# Patient Record
Sex: MALE | Race: OTHER | HISPANIC OR LATINO | URBAN - METROPOLITAN AREA
[De-identification: names, ages, dates, MRNs, and addresses within clinical notes are randomized per-mention and may not be internally consistent; named-entity substitution may affect disease eponyms.]

---

## 2019-03-31 ENCOUNTER — EMERGENCY (EMERGENCY)
Facility: HOSPITAL | Age: 79
LOS: 0 days | Discharge: ANOTHER HOSPITAL | End: 2019-04-01
Attending: EMERGENCY MEDICINE
Payer: MEDICARE

## 2019-03-31 VITALS
TEMPERATURE: 98 F | HEIGHT: 72 IN | DIASTOLIC BLOOD PRESSURE: 130 MMHG | SYSTOLIC BLOOD PRESSURE: 216 MMHG | OXYGEN SATURATION: 100 % | HEART RATE: 72 BPM | RESPIRATION RATE: 18 BRPM | WEIGHT: 214.95 LBS

## 2019-03-31 DIAGNOSIS — F32.9 MAJOR DEPRESSIVE DISORDER, SINGLE EPISODE, UNSPECIFIED: ICD-10-CM

## 2019-03-31 DIAGNOSIS — I10 ESSENTIAL (PRIMARY) HYPERTENSION: ICD-10-CM

## 2019-03-31 DIAGNOSIS — Z79.899 OTHER LONG TERM (CURRENT) DRUG THERAPY: ICD-10-CM

## 2019-03-31 DIAGNOSIS — R07.9 CHEST PAIN, UNSPECIFIED: ICD-10-CM

## 2019-03-31 DIAGNOSIS — I21.4 NON-ST ELEVATION (NSTEMI) MYOCARDIAL INFARCTION: ICD-10-CM

## 2019-03-31 DIAGNOSIS — Z79.82 LONG TERM (CURRENT) USE OF ASPIRIN: ICD-10-CM

## 2019-03-31 DIAGNOSIS — Z98.890 OTHER SPECIFIED POSTPROCEDURAL STATES: Chronic | ICD-10-CM

## 2019-03-31 DIAGNOSIS — E03.9 HYPOTHYROIDISM, UNSPECIFIED: ICD-10-CM

## 2019-03-31 DIAGNOSIS — I16.1 HYPERTENSIVE EMERGENCY: ICD-10-CM

## 2019-03-31 DIAGNOSIS — E11.9 TYPE 2 DIABETES MELLITUS WITHOUT COMPLICATIONS: ICD-10-CM

## 2019-03-31 DIAGNOSIS — I24.9 ACUTE ISCHEMIC HEART DISEASE, UNSPECIFIED: ICD-10-CM

## 2019-03-31 DIAGNOSIS — I25.10 ATHEROSCLEROTIC HEART DISEASE OF NATIVE CORONARY ARTERY WITHOUT ANGINA PECTORIS: ICD-10-CM

## 2019-03-31 LAB
ALBUMIN SERPL ELPH-MCNC: 2.7 G/DL — LOW (ref 3.3–5)
ALP SERPL-CCNC: 118 U/L — SIGNIFICANT CHANGE UP (ref 40–120)
ALT FLD-CCNC: 18 U/L — SIGNIFICANT CHANGE UP (ref 12–78)
ANION GAP SERPL CALC-SCNC: 7 MMOL/L — SIGNIFICANT CHANGE UP (ref 5–17)
APTT BLD: 32.6 SEC — SIGNIFICANT CHANGE UP (ref 27.5–36.3)
AST SERPL-CCNC: 26 U/L — SIGNIFICANT CHANGE UP (ref 15–37)
BILIRUB SERPL-MCNC: 0.3 MG/DL — SIGNIFICANT CHANGE UP (ref 0.2–1.2)
BUN SERPL-MCNC: 36 MG/DL — HIGH (ref 7–23)
CALCIUM SERPL-MCNC: 8.7 MG/DL — SIGNIFICANT CHANGE UP (ref 8.5–10.1)
CHLORIDE SERPL-SCNC: 107 MMOL/L — SIGNIFICANT CHANGE UP (ref 96–108)
CO2 SERPL-SCNC: 26 MMOL/L — SIGNIFICANT CHANGE UP (ref 22–31)
CREAT SERPL-MCNC: 3.13 MG/DL — HIGH (ref 0.5–1.3)
GLUCOSE SERPL-MCNC: 202 MG/DL — HIGH (ref 70–99)
HCT VFR BLD CALC: 39.5 % — SIGNIFICANT CHANGE UP (ref 39–50)
HGB BLD-MCNC: 12.9 G/DL — LOW (ref 13–17)
INR BLD: 0.94 RATIO — SIGNIFICANT CHANGE UP (ref 0.88–1.16)
MCHC RBC-ENTMCNC: 32.1 PG — SIGNIFICANT CHANGE UP (ref 27–34)
MCHC RBC-ENTMCNC: 32.7 GM/DL — SIGNIFICANT CHANGE UP (ref 32–36)
MCV RBC AUTO: 98.3 FL — SIGNIFICANT CHANGE UP (ref 80–100)
NRBC # BLD: 0 /100 WBCS — SIGNIFICANT CHANGE UP (ref 0–0)
NT-PROBNP SERPL-SCNC: 4405 PG/ML — HIGH (ref 0–450)
PLATELET # BLD AUTO: 174 K/UL — SIGNIFICANT CHANGE UP (ref 150–400)
POTASSIUM SERPL-MCNC: 4.5 MMOL/L — SIGNIFICANT CHANGE UP (ref 3.5–5.3)
POTASSIUM SERPL-SCNC: 4.5 MMOL/L — SIGNIFICANT CHANGE UP (ref 3.5–5.3)
PROT SERPL-MCNC: 6.6 GM/DL — SIGNIFICANT CHANGE UP (ref 6–8.3)
PROTHROM AB SERPL-ACNC: 10.5 SEC — SIGNIFICANT CHANGE UP (ref 10–12.9)
RBC # BLD: 4.02 M/UL — LOW (ref 4.2–5.8)
RBC # FLD: 13.5 % — SIGNIFICANT CHANGE UP (ref 10.3–14.5)
SODIUM SERPL-SCNC: 140 MMOL/L — SIGNIFICANT CHANGE UP (ref 135–145)
TROPONIN I SERPL-MCNC: 0.34 NG/ML — HIGH (ref 0.01–0.04)
WBC # BLD: 8.96 K/UL — SIGNIFICANT CHANGE UP (ref 3.8–10.5)
WBC # FLD AUTO: 8.96 K/UL — SIGNIFICANT CHANGE UP (ref 3.8–10.5)

## 2019-03-31 PROCEDURE — 71045 X-RAY EXAM CHEST 1 VIEW: CPT | Mod: 26

## 2019-03-31 PROCEDURE — 99291 CRITICAL CARE FIRST HOUR: CPT

## 2019-03-31 PROCEDURE — 93010 ELECTROCARDIOGRAM REPORT: CPT | Mod: 76

## 2019-03-31 RX ORDER — HEPARIN SODIUM 5000 [USP'U]/ML
INJECTION INTRAVENOUS; SUBCUTANEOUS
Qty: 25000 | Refills: 0 | Status: DISCONTINUED | OUTPATIENT
Start: 2019-03-31 | End: 2019-04-01

## 2019-03-31 RX ORDER — HEPARIN SODIUM 5000 [USP'U]/ML
6000 INJECTION INTRAVENOUS; SUBCUTANEOUS EVERY 6 HOURS
Qty: 0 | Refills: 0 | Status: DISCONTINUED | OUTPATIENT
Start: 2019-03-31 | End: 2019-04-01

## 2019-03-31 RX ORDER — FUROSEMIDE 40 MG
20 TABLET ORAL ONCE
Qty: 0 | Refills: 0 | Status: COMPLETED | OUTPATIENT
Start: 2019-03-31 | End: 2019-03-31

## 2019-03-31 RX ORDER — HEPARIN SODIUM 5000 [USP'U]/ML
5000 INJECTION INTRAVENOUS; SUBCUTANEOUS ONCE
Qty: 0 | Refills: 0 | Status: COMPLETED | OUTPATIENT
Start: 2019-03-31 | End: 2019-04-01

## 2019-03-31 RX ORDER — NITROGLYCERIN 6.5 MG
0.4 CAPSULE, EXTENDED RELEASE ORAL ONCE
Qty: 0 | Refills: 0 | Status: COMPLETED | OUTPATIENT
Start: 2019-03-31 | End: 2019-03-31

## 2019-03-31 RX ADMIN — Medication 0.4 MILLIGRAM(S): at 22:49

## 2019-03-31 NOTE — ED ADULT NURSE NOTE - OBJECTIVE STATEMENT
Patient reports left pectoral chest pain since 8 o'clock: Pain goes to back and left arm, started while watching TV, Same pain he had prior to bypass surgery. Patient reports left pectoral chest pain since 8 o'clock: Pain goes to back and left arm, started while watching TV, Same pain he had prior to bypass surgery. Patient reports pain decreases on rest. Took Aspirin prior to arrival.

## 2019-03-31 NOTE — ED PROVIDER NOTE - OBJECTIVE STATEMENT
78yoM; with pmh signif for CAD (s/p 2V-CABG), HTN, HLD; now p/w chest pain--left sided chest pain, radiating to left arm, associated with nausea, 10/10-->8/10, at rest, worse with movement.  denies sob/palp. denies lightheadedness/diaphoresis. denies f/c/s. c/o LE swelling.  PMH: CAD, HTN, HLD  SOCIAL: No tobacco/illicit substance use/socialEtOH 78yoM; with pmh signif for CAD (s/p 2V-CABG), HTN, HLD; now p/w chest pain--left sided chest pain, radiating to left arm, associated with nausea, 10/10-->8/10, at rest, worse with movement.  denies sob/palp. denies lightheadedness/diaphoresis. denies f/c/s. c/o LE swelling.  last cath >5 years ago.  CABG >10 years ago.  last stress >5 years ago  PMH: CAD, HTN, HLD  SOCIAL: No tobacco/illicit substance use/socialEtOH

## 2019-03-31 NOTE — ED PROVIDER NOTE - CARE PLAN
Principal Discharge DX:	ACS (acute coronary syndrome)  Secondary Diagnosis:	Hypertensive emergency  Secondary Diagnosis:	NSTEMI, initial episode of care

## 2019-03-31 NOTE — ED PROVIDER NOTE - NSRISKOFTRANSFER_ED_A_ED
Death or Disability/Transportation Risk (There is always a risk of traffic delays resulting in deterioration of condition.)/Deterioration of Condition En Route/Increased Pain

## 2019-03-31 NOTE — ED PROVIDER NOTE - CLINICAL SUMMARY MEDICAL DECISION MAKING FREE TEXT BOX
patient arrived c/o anginal equivalent chest pain, found to be hypertensive, st depressions in lateral leads, patient took [id number] at home at time of chest pain, given brilinta, heparin.  given IVP Hydralazine and given home meds. Discussed case with [facility] CCU fellow who accepted patient as transfer ER to ER.  patient and daughter notified.

## 2019-03-31 NOTE — ED ADULT NURSE NOTE - PMH
ASHD (arteriosclerotic heart disease)    Depression    Diabetes    HTN (hypertension)    Hypothyroid

## 2019-03-31 NOTE — ED PROVIDER NOTE - CRITICAL CARE PROVIDED
additional history taking/direct patient care (not related to procedure)/documentation/consultation with other physicians/consult w/ pt's family directly relating to pts condition/interpretation of diagnostic studies

## 2019-04-01 ENCOUNTER — INPATIENT (INPATIENT)
Facility: HOSPITAL | Age: 79
LOS: 6 days | Discharge: HOME | DRG: 246 | End: 2019-04-08
Attending: HOSPITALIST | Admitting: STUDENT IN AN ORGANIZED HEALTH CARE EDUCATION/TRAINING PROGRAM
Payer: MEDICARE

## 2019-04-01 VITALS
RESPIRATION RATE: 18 BRPM | HEART RATE: 78 BPM | WEIGHT: 214.07 LBS | OXYGEN SATURATION: 98 % | DIASTOLIC BLOOD PRESSURE: 165 MMHG | SYSTOLIC BLOOD PRESSURE: 214 MMHG | HEIGHT: 67 IN

## 2019-04-01 VITALS — SYSTOLIC BLOOD PRESSURE: 181 MMHG | DIASTOLIC BLOOD PRESSURE: 105 MMHG

## 2019-04-01 DIAGNOSIS — I21.4 NON-ST ELEVATION (NSTEMI) MYOCARDIAL INFARCTION: ICD-10-CM

## 2019-04-01 DIAGNOSIS — Z98.890 OTHER SPECIFIED POSTPROCEDURAL STATES: Chronic | ICD-10-CM

## 2019-04-01 DIAGNOSIS — Z95.1 PRESENCE OF AORTOCORONARY BYPASS GRAFT: Chronic | ICD-10-CM

## 2019-04-01 DIAGNOSIS — E11.9 TYPE 2 DIABETES MELLITUS WITHOUT COMPLICATIONS: ICD-10-CM

## 2019-04-01 DIAGNOSIS — I16.1 HYPERTENSIVE EMERGENCY: ICD-10-CM

## 2019-04-01 DIAGNOSIS — N17.9 ACUTE KIDNEY FAILURE, UNSPECIFIED: ICD-10-CM

## 2019-04-01 DIAGNOSIS — I10 ESSENTIAL (PRIMARY) HYPERTENSION: ICD-10-CM

## 2019-04-01 DIAGNOSIS — E03.9 HYPOTHYROIDISM, UNSPECIFIED: ICD-10-CM

## 2019-04-01 LAB
ALBUMIN SERPL ELPH-MCNC: 3.3 G/DL — SIGNIFICANT CHANGE UP (ref 3.3–5)
ALBUMIN SERPL ELPH-MCNC: 3.5 G/DL — SIGNIFICANT CHANGE UP (ref 3.3–5)
ALBUMIN SERPL ELPH-MCNC: 3.8 G/DL — SIGNIFICANT CHANGE UP (ref 3.3–5)
ALP SERPL-CCNC: 107 U/L — SIGNIFICANT CHANGE UP (ref 40–120)
ALP SERPL-CCNC: 110 U/L — SIGNIFICANT CHANGE UP (ref 40–120)
ALP SERPL-CCNC: 120 U/L — SIGNIFICANT CHANGE UP (ref 40–120)
ALT FLD-CCNC: 15 U/L — SIGNIFICANT CHANGE UP (ref 10–45)
ALT FLD-CCNC: 19 U/L — SIGNIFICANT CHANGE UP (ref 10–45)
ALT FLD-CCNC: 55 U/L — HIGH (ref 10–45)
ANION GAP SERPL CALC-SCNC: 12 MMOL/L — SIGNIFICANT CHANGE UP (ref 5–17)
ANION GAP SERPL CALC-SCNC: 13 MMOL/L — SIGNIFICANT CHANGE UP (ref 5–17)
ANION GAP SERPL CALC-SCNC: 15 MMOL/L — SIGNIFICANT CHANGE UP (ref 5–17)
ANION GAP SERPL CALC-SCNC: 18 MMOL/L — HIGH (ref 5–17)
APTT BLD: 51.2 SEC — HIGH (ref 27.5–36.3)
APTT BLD: 98.2 SEC — HIGH (ref 27.5–36.3)
AST SERPL-CCNC: 44 U/L — HIGH (ref 10–40)
AST SERPL-CCNC: 47 U/L — HIGH (ref 10–40)
AST SERPL-CCNC: 96 U/L — HIGH (ref 10–40)
BILIRUB SERPL-MCNC: 0.3 MG/DL — SIGNIFICANT CHANGE UP (ref 0.2–1.2)
BILIRUB SERPL-MCNC: 0.4 MG/DL — SIGNIFICANT CHANGE UP (ref 0.2–1.2)
BILIRUB SERPL-MCNC: 0.4 MG/DL — SIGNIFICANT CHANGE UP (ref 0.2–1.2)
BLD GP AB SCN SERPL QL: NEGATIVE — SIGNIFICANT CHANGE UP
BUN SERPL-MCNC: 31 MG/DL — HIGH (ref 7–23)
BUN SERPL-MCNC: 31 MG/DL — HIGH (ref 7–23)
BUN SERPL-MCNC: 32 MG/DL — HIGH (ref 7–23)
BUN SERPL-MCNC: 32 MG/DL — HIGH (ref 7–23)
CALCIUM SERPL-MCNC: 9.3 MG/DL — SIGNIFICANT CHANGE UP (ref 8.4–10.5)
CALCIUM SERPL-MCNC: 9.5 MG/DL — SIGNIFICANT CHANGE UP (ref 8.4–10.5)
CALCIUM SERPL-MCNC: 9.5 MG/DL — SIGNIFICANT CHANGE UP (ref 8.4–10.5)
CALCIUM SERPL-MCNC: 9.8 MG/DL — SIGNIFICANT CHANGE UP (ref 8.4–10.5)
CHLORIDE SERPL-SCNC: 103 MMOL/L — SIGNIFICANT CHANGE UP (ref 96–108)
CHLORIDE SERPL-SCNC: 105 MMOL/L — SIGNIFICANT CHANGE UP (ref 96–108)
CHLORIDE SERPL-SCNC: 105 MMOL/L — SIGNIFICANT CHANGE UP (ref 96–108)
CHLORIDE SERPL-SCNC: 106 MMOL/L — SIGNIFICANT CHANGE UP (ref 96–108)
CHOLEST SERPL-MCNC: 217 MG/DL — HIGH (ref 10–199)
CK MB BLD-MCNC: 6 % — HIGH (ref 0–3.5)
CK MB CFR SERPL CALC: 19.3 NG/ML — HIGH (ref 0–6.7)
CK MB CFR SERPL CALC: 22.8 NG/ML — HIGH (ref 0–6.7)
CK SERPL-CCNC: 313 U/L — HIGH (ref 30–200)
CK SERPL-CCNC: 383 U/L — HIGH (ref 30–200)
CO2 SERPL-SCNC: 19 MMOL/L — LOW (ref 22–31)
CO2 SERPL-SCNC: 21 MMOL/L — LOW (ref 22–31)
CO2 SERPL-SCNC: 22 MMOL/L — SIGNIFICANT CHANGE UP (ref 22–31)
CO2 SERPL-SCNC: 24 MMOL/L — SIGNIFICANT CHANGE UP (ref 22–31)
CREAT SERPL-MCNC: 2.64 MG/DL — HIGH (ref 0.5–1.3)
CREAT SERPL-MCNC: 2.7 MG/DL — HIGH (ref 0.5–1.3)
CREAT SERPL-MCNC: 2.86 MG/DL — HIGH (ref 0.5–1.3)
CREAT SERPL-MCNC: 2.9 MG/DL — HIGH (ref 0.5–1.3)
D DIMER BLD IA.RAPID-MCNC: 609 NG/ML DDU — HIGH
FIBRINOGEN PPP-MCNC: 627 MG/DL — HIGH (ref 350–510)
GLUCOSE BLDC GLUCOMTR-MCNC: 107 MG/DL — HIGH (ref 70–99)
GLUCOSE SERPL-MCNC: 102 MG/DL — HIGH (ref 70–99)
GLUCOSE SERPL-MCNC: 90 MG/DL — SIGNIFICANT CHANGE UP (ref 70–99)
GLUCOSE SERPL-MCNC: 96 MG/DL — SIGNIFICANT CHANGE UP (ref 70–99)
GLUCOSE SERPL-MCNC: 99 MG/DL — SIGNIFICANT CHANGE UP (ref 70–99)
HBA1C BLD-MCNC: 6.3 % — HIGH (ref 4–5.6)
HCT VFR BLD CALC: 42.4 % — SIGNIFICANT CHANGE UP (ref 39–50)
HCT VFR BLD CALC: 43.7 % — SIGNIFICANT CHANGE UP (ref 39–50)
HDLC SERPL-MCNC: 46 MG/DL — SIGNIFICANT CHANGE UP
HGB BLD-MCNC: 14 G/DL — SIGNIFICANT CHANGE UP (ref 13–17)
HGB BLD-MCNC: 14.7 G/DL — SIGNIFICANT CHANGE UP (ref 13–17)
INR BLD: 0.95 RATIO — SIGNIFICANT CHANGE UP (ref 0.88–1.16)
LIDOCAIN IGE QN: 40 U/L — SIGNIFICANT CHANGE UP (ref 7–60)
LIPID PNL WITH DIRECT LDL SERPL: 145 MG/DL — HIGH
MAGNESIUM SERPL-MCNC: 2.4 MG/DL — SIGNIFICANT CHANGE UP (ref 1.6–2.6)
MAGNESIUM SERPL-MCNC: 2.5 MG/DL — SIGNIFICANT CHANGE UP (ref 1.6–2.6)
MCHC RBC-ENTMCNC: 32.5 PG — SIGNIFICANT CHANGE UP (ref 27–34)
MCHC RBC-ENTMCNC: 32.9 PG — SIGNIFICANT CHANGE UP (ref 27–34)
MCHC RBC-ENTMCNC: 33 GM/DL — SIGNIFICANT CHANGE UP (ref 32–36)
MCHC RBC-ENTMCNC: 33.6 GM/DL — SIGNIFICANT CHANGE UP (ref 32–36)
MCV RBC AUTO: 98 FL — SIGNIFICANT CHANGE UP (ref 80–100)
MCV RBC AUTO: 98.4 FL — SIGNIFICANT CHANGE UP (ref 80–100)
NT-PROBNP SERPL-SCNC: 4165 PG/ML — HIGH (ref 0–300)
PHOSPHATE SERPL-MCNC: 2.6 MG/DL — SIGNIFICANT CHANGE UP (ref 2.5–4.5)
PLATELET # BLD AUTO: 181 K/UL — SIGNIFICANT CHANGE UP (ref 150–400)
PLATELET # BLD AUTO: 184 K/UL — SIGNIFICANT CHANGE UP (ref 150–400)
POTASSIUM SERPL-MCNC: 3.8 MMOL/L — SIGNIFICANT CHANGE UP (ref 3.5–5.3)
POTASSIUM SERPL-MCNC: 4.1 MMOL/L — SIGNIFICANT CHANGE UP (ref 3.5–5.3)
POTASSIUM SERPL-MCNC: 6.1 MMOL/L — HIGH (ref 3.5–5.3)
POTASSIUM SERPL-MCNC: 7.3 MMOL/L — CRITICAL HIGH (ref 3.5–5.3)
POTASSIUM SERPL-SCNC: 3.8 MMOL/L — SIGNIFICANT CHANGE UP (ref 3.5–5.3)
POTASSIUM SERPL-SCNC: 4.1 MMOL/L — SIGNIFICANT CHANGE UP (ref 3.5–5.3)
POTASSIUM SERPL-SCNC: 6.1 MMOL/L — HIGH (ref 3.5–5.3)
POTASSIUM SERPL-SCNC: 7.3 MMOL/L — CRITICAL HIGH (ref 3.5–5.3)
PROT SERPL-MCNC: 6.7 G/DL — SIGNIFICANT CHANGE UP (ref 6–8.3)
PROT SERPL-MCNC: 7.1 G/DL — SIGNIFICANT CHANGE UP (ref 6–8.3)
PROT SERPL-MCNC: 7.6 G/DL — SIGNIFICANT CHANGE UP (ref 6–8.3)
PROTHROM AB SERPL-ACNC: 10.8 SEC — SIGNIFICANT CHANGE UP (ref 10–12.9)
RBC # BLD: 4.31 M/UL — SIGNIFICANT CHANGE UP (ref 4.2–5.8)
RBC # BLD: 4.46 M/UL — SIGNIFICANT CHANGE UP (ref 4.2–5.8)
RBC # FLD: 12.2 % — SIGNIFICANT CHANGE UP (ref 10.3–14.5)
RBC # FLD: 12.3 % — SIGNIFICANT CHANGE UP (ref 10.3–14.5)
RH IG SCN BLD-IMP: POSITIVE — SIGNIFICANT CHANGE UP
SODIUM SERPL-SCNC: 137 MMOL/L — SIGNIFICANT CHANGE UP (ref 135–145)
SODIUM SERPL-SCNC: 141 MMOL/L — SIGNIFICANT CHANGE UP (ref 135–145)
SODIUM SERPL-SCNC: 142 MMOL/L — SIGNIFICANT CHANGE UP (ref 135–145)
SODIUM SERPL-SCNC: 143 MMOL/L — SIGNIFICANT CHANGE UP (ref 135–145)
TOTAL CHOLESTEROL/HDL RATIO MEASUREMENT: 4.7 RATIO — SIGNIFICANT CHANGE UP (ref 3.4–9.6)
TRIGL SERPL-MCNC: 129 MG/DL — SIGNIFICANT CHANGE UP (ref 10–149)
TROPONIN T, HIGH SENSITIVITY RESULT: 416 NG/L — HIGH (ref 0–51)
TROPONIN T, HIGH SENSITIVITY RESULT: 819 NG/L — HIGH (ref 0–51)
WBC # BLD: 10 K/UL — SIGNIFICANT CHANGE UP (ref 3.8–10.5)
WBC # BLD: 9 K/UL — SIGNIFICANT CHANGE UP (ref 3.8–10.5)
WBC # FLD AUTO: 10 K/UL — SIGNIFICANT CHANGE UP (ref 3.8–10.5)
WBC # FLD AUTO: 9 K/UL — SIGNIFICANT CHANGE UP (ref 3.8–10.5)

## 2019-04-01 PROCEDURE — 92941 PRQ TRLML REVSC TOT OCCL AMI: CPT | Mod: LD,GC

## 2019-04-01 PROCEDURE — 93458 L HRT ARTERY/VENTRICLE ANGIO: CPT | Mod: 26,59,GC

## 2019-04-01 PROCEDURE — 99223 1ST HOSP IP/OBS HIGH 75: CPT | Mod: GC

## 2019-04-01 PROCEDURE — 92978 ENDOLUMINL IVUS OCT C 1ST: CPT | Mod: 26,LD,GC

## 2019-04-01 PROCEDURE — 99291 CRITICAL CARE FIRST HOUR: CPT | Mod: GC

## 2019-04-01 PROCEDURE — 71046 X-RAY EXAM CHEST 2 VIEWS: CPT | Mod: 26

## 2019-04-01 PROCEDURE — 93308 TTE F-UP OR LMTD: CPT | Mod: 26

## 2019-04-01 PROCEDURE — 93010 ELECTROCARDIOGRAM REPORT: CPT

## 2019-04-01 PROCEDURE — 99152 MOD SED SAME PHYS/QHP 5/>YRS: CPT | Mod: GC

## 2019-04-01 RX ORDER — NITROGLYCERIN 6.5 MG
25 CAPSULE, EXTENDED RELEASE ORAL
Qty: 50 | Refills: 0 | Status: DISCONTINUED | OUTPATIENT
Start: 2019-04-01 | End: 2019-04-01

## 2019-04-01 RX ORDER — SODIUM CHLORIDE 9 MG/ML
1000 INJECTION, SOLUTION INTRAVENOUS
Qty: 0 | Refills: 0 | Status: DISCONTINUED | OUTPATIENT
Start: 2019-04-01 | End: 2019-04-08

## 2019-04-01 RX ORDER — HYDRALAZINE HCL 50 MG
25 TABLET ORAL ONCE
Qty: 0 | Refills: 0 | Status: DISCONTINUED | OUTPATIENT
Start: 2019-04-01 | End: 2019-04-01

## 2019-04-01 RX ORDER — HYDRALAZINE HCL 50 MG
10 TABLET ORAL ONCE
Qty: 0 | Refills: 0 | Status: COMPLETED | OUTPATIENT
Start: 2019-04-01 | End: 2019-04-01

## 2019-04-01 RX ORDER — METOPROLOL TARTRATE 50 MG
25 TABLET ORAL
Qty: 0 | Refills: 0 | Status: DISCONTINUED | OUTPATIENT
Start: 2019-04-01 | End: 2019-04-04

## 2019-04-01 RX ORDER — ISOSORBIDE MONONITRATE 60 MG/1
60 TABLET, EXTENDED RELEASE ORAL DAILY
Qty: 0 | Refills: 0 | Status: DISCONTINUED | OUTPATIENT
Start: 2019-04-01 | End: 2019-04-01

## 2019-04-01 RX ORDER — INSULIN LISPRO 100/ML
VIAL (ML) SUBCUTANEOUS
Qty: 0 | Refills: 0 | Status: DISCONTINUED | OUTPATIENT
Start: 2019-04-01 | End: 2019-04-08

## 2019-04-01 RX ORDER — INSULIN LISPRO 100/ML
VIAL (ML) SUBCUTANEOUS AT BEDTIME
Qty: 0 | Refills: 0 | Status: DISCONTINUED | OUTPATIENT
Start: 2019-04-01 | End: 2019-04-08

## 2019-04-01 RX ORDER — METOPROLOL TARTRATE 50 MG
50 TABLET ORAL ONCE
Qty: 0 | Refills: 0 | Status: COMPLETED | OUTPATIENT
Start: 2019-04-01 | End: 2019-04-01

## 2019-04-01 RX ORDER — HEPARIN SODIUM 5000 [USP'U]/ML
700 INJECTION INTRAVENOUS; SUBCUTANEOUS
Qty: 25000 | Refills: 0 | Status: DISCONTINUED | OUTPATIENT
Start: 2019-04-01 | End: 2019-04-01

## 2019-04-01 RX ORDER — DEXTROSE 50 % IN WATER 50 %
25 SYRINGE (ML) INTRAVENOUS ONCE
Qty: 0 | Refills: 0 | Status: DISCONTINUED | OUTPATIENT
Start: 2019-04-01 | End: 2019-04-08

## 2019-04-01 RX ORDER — LEVOTHYROXINE SODIUM 125 MCG
175 TABLET ORAL DAILY
Qty: 0 | Refills: 0 | Status: DISCONTINUED | OUTPATIENT
Start: 2019-04-01 | End: 2019-04-04

## 2019-04-01 RX ORDER — LISINOPRIL 2.5 MG/1
20 TABLET ORAL ONCE
Qty: 0 | Refills: 0 | Status: COMPLETED | OUTPATIENT
Start: 2019-04-01 | End: 2019-04-01

## 2019-04-01 RX ORDER — TICAGRELOR 90 MG/1
180 TABLET ORAL ONCE
Qty: 0 | Refills: 0 | Status: COMPLETED | OUTPATIENT
Start: 2019-04-01 | End: 2019-04-01

## 2019-04-01 RX ORDER — HYDRALAZINE HCL 50 MG
50 TABLET ORAL EVERY 8 HOURS
Qty: 0 | Refills: 0 | Status: DISCONTINUED | OUTPATIENT
Start: 2019-04-01 | End: 2019-04-02

## 2019-04-01 RX ORDER — ISOSORBIDE MONONITRATE 60 MG/1
60 TABLET, EXTENDED RELEASE ORAL DAILY
Qty: 0 | Refills: 0 | Status: DISCONTINUED | OUTPATIENT
Start: 2019-04-01 | End: 2019-04-03

## 2019-04-01 RX ORDER — NITROGLYCERIN 6.5 MG
1 CAPSULE, EXTENDED RELEASE ORAL ONCE
Qty: 0 | Refills: 0 | Status: COMPLETED | OUTPATIENT
Start: 2019-04-01 | End: 2019-04-01

## 2019-04-01 RX ORDER — ASPIRIN/CALCIUM CARB/MAGNESIUM 324 MG
81 TABLET ORAL DAILY
Qty: 0 | Refills: 0 | Status: DISCONTINUED | OUTPATIENT
Start: 2019-04-02 | End: 2019-04-08

## 2019-04-01 RX ORDER — PANTOPRAZOLE SODIUM 20 MG/1
40 TABLET, DELAYED RELEASE ORAL
Qty: 0 | Refills: 0 | Status: DISCONTINUED | OUTPATIENT
Start: 2019-04-01 | End: 2019-04-08

## 2019-04-01 RX ORDER — DEXTROSE 50 % IN WATER 50 %
15 SYRINGE (ML) INTRAVENOUS ONCE
Qty: 0 | Refills: 0 | Status: DISCONTINUED | OUTPATIENT
Start: 2019-04-01 | End: 2019-04-08

## 2019-04-01 RX ORDER — HYDRALAZINE HCL 50 MG
25 TABLET ORAL ONCE
Qty: 0 | Refills: 0 | Status: COMPLETED | OUTPATIENT
Start: 2019-04-01 | End: 2019-04-01

## 2019-04-01 RX ORDER — GLUCAGON INJECTION, SOLUTION 0.5 MG/.1ML
1 INJECTION, SOLUTION SUBCUTANEOUS ONCE
Qty: 0 | Refills: 0 | Status: DISCONTINUED | OUTPATIENT
Start: 2019-04-01 | End: 2019-04-08

## 2019-04-01 RX ORDER — ATORVASTATIN CALCIUM 80 MG/1
40 TABLET, FILM COATED ORAL ONCE
Qty: 0 | Refills: 0 | Status: COMPLETED | OUTPATIENT
Start: 2019-04-01 | End: 2019-04-01

## 2019-04-01 RX ORDER — DEXTROSE 50 % IN WATER 50 %
12.5 SYRINGE (ML) INTRAVENOUS ONCE
Qty: 0 | Refills: 0 | Status: DISCONTINUED | OUTPATIENT
Start: 2019-04-01 | End: 2019-04-08

## 2019-04-01 RX ORDER — HYDRALAZINE HCL 50 MG
50 TABLET ORAL ONCE
Qty: 0 | Refills: 0 | Status: COMPLETED | OUTPATIENT
Start: 2019-04-01 | End: 2019-04-01

## 2019-04-01 RX ORDER — HEPARIN SODIUM 5000 [USP'U]/ML
INJECTION INTRAVENOUS; SUBCUTANEOUS
Qty: 25000 | Refills: 0 | Status: DISCONTINUED | OUTPATIENT
Start: 2019-04-01 | End: 2019-04-01

## 2019-04-01 RX ORDER — TICAGRELOR 90 MG/1
90 TABLET ORAL
Qty: 0 | Refills: 0 | Status: DISCONTINUED | OUTPATIENT
Start: 2019-04-02 | End: 2019-04-08

## 2019-04-01 RX ORDER — ATORVASTATIN CALCIUM 80 MG/1
80 TABLET, FILM COATED ORAL AT BEDTIME
Qty: 0 | Refills: 0 | Status: DISCONTINUED | OUTPATIENT
Start: 2019-04-01 | End: 2019-04-08

## 2019-04-01 RX ADMIN — Medication 50 MILLIGRAM(S): at 15:17

## 2019-04-01 RX ADMIN — Medication 20 MILLIGRAM(S): at 00:07

## 2019-04-01 RX ADMIN — HEPARIN SODIUM 1000 UNIT(S)/HR: 5000 INJECTION INTRAVENOUS; SUBCUTANEOUS at 00:09

## 2019-04-01 RX ADMIN — TICAGRELOR 180 MILLIGRAM(S): 90 TABLET ORAL at 02:51

## 2019-04-01 RX ADMIN — HEPARIN SODIUM 900 UNIT(S)/HR: 5000 INJECTION INTRAVENOUS; SUBCUTANEOUS at 16:25

## 2019-04-01 RX ADMIN — HEPARIN SODIUM 700 UNIT(S)/HR: 5000 INJECTION INTRAVENOUS; SUBCUTANEOUS at 09:15

## 2019-04-01 RX ADMIN — Medication 10 MILLIGRAM(S): at 00:53

## 2019-04-01 RX ADMIN — Medication 50 MILLIGRAM(S): at 03:17

## 2019-04-01 RX ADMIN — HEPARIN SODIUM 1000 UNIT(S)/HR: 5000 INJECTION INTRAVENOUS; SUBCUTANEOUS at 04:14

## 2019-04-01 RX ADMIN — Medication 50 MILLIGRAM(S): at 22:58

## 2019-04-01 RX ADMIN — ISOSORBIDE MONONITRATE 60 MILLIGRAM(S): 60 TABLET, EXTENDED RELEASE ORAL at 07:30

## 2019-04-01 RX ADMIN — ATORVASTATIN CALCIUM 40 MILLIGRAM(S): 80 TABLET, FILM COATED ORAL at 02:49

## 2019-04-01 RX ADMIN — ATORVASTATIN CALCIUM 80 MILLIGRAM(S): 80 TABLET, FILM COATED ORAL at 22:58

## 2019-04-01 RX ADMIN — Medication 25 MILLIGRAM(S): at 07:31

## 2019-04-01 RX ADMIN — Medication 1 INCH(S): at 03:17

## 2019-04-01 RX ADMIN — LISINOPRIL 20 MILLIGRAM(S): 2.5 TABLET ORAL at 02:49

## 2019-04-01 RX ADMIN — HEPARIN SODIUM 5000 UNIT(S): 5000 INJECTION INTRAVENOUS; SUBCUTANEOUS at 00:08

## 2019-04-01 RX ADMIN — Medication 7.5 MICROGRAM(S)/MIN: at 09:16

## 2019-04-01 NOTE — ED ADULT NURSE REASSESSMENT NOTE - NS ED NURSE REASSESS COMMENT FT1
MD hernandez made aware of BP, pt treated with multiple medications prior to transfer, MD does not want to treat BP at this time.

## 2019-04-01 NOTE — H&P ADULT - HISTORY OF PRESENT ILLNESS
This is a 78 year od male with history of HTN, DMT2, CAD with stents x2 (2000), hypothyroid, depression, transferred from [facility] this morning for NSTEMI and hypertensive urgency. the patient was brought in by daughter with complaints of substernal CP with radiation to left arm that started around 2045 (3/31), was found to have 's/120's, trop This is a 78 year od male with history of HTN, DMT2, CAD with stents x2 (2000), hypothyroid, depression, transferred from [facility] this morning for NSTEMI and hypertensive urgency. Pt noted gradually worsening substernal chest pain with radiation to RUE and took   mg x 2 pills prior to coming in to the hospital.  Pt presented to [place] where he was found to be hypertensive 200s/120s with ST depressions in V4-6 and trop I 0.344. Pt was given 10mg iv hydral, 20mg lisinopril, 50mg po lopressor, and 20mg iv lasix prior to transfer to [facility] (4/1). Transferred to CCU for persistent chest discomfort and 's now on nitro gtt and hep gtt but otherwise denied headache, vision changes, lightheadedness/dizziness, palpitations, and SOB, pending cardiac cath.

## 2019-04-01 NOTE — ED PROVIDER NOTE - ATTENDING CONTRIBUTION TO CARE
Attending MD Valarie Calix:  I personally have seen and examined this patient.  Resident note reviewed and agree on plan of care and except where noted.  See HPI, PE, and MDM for details.

## 2019-04-01 NOTE — H&P ADULT - NSHPREVIEWOFSYSTEMS_GEN_ALL_CORE
REVIEW OF SYSTEMS:    CONSTITUTIONAL: No weakness, fevers or chills  EYES/ENT: No visual changes;  No vertigo or throat pain   NECK: No pain or stiffness  RESPIRATORY: No cough, wheezing, hemoptysis; No shortness of breath  CARDIOVASCULAR: c/o intermittent substernal chest tightness, palpitations  GASTROINTESTINAL: No abdominal or epigastric pain. No nausea, vomiting, or hematemesis; No diarrhea or constipation. No melena or hematochezia.  GENITOURINARY: No dysuria, frequency or hematuria  NEUROLOGICAL: No numbness or weakness  SKIN: No itching, burning, rashes, or lesions   All other review of systems is negative unless indicated above.

## 2019-04-01 NOTE — H&P ADULT - NSHPPHYSICALEXAM_GEN_ALL_CORE
PHYSICAL EXAM:      Constitutional:    Eyes:    ENMT:    Neck:    Breasts:    Back:    Respiratory:    Cardiovascular:    Gastrointestinal:    Genitourinary:    Rectal:    Extremities:    Vascular:    Neurological:    Skin:    Lymph Nodes:    Musculoskeletal:    Psychiatric: PHYSICAL EXAM:      Constitutional: NAD    Respiratory: B/l lungs CTA     Cardiovascular: c/o intermittent substernal chest pain 4/10, denies any radiation     Gastrointestinal: nondistended, non tender, +BS    Genitourinary: no CVA tenderness     Extremities: no edema     Neurological: AOx3     Skin: intact, no rashes    Musculoskeletal: full ROM PHYSICAL EXAM:      Constitutional: WD /WN NAD    Respiratory: B/l lungs CTA     Cardiovascular: RRR, S1,S2     Gastrointestinal: nondistended, non tender, +BS    Genitourinary: no CVA tenderness     Extremities: no edema     Neurological: AOx3     Skin: intact, no rashes    Musculoskeletal: full ROM

## 2019-04-01 NOTE — ED PROVIDER NOTE - CARE PLAN
Principal Discharge DX:	Hypertensive emergency  Secondary Diagnosis:	CAD (coronary artery disease)  Secondary Diagnosis:	NSTEMI (non-ST elevated myocardial infarction)  Secondary Diagnosis:	ALDEN (acute kidney injury)  Secondary Diagnosis:	S/P CABG (coronary artery bypass graft)  Secondary Diagnosis:	HTN (hypertension)  Secondary Diagnosis:	HLD (hyperlipidemia)

## 2019-04-01 NOTE — CHART NOTE - NSCHARTNOTEFT_GEN_A_CORE
====================  CCU MIDNIGHT ROUNDS  ====================    YE OSWALDGO  [id number]    ====================  SUMMARY: HPI:  This is a 78 year od male with history of HTN, DMT2, CAD with stents x2 (2000), hypothyroid, depression, transferred from [facility] this morning for NSTEMI and hypertensive urgency. Pt noted gradually worsening substernal chest pain with radiation to RUE and took   mg x 2 pills prior to coming in to the hospital.  Pt presented to [place] where he was found to be hypertensive 200s/120s with ST depressions in V4-6 and trop I 0.344. Pt was given 10mg iv hydral, 20mg lisinopril, 50mg po lopressor, and 20mg iv lasix prior to transfer to [facility] (4/1). Transferred to CCU for persistent chest discomfort and 's now on nitro gtt and hep gtt but otherwise denied headache, vision changes, lightheadedness/dizziness, palpitations, and SOB, pending cardiac cath. (01 Apr 2019 15:03)    ====================        ====================  NEW EVENTS:  s/p DESx2 to pLAD via RFA  Sheath to be removed tonight  Wean NTG gtt off if able  ====================        ====================  VITALS (Last 12 hrs):  ====================    T(C): 36.7 (04-01-19 @ 21:56), Max: 36.9 (04-01-19 @ 12:45)  HR: 68 (04-01-19 @ 22:30) (60 - 82)  BP: 177/94 (04-01-19 @ 22:30) (106/70 - 189/101)  BP(mean): 118 (04-01-19 @ 22:30) (86 - 133)  RR: 13 (04-01-19 @ 22:30) (11 - 23)  SpO2: 100% (04-01-19 @ 22:30) (95% - 100%)        I&O's Summary    01 Apr 2019 07:01  -  01 Apr 2019 23:14  --------------------------------------------------------  IN: 165.1 mL / OUT: 450 mL / NET: -284.9 mL        ====================  PLAN:  # NSTEMI/ HTN crisis  - s/p nitro gtt, hydral/indur started  - will start lopressor, hold ACE/ARB for now s/p cath  - s/p MACI x2 to LAD  - c/w DAPT, statin  - RFA sheath to be removed tonight  - trend CE, ekgs PRN  - f/u TTE  ====================    HEALTH ISSUES - PROBLEM Dx:  Hypothyroid: Hypothyroid  DM (diabetes mellitus): DM (diabetes mellitus)  ALDEN (acute kidney injury): ALDEN (acute kidney injury)  HTN (hypertension): HTN (hypertension)  NSTEMI (non-ST elevated myocardial infarction): NSTEMI (non-ST elevated myocardial infarction)        Augustina Dash, CCU PA #[id number]

## 2019-04-01 NOTE — ED ADULT NURSE REASSESSMENT NOTE - NS ED NURSE REASSESS COMMENT FT1
Patient resting comfortably in stretcher. VSS. BP has improved to 142/86. Patient denies CP, SOB, fever/chills, N/V at this time. Patient's family at bedside. plan of care discussed. safety and comfort measures maintained.

## 2019-04-01 NOTE — ED ADULT NURSE REASSESSMENT NOTE - NS ED NURSE REASSESS COMMENT FT1
heparin stopped for 1 hour as per ACS heparin nomogram for a ptt of 98.2.  MD Calix made aware.  Heparin stopped at 0644.

## 2019-04-01 NOTE — H&P ADULT - PROBLEM SELECTOR PLAN 1
- consistent c/o substernal chest pain   - plan for cardiac cath today   - trend CE - consistent c/o substernal chest pain   - loaded at [facility] asa/brillinta   - start lipitor  - plan for cardiac cath today   - trend CE

## 2019-04-01 NOTE — H&P ADULT - NSICDXPASTMEDICALHX_GEN_ALL_CORE_FT
PAST MEDICAL HISTORY:  CAD (coronary artery disease)     Depression     DM (diabetes mellitus)     Glaucoma     HLD (hyperlipidemia)     HTN (hypertension) PAST MEDICAL HISTORY:  CAD (coronary artery disease)     Depression     DM (diabetes mellitus)     Glaucoma     History of kidney stones     HLD (hyperlipidemia)     HTN (hypertension)     Hypothyroid

## 2019-04-01 NOTE — ED ADULT NURSE REASSESSMENT NOTE - NS ED NURSE REASSESS COMMENT FT1
Spoke with Ryley (pager [phone]) cardiology fellow at [phone], He is aware that pt is complaining of increased CP, He states that he is going to speak with the CCU service to find out who is covering this pt.    Nitro infusion increased to 30 mcg/min

## 2019-04-01 NOTE — ED PROVIDER NOTE - SECONDARY DIAGNOSIS.
CAD (coronary artery disease) NSTEMI (non-ST elevated myocardial infarction) ALDEN (acute kidney injury) HTN (hypertension) HLD (hyperlipidemia) DM (diabetes mellitus) S/P CABG (coronary artery bypass graft)

## 2019-04-01 NOTE — H&P ADULT - PROBLEM SELECTOR PLAN 3
- -creat 1.4 (2014)  -creat today 2.90  -avoid neprhotoxic meds,  -monitor I/O's, BMP  -replete electrolytes PRN

## 2019-04-01 NOTE — ED PROVIDER NOTE - OBJECTIVE STATEMENT
77yo M pmhx HTN DM CAD s/p CABG transferred from [place] for NSTEMI. Pt. initially presented to [place] for chest pain radiating to L arm starting at approx 8pm, subsided after getting to the hospital. Pt. had normal EKG with elevated troponin levels, was transferred to [facility] for cards eval. Pt. states that he is pain free at this time. He is currently on a heparin drip and s/p loading dose of Brilinta. Denies fever chills SOB n/v/d back pain abd pain.

## 2019-04-01 NOTE — H&P ADULT - NSHPLABSRESULTS_GEN_ALL_CORE
14.0   10.0  )-----------( 181      ( 01 Apr 2019 15:20 )             42.4   04-01    143  |  106  |  32<H>  ----------------------------<  99  3.8   |  22  |  2.90<H>    Ca    9.8      01 Apr 2019 08:17  Mg     2.5     04-01    TPro  7.1  /  Alb  3.8  /  TBili  0.4  /  DBili  x   /  AST  44<H>  /  ALT  15  /  AlkPhos  120  04-01    CARDIAC MARKERS ( 01 Apr 2019 15:20 )  x     / x     / x     / x     / 19.3 ng/mL  CARDIAC MARKERS ( 01 Apr 2019 04:52 )  x     / x     / 383 U/L / x     / 22.8 ng/mL

## 2019-04-01 NOTE — ED ADULT NURSE REASSESSMENT NOTE - NS ED NURSE REASSESS COMMENT FT1
Patient resting comfortably. VSS. Nitroglycerin titrated as per EMAR. Patient denies chest pain, SOB at this time. Cardiology at bedside. plan of care discussed. safety and comfort measures maintained.

## 2019-04-01 NOTE — ED ADULT NURSE NOTE - OBJECTIVE STATEMENT
78 y/ M presents to the ED via EMS c/o chest pain.  Pt has hx DM, CAD, HTN, CABG, HLD.  Pt transfer from [place] for cards eval.  Pt developed L sided chest pain at 8 PM (similar to the pain before previous CABG).  EKG at [facility] showed "mild ST depression" as per EMS.  Prior going to ED pt took 325mg ASA.  Pt received 180mg Brilinta PO at [place] and Heparin was started at 0009 using ACS nomogram, 1,000units units (10ml/hr) infusing as per protocol.  Pt was also treated for BP at [place] and was given PO antihypertensive right before transfer as per EMS.  Pt also has nitro paste on chest from [facility].  Currently in ED pt denies all pain and state she feels better.  Pt Lao speaking.  Patient is A&Ox4. Face is symmetrical. PERRL 3mmB. Speech is clear. Patient is moving all extremities with 5/5 strength.  EKG complete and given to MD Calix, pt on cardiac monitor.

## 2019-04-01 NOTE — ED ADULT NURSE REASSESSMENT NOTE - NS ED NURSE REASSESS COMMENT FT1
Patient B/P remains elevated, Dr. Christianson aware. Patient asymptomatic. Report to EMS team
Break Coverage- Pt and family educated on effects/side effects/untoward effects of heparin therapy. Educated to look out for bleeding gums, blood in urine and to avoid shaving/clipping nails, etc with teachback verification. Meds given as per order. will continue to monitor awaiting admission orders. vitals as noted.. rsr on monitor, no ectopy.

## 2019-04-01 NOTE — CONSULT NOTE ADULT - SUBJECTIVE AND OBJECTIVE BOX
Patient seen and evaluated @   Reason for consult:     HPI: 78 year old M pt with PMH of HTN, HLD, CAD s/p CABG, and DM2 presented as a transfer from [place] for NSTEMI. Pt noted substernal chest pain while at rest at home. He noted gradually worsening chest pain with radiation to RUE. Pt presented to [place] where he was found to be hypertensive 200s/120s with ST depressions in V4-6 and trop I 0.344. Pt was given 10mg iv hydral, 20mg lisinopril, 50mg po lopressor, and 20mg iv lasix prior to transfer.     When seen in the ED, pt was resting comfortably and answering questions appropriately through a . Pt noted persistent chest discomfort but otherwise denied headache, vision changes, lightheadedness/dizziness, palpitations, and SOB. ROS was otherwise unremarkable.    PMH:   DM (diabetes mellitus)  CAD (coronary artery disease)  HLD (hyperlipidemia)  HTN (hypertension)  No pertinent past medical history    PSH:   S/P CABG (coronary artery bypass graft)    Medications:   heparin  Infusion.  Unit(s)/Hr IV Continuous <Continuous>  hydrALAZINE 25 milliGRAM(s) Oral Once  isosorbide   mononitrate ER Tablet (IMDUR) 60 milliGRAM(s) Oral daily    Allergies:  No Known Allergies    FAMILY HISTORY:    Social History:  Smoking:  Alcohol:  Drugs:    Review of Systems:  Constitutional: [ ] Fever [ ] Chills [ ] Fatigue [ ] Weight change   HEENT: [ ] Blurred vision [ ] Eye Pain [ ] Headache [ ] Runny nose [ ] Sore Throat   Respiratory: [ ] Cough [ ] Wheezing [ ] Shortness of breath  Cardiovascular: [x] Chest Pain [ ] Palpitations [ ] QUIROS [ ] PND [ ] Orthopnea  Gastrointestinal: [ ] Abdominal Pain [ ] Diarrhea [ ] Constipation [ ] Hemorrhoids [ ] Nausea [ ] Vomiting  Genitourinary: [ ] Nocturia [ ] Dysuria [ ] Incontinence  Extremities: [ ] Swelling [ ] Joint Pain  Neurologic: [ ] Focal deficit [ ] Paresthesias [ ] Syncope  Lymphatic: [ ] Swelling [ ] Lymphadenopathy   Skin: [ ] Rash [ ] Ecchymoses [ ] Wounds [ ] Lesions  Psychiatry: [ ] Depression [ ] Suicidal/Homicidal Ideation [ ] Anxiety [ ] Sleep Disturbances  [ ] 10 point review of systems is otherwise negative except as mentioned above            [ ]Unable to obtain    Physical Exam:  T(C): 36.7 (04-01-19 @ 04:31), Max: 36.7 (04-01-19 @ 04:31)  HR: 55 (04-01-19 @ 06:43) (55 - 78)  BP: 158/107 (04-01-19 @ 06:43) (158/107 - 214/165)  RR: 18 (04-01-19 @ 06:43) (18 - 18)  SpO2: 98% (04-01-19 @ 06:43) (98% - 99%)  Wt(kg): --    Daily Height in cm: 170.18 (01 Apr 2019 04:14)    Daily     Appearance: NAD  Eyes: PERRL, EOMI  HENT: Normal oral mucosa, NC/AT  Cardiovascular: normal S1 and S2, RRR, no m/r/g, 1+ pitting edema (R>L), normal JVP  Procedural Access Site:  No hematoma, non-tender to palpation, 2+ pulses distally, no bruit, no ecchymosis  Respiratory: Clear to auscultation bilaterally, no wheezing, no crackles  Gastrointestinal: Soft, non-tender, non-distended, BS+  Musculoskeletal: No clubbing, no joint deformity   Neurologic: Non-focal  Lymphatic: No lymphadenopathy  Psychiatry: AAOx3, mood & affect appropriate  Skin: No rashes, no ecchymoses, no cyanosis    Cardiovascular Diagnostic Testing:  ECG:    Echo:    Stress Testing:    Cath:    Interpretation of Telemetry:    Imaging:    Labs:                        14.7   9.0   )-----------( 184      ( 01 Apr 2019 04:52 )             43.7     04-01    137  |  103  |  32<H>  ----------------------------<  90  7.3<HH>   |  21<L>  |  2.64<H>    Ca    9.5      01 Apr 2019 04:52  Mg     2.5     04-01    TPro  7.6  /  Alb  3.5  /  TBili  0.3  /  DBili  x   /  AST  96<H>  /  ALT  55<H>  /  AlkPhos  110  04-01    PT/INR - ( 01 Apr 2019 06:15 )   PT: 10.8 sec;   INR: 0.95 ratio         PTT - ( 01 Apr 2019 06:15 )  PTT:98.2 sec  CARDIAC MARKERS ( 01 Apr 2019 04:52 )  x     / x     / 383 U/L / x     / 22.8 ng/mL      Serum Pro-Brain Natriuretic Peptide: 4165 pg/mL (04-01 @ 04:52)

## 2019-04-01 NOTE — H&P ADULT - ASSESSMENT
79 y/o PMH  HTN, DMT2, CAD (stents x2 2000), hypothyroid, depression, transferred from [facility] for NSTEMI and hypertensive urgency; to CCU on nitro gtt, pending cardiac cath.

## 2019-04-01 NOTE — H&P ADULT - PROBLEM SELECTOR PLAN 2
- continue nitroglycerin gtt titrate to MAP 90  - given hydralazine - continue nitroglycerin gtt titrate to MAP 90  - given hydralazine in ED, increase to 50mg Q8hrs, titrate as BP tolerates   -wean nitro off as PO meds increased with good BP control

## 2019-04-01 NOTE — ED PROVIDER NOTE - PHYSICAL EXAMINATION
Attending Valarie Calix: Gen: NAD, heent: atrauamtic, eomi, perrla, mmm, op pink, uvula midline, neck; nttp, no nuchal rigidity, chest: nttp, no crepitus, cv: rrr, no murmurs, lungs: ctab, abd: soft, nontender, nondistended, no peritoneal signs, +BS, no guarding, ext: wwp, neg homans, 1+LE pitting edemaskin: no rash, neuro: awake and alert, following commands, speech clear, sensation and strength intact, no focal deficits

## 2019-04-01 NOTE — H&P ADULT - NSHPSOCIALHISTORY_GEN_ALL_CORE
pt lives with daughter (Rashmi), admits to smoking cigars 4x a day, drinks occasionally, denies illicit drug use  pt lives with daughter (Rashmi), admits to smoking cigars/pipe 4x a day, drinks occasionally, denies illicit drug use

## 2019-04-01 NOTE — ED ADULT NURSE REASSESSMENT NOTE - NS ED NURSE REASSESS COMMENT FT1
Received report from [facility] [employer]. Pt resting comfortably in stretcher. A&Ox4. /109 MD Díaz made aware. Patient denies chest pain or SOB at this time. NSR at 60 bpm on the CM. NAD noted. Pt pending medications sent from pharmacy. Plan of care discussed. safety and comfort measures maintained.

## 2019-04-01 NOTE — ED ADULT NURSE NOTE - NSIMPLEMENTINTERV_GEN_ALL_ED
Implemented All Universal Safety Interventions:  [place] to call system. Call bell, personal items and telephone within reach. Instruct patient to call for assistance. Room bathroom lighting operational. Non-slip footwear when patient is off stretcher. Physically safe environment: no spills, clutter or unnecessary equipment. Stretcher in lowest position, wheels locked, appropriate side rails in place.

## 2019-04-01 NOTE — ED PROVIDER NOTE - CLINICAL SUMMARY MEDICAL DECISION MAKING FREE TEXT BOX
79yo M pmhx htn hld dm cad p/w CC chest pain transferred from [place] for nstemi currently pain free s/p brilinta and on heparin drip, cards fellow aware, will resend labs, repeat ekg and admit 77yo M pmhx htn hld dm cad p/w CC chest pain transferred from [place] for nstemi currently pain free s/p brilinta and on heparin drip, cards fellow aware, will resend labs, repeat ekg and admit  Attending Valarie Calix: 79 y/o male h/o lCAD< HTN, DM, transferred from [facility] for concern for nstemi and hypertensive emrgency. pt received brilinta, aspirin and heparin prior to arrival. In the ed pt found to be sig hypertensive. pt CP free. cards fellow consulted as troponin elevated. will start BP control. no headaches to suggest intracrania pathology. pt denies any chest pain or back discomfort to suggest dissection. will need tele. BP control and tredning of troponins. plan to admit

## 2019-04-02 PROBLEM — E03.9 HYPOTHYROIDISM, UNSPECIFIED: Chronic | Status: ACTIVE | Noted: 2019-03-31

## 2019-04-02 PROBLEM — I10 ESSENTIAL (PRIMARY) HYPERTENSION: Chronic | Status: ACTIVE | Noted: 2019-03-31

## 2019-04-02 PROBLEM — I25.10 ATHEROSCLEROTIC HEART DISEASE OF NATIVE CORONARY ARTERY WITHOUT ANGINA PECTORIS: Chronic | Status: ACTIVE | Noted: 2019-03-31

## 2019-04-02 PROBLEM — E11.9 TYPE 2 DIABETES MELLITUS WITHOUT COMPLICATIONS: Chronic | Status: ACTIVE | Noted: 2019-03-31

## 2019-04-02 PROBLEM — F32.9 MAJOR DEPRESSIVE DISORDER, SINGLE EPISODE, UNSPECIFIED: Chronic | Status: ACTIVE | Noted: 2019-03-31

## 2019-04-02 LAB
ALBUMIN SERPL ELPH-MCNC: 3.3 G/DL — SIGNIFICANT CHANGE UP (ref 3.3–5)
ALP SERPL-CCNC: 102 U/L — SIGNIFICANT CHANGE UP (ref 40–120)
ALT FLD-CCNC: 15 U/L — SIGNIFICANT CHANGE UP (ref 10–45)
ANION GAP SERPL CALC-SCNC: 12 MMOL/L — SIGNIFICANT CHANGE UP (ref 5–17)
ANION GAP SERPL CALC-SCNC: 12 MMOL/L — SIGNIFICANT CHANGE UP (ref 5–17)
ANION GAP SERPL CALC-SCNC: 13 MMOL/L — SIGNIFICANT CHANGE UP (ref 5–17)
APTT BLD: 31.3 SEC — SIGNIFICANT CHANGE UP (ref 27.5–36.3)
AST SERPL-CCNC: 45 U/L — HIGH (ref 10–40)
BASOPHILS # BLD AUTO: 0 K/UL — SIGNIFICANT CHANGE UP (ref 0–0.2)
BASOPHILS NFR BLD AUTO: 0.2 % — SIGNIFICANT CHANGE UP (ref 0–2)
BILIRUB SERPL-MCNC: 0.4 MG/DL — SIGNIFICANT CHANGE UP (ref 0.2–1.2)
BUN SERPL-MCNC: 30 MG/DL — HIGH (ref 7–23)
BUN SERPL-MCNC: 31 MG/DL — HIGH (ref 7–23)
BUN SERPL-MCNC: 35 MG/DL — HIGH (ref 7–23)
CALCIUM SERPL-MCNC: 9.1 MG/DL — SIGNIFICANT CHANGE UP (ref 8.4–10.5)
CALCIUM SERPL-MCNC: 9.2 MG/DL — SIGNIFICANT CHANGE UP (ref 8.4–10.5)
CALCIUM SERPL-MCNC: 9.3 MG/DL — SIGNIFICANT CHANGE UP (ref 8.4–10.5)
CHLORIDE SERPL-SCNC: 105 MMOL/L — SIGNIFICANT CHANGE UP (ref 96–108)
CK MB BLD-MCNC: 1.3 % — SIGNIFICANT CHANGE UP (ref 0–3.5)
CK MB BLD-MCNC: 1.6 % — SIGNIFICANT CHANGE UP (ref 0–3.5)
CK MB BLD-MCNC: 2.9 % — SIGNIFICANT CHANGE UP (ref 0–3.5)
CK MB CFR SERPL CALC: 13.9 NG/ML — HIGH (ref 0–6.7)
CK MB CFR SERPL CALC: 14.8 NG/ML — HIGH (ref 0–6.7)
CK MB CFR SERPL CALC: 15.7 NG/ML — HIGH (ref 0–6.7)
CK SERPL-CCNC: 1144 U/L — HIGH (ref 30–200)
CK SERPL-CCNC: 486 U/L — HIGH (ref 30–200)
CK SERPL-CCNC: 962 U/L — HIGH (ref 30–200)
CO2 SERPL-SCNC: 21 MMOL/L — LOW (ref 22–31)
CO2 SERPL-SCNC: 22 MMOL/L — SIGNIFICANT CHANGE UP (ref 22–31)
CO2 SERPL-SCNC: 23 MMOL/L — SIGNIFICANT CHANGE UP (ref 22–31)
CREAT SERPL-MCNC: 3.02 MG/DL — HIGH (ref 0.5–1.3)
CREAT SERPL-MCNC: 3.04 MG/DL — HIGH (ref 0.5–1.3)
CREAT SERPL-MCNC: 3.16 MG/DL — HIGH (ref 0.5–1.3)
EOSINOPHIL # BLD AUTO: 0.1 K/UL — SIGNIFICANT CHANGE UP (ref 0–0.5)
EOSINOPHIL NFR BLD AUTO: 0.9 % — SIGNIFICANT CHANGE UP (ref 0–6)
GLUCOSE BLDC GLUCOMTR-MCNC: 105 MG/DL — HIGH (ref 70–99)
GLUCOSE BLDC GLUCOMTR-MCNC: 154 MG/DL — HIGH (ref 70–99)
GLUCOSE SERPL-MCNC: 130 MG/DL — HIGH (ref 70–99)
GLUCOSE SERPL-MCNC: 143 MG/DL — HIGH (ref 70–99)
GLUCOSE SERPL-MCNC: 179 MG/DL — HIGH (ref 70–99)
HCT VFR BLD CALC: 42.7 % — SIGNIFICANT CHANGE UP (ref 39–50)
HGB BLD-MCNC: 13.5 G/DL — SIGNIFICANT CHANGE UP (ref 13–17)
LYMPHOCYTES # BLD AUTO: 1.6 K/UL — SIGNIFICANT CHANGE UP (ref 1–3.3)
LYMPHOCYTES # BLD AUTO: 18.2 % — SIGNIFICANT CHANGE UP (ref 13–44)
MAGNESIUM SERPL-MCNC: 2.3 MG/DL — SIGNIFICANT CHANGE UP (ref 1.6–2.6)
MAGNESIUM SERPL-MCNC: 2.3 MG/DL — SIGNIFICANT CHANGE UP (ref 1.6–2.6)
MAGNESIUM SERPL-MCNC: 2.4 MG/DL — SIGNIFICANT CHANGE UP (ref 1.6–2.6)
MCHC RBC-ENTMCNC: 31.2 PG — SIGNIFICANT CHANGE UP (ref 27–34)
MCHC RBC-ENTMCNC: 31.7 GM/DL — LOW (ref 32–36)
MCV RBC AUTO: 98.6 FL — SIGNIFICANT CHANGE UP (ref 80–100)
MONOCYTES # BLD AUTO: 0.5 K/UL — SIGNIFICANT CHANGE UP (ref 0–0.9)
MONOCYTES NFR BLD AUTO: 5.1 % — SIGNIFICANT CHANGE UP (ref 2–14)
NEUTROPHILS # BLD AUTO: 6.9 K/UL — SIGNIFICANT CHANGE UP (ref 1.8–7.4)
NEUTROPHILS NFR BLD AUTO: 75.7 % — SIGNIFICANT CHANGE UP (ref 43–77)
PHOSPHATE SERPL-MCNC: 2.4 MG/DL — LOW (ref 2.5–4.5)
PHOSPHATE SERPL-MCNC: 2.7 MG/DL — SIGNIFICANT CHANGE UP (ref 2.5–4.5)
PLATELET # BLD AUTO: 181 K/UL — SIGNIFICANT CHANGE UP (ref 150–400)
POTASSIUM SERPL-MCNC: 3.9 MMOL/L — SIGNIFICANT CHANGE UP (ref 3.5–5.3)
POTASSIUM SERPL-MCNC: 3.9 MMOL/L — SIGNIFICANT CHANGE UP (ref 3.5–5.3)
POTASSIUM SERPL-MCNC: 4.3 MMOL/L — SIGNIFICANT CHANGE UP (ref 3.5–5.3)
POTASSIUM SERPL-SCNC: 3.9 MMOL/L — SIGNIFICANT CHANGE UP (ref 3.5–5.3)
POTASSIUM SERPL-SCNC: 3.9 MMOL/L — SIGNIFICANT CHANGE UP (ref 3.5–5.3)
POTASSIUM SERPL-SCNC: 4.3 MMOL/L — SIGNIFICANT CHANGE UP (ref 3.5–5.3)
PROT SERPL-MCNC: 6.2 G/DL — SIGNIFICANT CHANGE UP (ref 6–8.3)
RBC # BLD: 4.34 M/UL — SIGNIFICANT CHANGE UP (ref 4.2–5.8)
RBC # FLD: 12.3 % — SIGNIFICANT CHANGE UP (ref 10.3–14.5)
SODIUM SERPL-SCNC: 139 MMOL/L — SIGNIFICANT CHANGE UP (ref 135–145)
SODIUM SERPL-SCNC: 139 MMOL/L — SIGNIFICANT CHANGE UP (ref 135–145)
SODIUM SERPL-SCNC: 140 MMOL/L — SIGNIFICANT CHANGE UP (ref 135–145)
TROPONIN T, HIGH SENSITIVITY RESULT: 575 NG/L — HIGH (ref 0–51)
TROPONIN T, HIGH SENSITIVITY RESULT: 651 NG/L — HIGH (ref 0–51)
TROPONIN T, HIGH SENSITIVITY RESULT: 786 NG/L — HIGH (ref 0–51)
WBC # BLD: 9.1 K/UL — SIGNIFICANT CHANGE UP (ref 3.8–10.5)
WBC # FLD AUTO: 9.1 K/UL — SIGNIFICANT CHANGE UP (ref 3.8–10.5)

## 2019-04-02 PROCEDURE — 93010 ELECTROCARDIOGRAM REPORT: CPT

## 2019-04-02 PROCEDURE — 93970 EXTREMITY STUDY: CPT | Mod: 26

## 2019-04-02 PROCEDURE — 93306 TTE W/DOPPLER COMPLETE: CPT | Mod: 26

## 2019-04-02 PROCEDURE — 99233 SBSQ HOSP IP/OBS HIGH 50: CPT | Mod: GC

## 2019-04-02 RX ORDER — HYDRALAZINE HCL 50 MG
75 TABLET ORAL EVERY 8 HOURS
Qty: 0 | Refills: 0 | Status: DISCONTINUED | OUTPATIENT
Start: 2019-04-02 | End: 2019-04-05

## 2019-04-02 RX ORDER — FENTANYL CITRATE 50 UG/ML
25 INJECTION INTRAVENOUS ONCE
Qty: 0 | Refills: 0 | Status: DISCONTINUED | OUTPATIENT
Start: 2019-04-02 | End: 2019-04-02

## 2019-04-02 RX ORDER — SODIUM CHLORIDE 9 MG/ML
250 INJECTION INTRAMUSCULAR; INTRAVENOUS; SUBCUTANEOUS ONCE
Qty: 0 | Refills: 0 | Status: COMPLETED | OUTPATIENT
Start: 2019-04-02 | End: 2019-04-02

## 2019-04-02 RX ADMIN — Medication 25 MILLIGRAM(S): at 17:50

## 2019-04-02 RX ADMIN — TICAGRELOR 90 MILLIGRAM(S): 90 TABLET ORAL at 17:50

## 2019-04-02 RX ADMIN — Medication 50 MILLIGRAM(S): at 05:24

## 2019-04-02 RX ADMIN — ATORVASTATIN CALCIUM 80 MILLIGRAM(S): 80 TABLET, FILM COATED ORAL at 23:27

## 2019-04-02 RX ADMIN — Medication 81 MILLIGRAM(S): at 10:09

## 2019-04-02 RX ADMIN — TICAGRELOR 90 MILLIGRAM(S): 90 TABLET ORAL at 05:23

## 2019-04-02 RX ADMIN — FENTANYL CITRATE 25 MICROGRAM(S): 50 INJECTION INTRAVENOUS at 01:10

## 2019-04-02 RX ADMIN — SODIUM CHLORIDE 500 MILLILITER(S): 9 INJECTION INTRAMUSCULAR; INTRAVENOUS; SUBCUTANEOUS at 04:39

## 2019-04-02 RX ADMIN — Medication 75 MILLIGRAM(S): at 21:47

## 2019-04-02 RX ADMIN — ISOSORBIDE MONONITRATE 60 MILLIGRAM(S): 60 TABLET, EXTENDED RELEASE ORAL at 10:08

## 2019-04-02 RX ADMIN — PANTOPRAZOLE SODIUM 40 MILLIGRAM(S): 20 TABLET, DELAYED RELEASE ORAL at 06:37

## 2019-04-02 RX ADMIN — Medication 175 MICROGRAM(S): at 05:24

## 2019-04-02 RX ADMIN — Medication 75 MILLIGRAM(S): at 12:04

## 2019-04-02 RX ADMIN — Medication 2: at 12:05

## 2019-04-02 RX ADMIN — Medication 25 MILLIGRAM(S): at 05:23

## 2019-04-02 RX ADMIN — FENTANYL CITRATE 25 MICROGRAM(S): 50 INJECTION INTRAVENOUS at 00:19

## 2019-04-02 NOTE — CHART NOTE - NSCHARTNOTEFT_GEN_A_CORE
====================  CCU MIDNIGHT ROUNDS  ====================    YE OSWALDGO  [id number]    ====================  SUMMARY: HPI:  This is a 78 year od male with history of HTN, DMT2, CAD with stents x2 (2000), hypothyroid, depression, transferred from [facility] this morning for NSTEMI and hypertensive urgency. Pt noted gradually worsening substernal chest pain with radiation to RUE and took   mg x 2 pills prior to coming in to the hospital.  Pt presented to [place] where he was found to be hypertensive 200s/120s with ST depressions in V4-6 and trop I 0.344. Pt was given 10mg iv hydral, 20mg lisinopril, 50mg po lopressor, and 20mg iv lasix prior to transfer to [facility] (4/1). Transferred to CCU for persistent chest discomfort and 's now on nitro gtt and hep gtt but otherwise denied headache, vision changes, lightheadedness/dizziness, palpitations, and SOB, pending cardiac cath. (01 Apr 2019 15:03)    ====================        ====================  NEW EVENTS:  Remains off nitro gtt  ====================        ====================  VITALS (Last 12 hrs):  ====================    T(C): 36.9 (04-02-19 @ 19:00), Max: 37.2 (04-02-19 @ 13:45)  HR: 70 (04-02-19 @ 22:00) (60 - 92)  BP: 172/89 (04-02-19 @ 22:00) (121/59 - 172/89)  BP(mean): 114 (04-02-19 @ 22:00) (78 - 126)  RR: 18 (04-02-19 @ 20:00) (16 - 26)  SpO2: 98% (04-02-19 @ 22:00) (97% - 99%)      TELEMETRY: sinus        I&O's Summary    01 Apr 2019 07:01  -  02 Apr 2019 07:00  --------------------------------------------------------  IN: 855.1 mL / OUT: 1000 mL / NET: -144.9 mL    02 Apr 2019 07:01  -  02 Apr 2019 23:24  --------------------------------------------------------  IN: 240 mL / OUT: 650 mL / NET: -410 mL        ====================  PLAN:  # NSTEMI/ HTN crisis  - nitro gtt off  - c/w hydralazine, imdur, lopressor  - holding ACE due to ALDEN  - s/p MACI x2 to LAD  - c/w DAPT, statin  - trend CE, ekgs PRN  - consider staging to CX if Cr improves  - TTE results noted; EF 45%, regional WMAs  ====================    HEALTH ISSUES - PROBLEM Dx:  Hypothyroid: Hypothyroid  DM (diabetes mellitus): DM (diabetes mellitus)  ALDEN (acute kidney injury): ALDEN (acute kidney injury)  HTN (hypertension): HTN (hypertension)  NSTEMI (non-ST elevated myocardial infarction): NSTEMI (non-ST elevated myocardial infarction)      Augustina Dash, CCU PA #[id number]

## 2019-04-02 NOTE — PROGRESS NOTE ADULT - SUBJECTIVE AND OBJECTIVE BOX
Admission date:    CHIEF COMPLAINT:  HPI: 78 year od male with history of HTN, DMT2, CAD with stents x2 (), hypothyroid, depression, transferred from [facility] this morning for NSTEMI and hypertensive urgency. Pt noted gradually worsening substernal chest pain with radiation to RUE and took   mg x 2 pills prior to coming in to the hospital.  Pt presented to [place] where he was found to be hypertensive 200s/120s with ST depressions in V4-6 and trop I 0.344. Pt was given 10mg iv hydral, 20mg lisinopril, 50mg po lopressor, and 20mg iv lasix prior to transfer to [facility] (). Transferred to CCU for persistent chest discomfort and 's now on nitro gtt and hep gtt but otherwise denied headache, vision changes, lightheadedness/dizziness, palpitations, and SOB, pending cardiac cath. (2019 15:03)    INTERVAL HISTORY: s/p PCI MACI x 2 LAD; ED 16; right femoral sheath removed overnight  Resting comfortably in bed, mild pain right access site.  Right fem soft, no hematoma/ecchymosis    REVIEW OF SYSTEMS: Denies chest pain, SOB palpitations, dizziness, headache, abd pain, NV; all others negative    MEDICATIONS  (STANDING):  aspirin enteric coated 81 milliGRAM(s) Oral daily  atorvastatin 80 milliGRAM(s) Oral at bedtime  dextrose 5%. 1000 milliLiter(s) (50 mL/Hr) IV Continuous <Continuous>  dextrose 50% Injectable 12.5 Gram(s) IV Push once  dextrose 50% Injectable 25 Gram(s) IV Push once  dextrose 50% Injectable 25 Gram(s) IV Push once  hydrALAZINE 50 milliGRAM(s) Oral every 8 hours  insulin lispro (HumaLOG) corrective regimen sliding scale   SubCutaneous three times a day before meals  insulin lispro (HumaLOG) corrective regimen sliding scale   SubCutaneous at bedtime  isosorbide   mononitrate ER Tablet (IMDUR) 60 milliGRAM(s) Oral daily  levothyroxine 175 MICROGram(s) Oral daily  metoprolol tartrate 25 milliGRAM(s) Oral two times a day  pantoprazole    Tablet 40 milliGRAM(s) Oral before breakfast  sodium chloride 0.9% Bolus 250 milliLiter(s) IV Bolus once  ticagrelor 90 milliGRAM(s) Oral two times a day    MEDICATIONS  (PRN):  dextrose 40% Gel 15 Gram(s) Oral once PRN Blood Glucose LESS THAN 70 milliGRAM(s)/deciLiter  glucagon  Injectable 1 milliGRAM(s) IntraMuscular once PRN Glucose <70 milliGRAM(s)/deciLiter      Objective:  ICU Vital Signs Last 24 Hrs  T(C): 37.3 (2019 07:00), Max: 37.3 (2019 07:00)  T(F): 99.2 (2019 07:00), Max: 99.2 (2019 07:00)  HR: 72 (2019 08:00) (60 - 110)  BP: 151/72 (2019 08:00) (56/44 - 198/127)  BP(mean): 97 (2019 08:00) (49 - 153)  ABP: --  ABP(mean): --  RR: 17 (2019 08:00) (10 - 33)  SpO2: 97% (2019 08:00) (95% - 100%)           @ 07:  -  02 @ 07:00  --------------------------------------------------------  IN: 855.1 mL / OUT: 1000 mL / NET: -144.9 mL     @ 07: @ 08:29  --------------------------------------------------------  IN: 0 mL / OUT: 200 mL / NET: -200 mL      Daily     Daily Weight in k.4 (2019 05:31)    PHYSICAL EXAM:  Constitutional:  HEENT:  Respiratory:  Cardiovascular:  Access site:  Gastrointestinal:  Genitourinary:  Extremities:  Vascular:  Neurological:  Skin:      TELEMETRY: SR with PVCs     EKG:       Labs:                          13.5   9.1   )-----------( 181      ( 2019 05:05 )             42.7         139  |  105  |  31<H>  ----------------------------<  143<H>  4.3   |  21<L>  |  3.04<H>    Ca    9.3      2019 05:05  Phos  2.6       Mg     2.4         TPro  6.2  /  Alb  3.3  /  TBili  0.4  /  DBili  x   /  AST  45<H>  /  ALT  15  /  AlkPhos  102  02    LIVER FUNCTIONS - ( 2019 05:05 )  Alb: 3.3 g/dL / Pro: 6.2 g/dL / ALK PHOS: 102 U/L / ALT: 15 U/L / AST: 45 U/L / GGT: x           PT/INR - ( 2019 06:15 )   PT: 10.8 sec;   INR: 0.95 ratio         PTT - ( 2019 05:05 )  PTT:31.3 sec  CKMB Units: 13.9 ng/mL ( @ 05:05)  Creatine Kinase, Serum: 486 U/L ( @ 05:05)  Creatine Kinase, Serum: 313 U/L ( @ 15:20)  CKMB Units: 19.3 ng/mL ( @ 15:20)        HEALTH ISSUES - PROBLEM Dx:  Hypothyroid: Hypothyroid  DM (diabetes mellitus): DM (diabetes mellitus)  ALDEN (acute kidney injury): ALDEN (acute kidney injury)  HTN (hypertension): HTN (hypertension)  NSTEMI (non-ST elevated myocardial infarction): NSTEMI (non-ST elevated myocardial infarction) Admission date:    CHIEF COMPLAINT:  HPI: 78 year od male with history of HTN, DMT2, CAD with stents x2 (), hypothyroid, depression, transferred from [facility] this morning for NSTEMI and hypertensive urgency. Pt noted gradually worsening substernal chest pain with radiation to RUE and took   mg x 2 pills prior to coming in to the hospital.  Pt presented to [place] where he was found to be hypertensive 200s/120s with ST depressions in V4-6 and trop I 0.344. Pt was given 10mg iv hydral, 20mg lisinopril, 50mg po lopressor, and 20mg iv lasix prior to transfer to [facility] (). Transferred to CCU for persistent chest discomfort and 's now on nitro gtt and hep gtt but otherwise denied headache, vision changes, lightheadedness/dizziness, palpitations, and SOB, pending cardiac cath. (2019 15:03)    INTERVAL HISTORY: s/p PCI MACI x 2 LAD; ED 16; right femoral sheath removed overnight  Resting comfortably in bed, mild pain right access site.  Right fem soft, no hematoma/ecchymosis    REVIEW OF SYSTEMS: Denies chest pain, SOB palpitations, dizziness, headache, abd pain, NV; all others negative    MEDICATIONS  (STANDING):  aspirin enteric coated 81 milliGRAM(s) Oral daily  atorvastatin 80 milliGRAM(s) Oral at bedtime  dextrose 5%. 1000 milliLiter(s) (50 mL/Hr) IV Continuous <Continuous>  dextrose 50% Injectable 12.5 Gram(s) IV Push once  dextrose 50% Injectable 25 Gram(s) IV Push once  dextrose 50% Injectable 25 Gram(s) IV Push once  hydrALAZINE 50 milliGRAM(s) Oral every 8 hours  insulin lispro (HumaLOG) corrective regimen sliding scale   SubCutaneous three times a day before meals  insulin lispro (HumaLOG) corrective regimen sliding scale   SubCutaneous at bedtime  isosorbide   mononitrate ER Tablet (IMDUR) 60 milliGRAM(s) Oral daily  levothyroxine 175 MICROGram(s) Oral daily  metoprolol tartrate 25 milliGRAM(s) Oral two times a day  pantoprazole    Tablet 40 milliGRAM(s) Oral before breakfast  sodium chloride 0.9% Bolus 250 milliLiter(s) IV Bolus once  ticagrelor 90 milliGRAM(s) Oral two times a day    MEDICATIONS  (PRN):  dextrose 40% Gel 15 Gram(s) Oral once PRN Blood Glucose LESS THAN 70 milliGRAM(s)/deciLiter  glucagon  Injectable 1 milliGRAM(s) IntraMuscular once PRN Glucose <70 milliGRAM(s)/deciLiter      Objective:  ICU Vital Signs Last 24 Hrs  T(C): 37.3 (2019 07:00), Max: 37.3 (2019 07:00)  T(F): 99.2 (2019 07:00), Max: 99.2 (2019 07:00)  HR: 72 (2019 08:00) (60 - 110)  BP: 151/72 (2019 08:00) (56/44 - 198/127)  BP(mean): 97 (2019 08:00) (49 - 153)  ABP: --  ABP(mean): --  RR: 17 (2019 08:00) (10 - 33)  SpO2: 97% (2019 08:00) (95% - 100%)           @ 07:  -   @ 07:00  --------------------------------------------------------  IN: 855.1 mL / OUT: 1000 mL / NET: -144.9 mL     @ 07: @ 08:29  --------------------------------------------------------  IN: 0 mL / OUT: 200 mL / NET: -200 mL      Daily     Daily Weight in k.4 (2019 05:31)    PHYSICAL EXAM:  Constitutional: NAD  HEENT: oral mucosa pink moist  Respiratory: Regular unlabored CTA  Cardiovascular: RRR S1S2 no M/R/G  Access site: Right femoral - groin soft, no hematoma/ecchymosis  Gastrointestinal: Soft, ND/NT; + bowel sounds  Genitourinary: voiding on own  Extremities: DOMINGO equal strength; mild edema bilat LE R>L  Vascular: warm peripherally; + DP pulses  Neurological: A & O x 3 mood & affect appropriate  Skin: warm dry intact, no rash      TELEMETRY: SR with PVCs     EKG: SR 5; APRIL 154; ; QTc 405      Labs:                          13.5   9.1   )-----------( 181      ( 2019 05:05 )             42.7     04-    139  |  105  |  31<H>  ----------------------------<  143<H>  4.3   |  21<L>  |  3.04<H>    Ca    9.3      2019 05:05  Phos  2.6       Mg     2.4         TPro  6.2  /  Alb  3.3  /  TBili  0.4  /  DBili  x   /  AST  45<H>  /  ALT  15  /  AlkPhos  102  -    LIVER FUNCTIONS - ( 2019 05:05 )  Alb: 3.3 g/dL / Pro: 6.2 g/dL / ALK PHOS: 102 U/L / ALT: 15 U/L / AST: 45 U/L / GGT: x           PT/INR - ( 2019 06:15 )   PT: 10.8 sec;   INR: 0.95 ratio         PTT - ( 2019 05:05 )  PTT:31.3 sec  CKMB Units: 13.9 ng/mL ( @ 05:05)  Creatine Kinase, Serum: 486 U/L ( @ 05:05)  Creatine Kinase, Serum: 313 U/L ( @ 15:20)  CKMB Units: 19.3 ng/mL ( @ 15:20)        HEALTH ISSUES - PROBLEM Dx:  Hypothyroid: Hypothyroid  DM (diabetes mellitus): DM (diabetes mellitus)  ALDEN (acute kidney injury): ALDEN (acute kidney injury)  HTN (hypertension): HTN (hypertension)  NSTEMI (non-ST elevated myocardial infarction): NSTEMI (non-ST elevated myocardial infarction)

## 2019-04-02 NOTE — PROGRESS NOTE ADULT - PROBLEM SELECTOR PLAN 3
-creat incresased post cath to  3.04 today up from 2.90  -avoid neprhotoxic meds,  -monitor I/O's, BMP  -replete electrolytes PRN

## 2019-04-02 NOTE — CHART NOTE - NSCHARTNOTEFT_GEN_A_CORE
Removal of Femoral Sheath    Pulses in the right lower extremity are palpable. The patient was placed in the supine position. The insertion site was identified and the sutures were removed per protocol.  The 6 [place] femoral sheath was then removed. Direct pressure was applied for  21 minutes.     Monitoring of the right groin and both lower extremities including neuro-vascular checks and vital signs every 15 minutes x 4, then every 30 minutes x 2, then every 1 hour was ordered.    Complications: None    Comments: Pt premedicated w/ fentanyl 25mcg. During sheath pull patients BP dropped to SBP80s, 250cc fluid bolus given

## 2019-04-03 DIAGNOSIS — E11.9 TYPE 2 DIABETES MELLITUS WITHOUT COMPLICATIONS: ICD-10-CM

## 2019-04-03 DIAGNOSIS — I16.9 HYPERTENSIVE CRISIS, UNSPECIFIED: ICD-10-CM

## 2019-04-03 DIAGNOSIS — E03.9 HYPOTHYROIDISM, UNSPECIFIED: ICD-10-CM

## 2019-04-03 LAB
ALBUMIN SERPL ELPH-MCNC: 3 G/DL — LOW (ref 3.3–5)
ALP SERPL-CCNC: 94 U/L — SIGNIFICANT CHANGE UP (ref 40–120)
ALT FLD-CCNC: 19 U/L — SIGNIFICANT CHANGE UP (ref 10–45)
ANION GAP SERPL CALC-SCNC: 12 MMOL/L — SIGNIFICANT CHANGE UP (ref 5–17)
APTT BLD: 30.4 SEC — SIGNIFICANT CHANGE UP (ref 27.5–36.3)
AST SERPL-CCNC: 61 U/L — HIGH (ref 10–40)
BASOPHILS # BLD AUTO: 0.1 K/UL — SIGNIFICANT CHANGE UP (ref 0–0.2)
BASOPHILS NFR BLD AUTO: 1 % — SIGNIFICANT CHANGE UP (ref 0–2)
BILIRUB SERPL-MCNC: 0.4 MG/DL — SIGNIFICANT CHANGE UP (ref 0.2–1.2)
BUN SERPL-MCNC: 38 MG/DL — HIGH (ref 7–23)
CALCIUM SERPL-MCNC: 9.2 MG/DL — SIGNIFICANT CHANGE UP (ref 8.4–10.5)
CHLORIDE SERPL-SCNC: 105 MMOL/L — SIGNIFICANT CHANGE UP (ref 96–108)
CK MB BLD-MCNC: 1.1 % — SIGNIFICANT CHANGE UP (ref 0–3.5)
CK MB CFR SERPL CALC: 11.9 NG/ML — HIGH (ref 0–6.7)
CK SERPL-CCNC: 1104 U/L — HIGH (ref 30–200)
CO2 SERPL-SCNC: 22 MMOL/L — SIGNIFICANT CHANGE UP (ref 22–31)
CREAT SERPL-MCNC: 3.3 MG/DL — HIGH (ref 0.5–1.3)
EOSINOPHIL # BLD AUTO: 0.3 K/UL — SIGNIFICANT CHANGE UP (ref 0–0.5)
EOSINOPHIL NFR BLD AUTO: 3.1 % — SIGNIFICANT CHANGE UP (ref 0–6)
GLUCOSE BLDC GLUCOMTR-MCNC: 110 MG/DL — HIGH (ref 70–99)
GLUCOSE BLDC GLUCOMTR-MCNC: 116 MG/DL — HIGH (ref 70–99)
GLUCOSE BLDC GLUCOMTR-MCNC: 122 MG/DL — HIGH (ref 70–99)
GLUCOSE SERPL-MCNC: 127 MG/DL — HIGH (ref 70–99)
HCT VFR BLD CALC: 39.5 % — SIGNIFICANT CHANGE UP (ref 39–50)
HGB BLD-MCNC: 13.3 G/DL — SIGNIFICANT CHANGE UP (ref 13–17)
INR BLD: 0.94 RATIO — SIGNIFICANT CHANGE UP (ref 0.88–1.16)
LYMPHOCYTES # BLD AUTO: 3.1 K/UL — SIGNIFICANT CHANGE UP (ref 1–3.3)
LYMPHOCYTES # BLD AUTO: 34.8 % — SIGNIFICANT CHANGE UP (ref 13–44)
MAGNESIUM SERPL-MCNC: 2.3 MG/DL — SIGNIFICANT CHANGE UP (ref 1.6–2.6)
MCHC RBC-ENTMCNC: 33.5 PG — SIGNIFICANT CHANGE UP (ref 27–34)
MCHC RBC-ENTMCNC: 33.7 GM/DL — SIGNIFICANT CHANGE UP (ref 32–36)
MCV RBC AUTO: 99.5 FL — SIGNIFICANT CHANGE UP (ref 80–100)
MONOCYTES # BLD AUTO: 0.6 K/UL — SIGNIFICANT CHANGE UP (ref 0–0.9)
MONOCYTES NFR BLD AUTO: 6.4 % — SIGNIFICANT CHANGE UP (ref 2–14)
NEUTROPHILS # BLD AUTO: 4.9 K/UL — SIGNIFICANT CHANGE UP (ref 1.8–7.4)
NEUTROPHILS NFR BLD AUTO: 54.8 % — SIGNIFICANT CHANGE UP (ref 43–77)
PHOSPHATE SERPL-MCNC: 2.2 MG/DL — LOW (ref 2.5–4.5)
PLATELET # BLD AUTO: 155 K/UL — SIGNIFICANT CHANGE UP (ref 150–400)
POTASSIUM SERPL-MCNC: 4.1 MMOL/L — SIGNIFICANT CHANGE UP (ref 3.5–5.3)
POTASSIUM SERPL-SCNC: 4.1 MMOL/L — SIGNIFICANT CHANGE UP (ref 3.5–5.3)
PROT SERPL-MCNC: 6.1 G/DL — SIGNIFICANT CHANGE UP (ref 6–8.3)
PROTHROM AB SERPL-ACNC: 10.8 SEC — SIGNIFICANT CHANGE UP (ref 10–12.9)
RBC # BLD: 3.97 M/UL — LOW (ref 4.2–5.8)
RBC # FLD: 12.6 % — SIGNIFICANT CHANGE UP (ref 10.3–14.5)
SODIUM SERPL-SCNC: 139 MMOL/L — SIGNIFICANT CHANGE UP (ref 135–145)
TROPONIN T, HIGH SENSITIVITY RESULT: 815 NG/L — HIGH (ref 0–51)
WBC # BLD: 9 K/UL — SIGNIFICANT CHANGE UP (ref 3.8–10.5)
WBC # FLD AUTO: 9 K/UL — SIGNIFICANT CHANGE UP (ref 3.8–10.5)

## 2019-04-03 PROCEDURE — 93010 ELECTROCARDIOGRAM REPORT: CPT

## 2019-04-03 PROCEDURE — 99233 SBSQ HOSP IP/OBS HIGH 50: CPT | Mod: GC

## 2019-04-03 PROCEDURE — 99233 SBSQ HOSP IP/OBS HIGH 50: CPT

## 2019-04-03 RX ORDER — CHLORHEXIDINE GLUCONATE 213 G/1000ML
1 SOLUTION TOPICAL
Qty: 0 | Refills: 0 | Status: DISCONTINUED | OUTPATIENT
Start: 2019-04-03 | End: 2019-04-08

## 2019-04-03 RX ORDER — HEPARIN SODIUM 5000 [USP'U]/ML
5000 INJECTION INTRAVENOUS; SUBCUTANEOUS EVERY 8 HOURS
Qty: 0 | Refills: 0 | Status: DISCONTINUED | OUTPATIENT
Start: 2019-04-03 | End: 2019-04-08

## 2019-04-03 RX ORDER — ISOSORBIDE MONONITRATE 60 MG/1
90 TABLET, EXTENDED RELEASE ORAL DAILY
Qty: 0 | Refills: 0 | Status: DISCONTINUED | OUTPATIENT
Start: 2019-04-03 | End: 2019-04-08

## 2019-04-03 RX ORDER — SODIUM,POTASSIUM PHOSPHATES 278-250MG
1 POWDER IN PACKET (EA) ORAL
Qty: 0 | Refills: 0 | Status: COMPLETED | OUTPATIENT
Start: 2019-04-03 | End: 2019-04-03

## 2019-04-03 RX ORDER — TICAGRELOR 90 MG/1
1 TABLET ORAL
Qty: 60 | Refills: 3 | OUTPATIENT
Start: 2019-04-03 | End: 2019-07-31

## 2019-04-03 RX ADMIN — Medication 25 MILLIGRAM(S): at 17:49

## 2019-04-03 RX ADMIN — Medication 1 TABLET(S): at 14:54

## 2019-04-03 RX ADMIN — TICAGRELOR 90 MILLIGRAM(S): 90 TABLET ORAL at 05:47

## 2019-04-03 RX ADMIN — Medication 1 TABLET(S): at 10:32

## 2019-04-03 RX ADMIN — CHLORHEXIDINE GLUCONATE 1 APPLICATION(S): 213 SOLUTION TOPICAL at 21:27

## 2019-04-03 RX ADMIN — TICAGRELOR 90 MILLIGRAM(S): 90 TABLET ORAL at 17:49

## 2019-04-03 RX ADMIN — ATORVASTATIN CALCIUM 80 MILLIGRAM(S): 80 TABLET, FILM COATED ORAL at 21:27

## 2019-04-03 RX ADMIN — PANTOPRAZOLE SODIUM 40 MILLIGRAM(S): 20 TABLET, DELAYED RELEASE ORAL at 05:58

## 2019-04-03 RX ADMIN — Medication 1 TABLET(S): at 17:48

## 2019-04-03 RX ADMIN — Medication 175 MICROGRAM(S): at 05:47

## 2019-04-03 RX ADMIN — HEPARIN SODIUM 5000 UNIT(S): 5000 INJECTION INTRAVENOUS; SUBCUTANEOUS at 21:28

## 2019-04-03 RX ADMIN — Medication 75 MILLIGRAM(S): at 14:54

## 2019-04-03 RX ADMIN — Medication 25 MILLIGRAM(S): at 05:47

## 2019-04-03 RX ADMIN — HEPARIN SODIUM 5000 UNIT(S): 5000 INJECTION INTRAVENOUS; SUBCUTANEOUS at 14:54

## 2019-04-03 RX ADMIN — Medication 75 MILLIGRAM(S): at 21:28

## 2019-04-03 RX ADMIN — Medication 75 MILLIGRAM(S): at 05:47

## 2019-04-03 RX ADMIN — ISOSORBIDE MONONITRATE 90 MILLIGRAM(S): 60 TABLET, EXTENDED RELEASE ORAL at 10:32

## 2019-04-03 RX ADMIN — Medication 81 MILLIGRAM(S): at 10:32

## 2019-04-03 NOTE — PROGRESS NOTE ADULT - SUBJECTIVE AND OBJECTIVE BOX
Jamal Jenkins MD, MBA ([facility] Hospitalist)  Pager [phone]  (During off hours please page [phone])      Patient is a 78y old  Male who presents with a chief complaint of Hypertensive urgency, NSTEMI (03 Apr 2019 07:51)      SUBJECTIVE / OVERNIGHT EVENTS: No events overnight. Denies chest pain, shortness of breath.     MEDICATIONS  (STANDING):  DVT pplx:  heparin  Injectable 5000 Unit(s) SubCutaneous every 8 hours  CV:  aspirin enteric coated 81 milliGRAM(s) Oral daily  ticagrelor 90 milliGRAM(s) Oral two times a day  hydrALAZINE 75 milliGRAM(s) Oral every 8 hours  isosorbide   mononitrate ER Tablet (IMDUR) 90 milliGRAM(s) Oral daily  metoprolol tartrate 25 milliGRAM(s) Oral two times a day  atorvastatin 80 milliGRAM(s) Oral at bedtime  Endo:  insulin lispro (HumaLOG) corrective regimen sliding scale   SubCutaneous three times a day before meals  insulin lispro (HumaLOG) corrective regimen sliding scale   SubCutaneous at bedtime  levothyroxine 175 MICROGram(s) Oral daily  GI:  pantoprazole    Tablet 40 milliGRAM(s) Oral before breakfast  Other:  chlorhexidine 4% Liquid 1 Application(s) Topical <User Schedule>  potassium acid phosphate/sodium acid phosphate tablet (K-PHOS No. 2) 1 Tablet(s) Oral four times a day with meals    MEDICATIONS  (PRN):  dextrose 40% Gel 15 Gram(s) Oral once PRN Blood Glucose LESS THAN 70 milliGRAM(s)/deciLiter  glucagon  Injectable 1 milliGRAM(s) IntraMuscular once PRN Glucose <70 milliGRAM(s)/deciLiter      Vital Signs Last 24 Hrs  T(C): 37.6 (03 Apr 2019 14:00), Max: 37.6 (03 Apr 2019 14:00)  T(F): 99.6 (03 Apr 2019 14:00), Max: 99.6 (03 Apr 2019 14:00)  HR: 60 (03 Apr 2019 17:00) (58 - 84)  BP: 145/75 (03 Apr 2019 17:00) (82/56 - 187/98)  BP(mean): 91 (03 Apr 2019 15:00) (64 - 131)  RR: 20 (03 Apr 2019 17:00) (14 - 23)  SpO2: 96% (03 Apr 2019 17:00) (90% - 98%)    CAPILLARY BLOOD GLUCOSE  POCT Blood Glucose.: 110 mg/dL (03 Apr 2019 12:30)  POCT Blood Glucose.: 105 mg/dL (02 Apr 2019 21:53)    I&O's Summary  02 Apr 2019 07:01  -  03 Apr 2019 07:00  --------------------------------------------------------  IN: 680 mL / OUT: 2550 mL / NET: -1870 mL    03 Apr 2019 07:01  -  03 Apr 2019 17:31  --------------------------------------------------------  IN: 240 mL / OUT: 1200 mL / NET: -960 mL        PHYSICAL EXAM:  GENERAL: elderly obese man, NAD  HEAD:  Atraumatic, Normocephalic  EYES: EOMI, conjunctiva and sclera clear  NECK: Supple  CHEST/LUNG: Clear to auscultation bilaterally; No wheeze  HEART: Regular rate and rhythm; No murmurs, rubs, or gallops  ABDOMEN: Soft, Nontender, Nondistended; Bowel sounds present  EXTREMITIES:  No clubbing, cyanosis, or edema  PSYCH: Calm, cooperative   NEUROLOGY: non-focal, AOx3  SKIN: No rashes or lesions    LABS:                        13.3   9.0   )-----------( 155      ( 03 Apr 2019 05:22 )             39.5     04-03    139  |  105  |  38<H>  ----------------------------<  127<H>  4.1   |  22  |  3.30<H>    Ca    9.2      03 Apr 2019 05:22  Phos  2.2     04-03  Mg     2.3     04-03    TPro  6.1  /  Alb  3.0<L>  /  TBili  0.4  /  DBili  x   /  AST  61<H>  /  ALT  19  /  AlkPhos  94  04-03  PT/INR - ( 03 Apr 2019 05:22 )   PT: 10.8 sec;   INR: 0.94 ratio         PTT - ( 03 Apr 2019 05:22 )  PTT:30.4 sec  CARDIAC MARKERS ( 03 Apr 2019 05:22 )  x     / x     / 1104 U/L / x     / 11.9 ng/mL  CARDIAC MARKERS ( 02 Apr 2019 17:25 )  x     / x     / 1144 U/L / x     / 14.8 ng/mL  CARDIAC MARKERS ( 02 Apr 2019 12:29 )  x     / x     / 962 U/L / x     / 15.7 ng/mL  CARDIAC MARKERS ( 02 Apr 2019 05:05 )  x     / x     / 486 U/L / x     / 13.9 ng/mL      RADIOLOGY & ADDITIONAL TESTS:    Imaging Personally Reviewed:   TTE 4/2: LVEF 45-50 %  1. Mitral valve not well visualized, probably normal. Minimal mitral regurgitation.  2. Normal trileaflet aortic valve. No aortic valve regurgitation seen.  3. Mild segmental left ventricular systolic dysfunction. The mid inferolateral wall, the basal  inferolateral wall, the basal anterolateral wall, and the mid anterolateral wall are hypokinetic.  4. Mild diastolic dysfunction (Stage I).  5. Normal right ventricular size and function.    Consultant(s) Notes Reviewed: Cardiology

## 2019-04-03 NOTE — PROGRESS NOTE ADULT - PROBLEM SELECTOR PLAN 2
4/1 s/p PCI with MACI x 2 to pLAD 100%, EDP 16, LCx 80%.  Plan for staged PCI once AK improves.  Continue cardiac meds: aspirin, Brilinta, lipitor, metoprolol 4/1 s/p PCI with MACI x 2 to pLAD 100%, EDP 16, LCx 80%.  Plan for staged PCI once ALDEN improves.  Continue cardiac meds: aspirin, Brilinta, lipitor, metoprolol

## 2019-04-03 NOTE — CHART NOTE - NSCHARTNOTEFT_GEN_A_CORE
CCU Transfer Note    Transfer from: CCU    Transfer to: (  ) Telemetry         Accepting Physician:    Signout given to:     CCU COURSE:  This is a 78 year od male with history of HTN, DMT2, CAD with stents x2 (2000), hypothyroid, depression, transferred from [facility] this morning for NSTEMI and hypertensive urgency. Pt noted gradually worsening substernal chest pain with radiation to RUE and took   mg x 2 pills prior to coming in to the hospital.  Pt presented to [place] where he was found to be hypertensive 200s/120s with ST depressions in V4-6 and trop I 0.344. Pt was given 10mg iv hydral, 20mg lisinopril, 50mg po lopressor, and 20mg iv lasix prior to transfer to [facility] (4/1). Transferred to CCU for persistent chest discomfort and 's now on nitro gtt and hep gtt but otherwise denied headache, vision changes, lightheadedness/dizziness, palpitations, and SOB, pending cardiac cath. 4/1 s/p PCI with MACI x 2 to pLAD 100%, EDP 16, LCx 80% for possible stage PCI when Cr. improves. CE trending down.    PAST MEDICAL & SURGICAL HISTORY:  Hypothyroid  History of kidney stones  Depression  Glaucoma  DM (diabetes mellitus)  CAD (coronary artery disease)  HLD (hyperlipidemia)  HTN (hypertension)  ASHD (arteriosclerotic heart disease)  Hypothyroid  Depression  Diabetes  HTN (hypertension)  History of open heart surgery: CABG x 2V      Vital Signs Last 24 Hrs  T(C): 37.4 (03 Apr 2019 08:00), Max: 37.4 (03 Apr 2019 08:00)  T(F): 99.4 (03 Apr 2019 08:00), Max: 99.4 (03 Apr 2019 08:00)  HR: 76 (03 Apr 2019 11:00) (58 - 92)  BP: 154/72 (03 Apr 2019 11:00) (121/59 - 187/98)  BP(mean): 102 (03 Apr 2019 11:00) (78 - 131)  RR: 23 (03 Apr 2019 08:00) (14 - 23)  SpO2: 95% (03 Apr 2019 11:00) (90% - 99%)  I&O's Summary    02 Apr 2019 07:01  -  03 Apr 2019 07:00  --------------------------------------------------------  IN: 680 mL / OUT: 2550 mL / NET: -1870 mL    03 Apr 2019 07:01  -  03 Apr 2019 12:05  --------------------------------------------------------  IN: 0 mL / OUT: 1000 mL / NET: -1000 mL      Allergies    No Known Allergies    Intolerances      MEDICATIONS  (STANDING):  aspirin enteric coated 81 milliGRAM(s) Oral daily  atorvastatin 80 milliGRAM(s) Oral at bedtime  chlorhexidine 4% Liquid 1 Application(s) Topical <User Schedule>  dextrose 5%. 1000 milliLiter(s) (50 mL/Hr) IV Continuous <Continuous>  dextrose 50% Injectable 12.5 Gram(s) IV Push once  dextrose 50% Injectable 25 Gram(s) IV Push once  dextrose 50% Injectable 25 Gram(s) IV Push once  heparin  Injectable 5000 Unit(s) SubCutaneous every 8 hours  hydrALAZINE 75 milliGRAM(s) Oral every 8 hours  insulin lispro (HumaLOG) corrective regimen sliding scale   SubCutaneous three times a day before meals  insulin lispro (HumaLOG) corrective regimen sliding scale   SubCutaneous at bedtime  isosorbide   mononitrate ER Tablet (IMDUR) 90 milliGRAM(s) Oral daily  levothyroxine 175 MICROGram(s) Oral daily  metoprolol tartrate 25 milliGRAM(s) Oral two times a day  pantoprazole    Tablet 40 milliGRAM(s) Oral before breakfast  potassium acid phosphate/sodium acid phosphate tablet (K-PHOS No. 2) 1 Tablet(s) Oral four times a day with meals  ticagrelor 90 milliGRAM(s) Oral two times a day    MEDICATIONS  (PRN):  dextrose 40% Gel 15 Gram(s) Oral once PRN Blood Glucose LESS THAN 70 milliGRAM(s)/deciLiter  glucagon  Injectable 1 milliGRAM(s) IntraMuscular once PRN Glucose <70 milliGRAM(s)/deciLiter      Adult Advanced Hemodynamics Last 24 Hrs      CARDIAC MARKERS ( 03 Apr 2019 05:22 )  x     / x     / 1104 U/L / x     / 11.9 ng/mL  CARDIAC MARKERS ( 02 Apr 2019 17:25 )  x     / x     / 1144 U/L / x     / 14.8 ng/mL  CARDIAC MARKERS ( 02 Apr 2019 12:29 )  x     / x     / 962 U/L / x     / 15.7 ng/mL  CARDIAC MARKERS ( 02 Apr 2019 05:05 )  x     / x     / 486 U/L / x     / 13.9 ng/mL  CARDIAC MARKERS ( 01 Apr 2019 15:20 )  x     / x     / 313 U/L / x     / 19.3 ng/mL                            13.3   9.0   )-----------( 155      ( 03 Apr 2019 05:22 )             39.5     04-03    139  |  105  |  38<H>  ----------------------------<  127<H>  4.1   |  22  |  3.30<H>    Ca    9.2      03 Apr 2019 05:22  Phos  2.2     04-03  Mg     2.3     04-03    TPro  6.1  /  Alb  3.0<L>  /  TBili  0.4  /  DBili  x   /  AST  61<H>  /  ALT  19  /  AlkPhos  94  04-03    PT/INR - ( 03 Apr 2019 05:22 )   PT: 10.8 sec;   INR: 0.94 ratio         PTT - ( 03 Apr 2019 05:22 )  PTT:30.4 sec    PHYSICAL EXAM:  General: WN/WD NAD  Neurology: Awake, nonfocal, DOMINGO x 4  Eyes: Scleras clear, PERRLA/ EOMI, Gross vision intact  ENT:Gross hearing intact, grossly patent pharynx, no stridor  Neck: Neck supple, trachea midline, No JVD,   Respiratory: CTA B/L, No wheezing, rales, rhonchi  CV: RRR, S1S2, no murmurs, rubs or gallops  Abdominal: Soft, NT, ND +BS,   Extremities: + edema, + peripheral pulses  Skin: No Rashes, Hematoma, Ecchymosis  Lymphatic: No Neck, axilla, groin LAD  Psych: Oriented x 3, normal affect  Incisions: right groin benign    Lactate:    Ekg:  < from: 12 Lead ECG (04.02.19 @ 07:13) >    Ventricular Rate 75 BPM    Atrial Rate 75 BPM    P-R Interval 154 ms    QRS Duration 104 ms    Q-T Interval 363 ms    QTC Calculation(Bezet) 405 ms    P Axis 52 degrees    R Axis 19 degrees    T Axis -31 degrees    Diagnosis Line NORMAL SINUS RHYTHM  NONSPECIFIC ST AND T WAVE ABNORMALITY  ABNORMAL ECG        Telemetry:  Echo:  < from: TTE with Doppler (w/Cont) (04.02.19 @ 06:37) >    Conclusions: EF 45 - 50  1. Mitral valve not well visualized, probably normal.  Minimal mitral regurgitation.  2. Normal trileaflet aortic valve. No aortic valve  regurgitation seen.  3. Mild segmental left ventricular systolic dysfunction.  The mid inferolateral wall, the basal  inferolateral wall,  the basal anterolateral wall, and the mid anterolateral  wall are hypokinetic.  4. Mild diastolic dysfunction (Stage I).  5. Normal right ventricular size and function.  *** No previous Echo exam.      Cardiac Cath:  Stress Test:  Imaging:    ASSESSMENT & PLAN:   78M transferred for hypertensive emergency and NSTEMI s/p LHC on 4/1 with MACI x2 100% LAD. LHC also with 90% D1, 90% D2 and 80% pCx. TTE 4/2 with EF 45-50% with WMAs, stage I diastolic dysfunction, minimal IA, Nml RVSF.    - Continue DAPT, statin, continue metoprolol tartrate (will switch to Coreg if needed for better BP control)  - CK/MB have peaked, stop trending  - Needs staged PCI with Dr. Hagan when Cr improves  - Start ACEi/ARB when ALDEN improves  - Increase Imdur to 90 mg daily, continue hydralazine 75 mg q8H  - Monitor UOP, Cr      FOR FOLLOW UP:  Increased imdur to 90 mg daily; If BP remains elevated change metoprolol to carvedilol  Out of Bed, encourage Ambulation  Cardiology Consult Team CCU Transfer Note    Transfer from: CCU    Transfer to: (  ) Telemetry         Accepting Physician: Colby Wilcox MD    Signout given to: Colby Wilcox MD; Medicine NP    CCU COURSE:  This is a 78 year od male with history of HTN, DMT2, CAD with stents x2 (2000), hypothyroid, depression, transferred from [facility] this morning for NSTEMI and hypertensive urgency. Pt noted gradually worsening substernal chest pain with radiation to RUE and took   mg x 2 pills prior to coming in to the hospital.  Pt presented to [place] where he was found to be hypertensive 200s/120s with ST depressions in V4-6 and trop I 0.344. Pt was given 10mg iv hydral, 20mg lisinopril, 50mg po lopressor, and 20mg iv lasix prior to transfer to [facility] (4/1). Transferred to CCU for persistent chest discomfort and 's now on nitro gtt and hep gtt but otherwise denied headache, vision changes, lightheadedness/dizziness, palpitations, and SOB, pending cardiac cath. 4/1 s/p PCI with MACI x 2 to pLAD 100%, EDP 16, LCx 80% for possible stage PCI when Cr. improves. CE trending down.    PAST MEDICAL & SURGICAL HISTORY:  Hypothyroid  History of kidney stones  Depression  Glaucoma  DM (diabetes mellitus)  CAD (coronary artery disease)  HLD (hyperlipidemia)  HTN (hypertension)  ASHD (arteriosclerotic heart disease)  Hypothyroid  Depression  Diabetes  HTN (hypertension)  History of open heart surgery: CABG x 2V      Vital Signs Last 24 Hrs  T(C): 37.4 (03 Apr 2019 08:00), Max: 37.4 (03 Apr 2019 08:00)  T(F): 99.4 (03 Apr 2019 08:00), Max: 99.4 (03 Apr 2019 08:00)  HR: 76 (03 Apr 2019 11:00) (58 - 92)  BP: 154/72 (03 Apr 2019 11:00) (121/59 - 187/98)  BP(mean): 102 (03 Apr 2019 11:00) (78 - 131)  RR: 23 (03 Apr 2019 08:00) (14 - 23)  SpO2: 95% (03 Apr 2019 11:00) (90% - 99%)  I&O's Summary    02 Apr 2019 07:01  -  03 Apr 2019 07:00  --------------------------------------------------------  IN: 680 mL / OUT: 2550 mL / NET: -1870 mL    03 Apr 2019 07:01  -  03 Apr 2019 12:05  --------------------------------------------------------  IN: 0 mL / OUT: 1000 mL / NET: -1000 mL      Allergies    No Known Allergies    Intolerances      MEDICATIONS  (STANDING):  aspirin enteric coated 81 milliGRAM(s) Oral daily  atorvastatin 80 milliGRAM(s) Oral at bedtime  chlorhexidine 4% Liquid 1 Application(s) Topical <User Schedule>  dextrose 5%. 1000 milliLiter(s) (50 mL/Hr) IV Continuous <Continuous>  dextrose 50% Injectable 12.5 Gram(s) IV Push once  dextrose 50% Injectable 25 Gram(s) IV Push once  dextrose 50% Injectable 25 Gram(s) IV Push once  heparin  Injectable 5000 Unit(s) SubCutaneous every 8 hours  hydrALAZINE 75 milliGRAM(s) Oral every 8 hours  insulin lispro (HumaLOG) corrective regimen sliding scale   SubCutaneous three times a day before meals  insulin lispro (HumaLOG) corrective regimen sliding scale   SubCutaneous at bedtime  isosorbide   mononitrate ER Tablet (IMDUR) 90 milliGRAM(s) Oral daily  levothyroxine 175 MICROGram(s) Oral daily  metoprolol tartrate 25 milliGRAM(s) Oral two times a day  pantoprazole    Tablet 40 milliGRAM(s) Oral before breakfast  potassium acid phosphate/sodium acid phosphate tablet (K-PHOS No. 2) 1 Tablet(s) Oral four times a day with meals  ticagrelor 90 milliGRAM(s) Oral two times a day    MEDICATIONS  (PRN):  dextrose 40% Gel 15 Gram(s) Oral once PRN Blood Glucose LESS THAN 70 milliGRAM(s)/deciLiter  glucagon  Injectable 1 milliGRAM(s) IntraMuscular once PRN Glucose <70 milliGRAM(s)/deciLiter      Adult Advanced Hemodynamics Last 24 Hrs      CARDIAC MARKERS ( 03 Apr 2019 05:22 )  x     / x     / 1104 U/L / x     / 11.9 ng/mL  CARDIAC MARKERS ( 02 Apr 2019 17:25 )  x     / x     / 1144 U/L / x     / 14.8 ng/mL  CARDIAC MARKERS ( 02 Apr 2019 12:29 )  x     / x     / 962 U/L / x     / 15.7 ng/mL  CARDIAC MARKERS ( 02 Apr 2019 05:05 )  x     / x     / 486 U/L / x     / 13.9 ng/mL  CARDIAC MARKERS ( 01 Apr 2019 15:20 )  x     / x     / 313 U/L / x     / 19.3 ng/mL                            13.3   9.0   )-----------( 155      ( 03 Apr 2019 05:22 )             39.5     04-03    139  |  105  |  38<H>  ----------------------------<  127<H>  4.1   |  22  |  3.30<H>    Ca    9.2      03 Apr 2019 05:22  Phos  2.2     04-03  Mg     2.3     04-03    TPro  6.1  /  Alb  3.0<L>  /  TBili  0.4  /  DBili  x   /  AST  61<H>  /  ALT  19  /  AlkPhos  94  04-03    PT/INR - ( 03 Apr 2019 05:22 )   PT: 10.8 sec;   INR: 0.94 ratio         PTT - ( 03 Apr 2019 05:22 )  PTT:30.4 sec    PHYSICAL EXAM:  General: WN/WD NAD  Neurology: Awake, nonfocal, DOMINGO x 4  Eyes: Scleras clear, PERRLA/ EOMI, Gross vision intact  ENT:Gross hearing intact, grossly patent pharynx, no stridor  Neck: Neck supple, trachea midline, No JVD,   Respiratory: CTA B/L, No wheezing, rales, rhonchi  CV: RRR, S1S2, no murmurs, rubs or gallops  Abdominal: Soft, NT, ND +BS,   Extremities: + edema, + peripheral pulses  Skin: No Rashes, Hematoma, Ecchymosis  Lymphatic: No Neck, axilla, groin LAD  Psych: Oriented x 3, normal affect  Incisions: right groin benign    Lactate:    Ekg:  < from: 12 Lead ECG (04.02.19 @ 07:13) >    Ventricular Rate 75 BPM    Atrial Rate 75 BPM    P-R Interval 154 ms    QRS Duration 104 ms    Q-T Interval 363 ms    QTC Calculation(Bezet) 405 ms    P Axis 52 degrees    R Axis 19 degrees    T Axis -31 degrees    Diagnosis Line NORMAL SINUS RHYTHM  NONSPECIFIC ST AND T WAVE ABNORMALITY  ABNORMAL ECG        Telemetry:  Echo:  < from: TTE with Doppler (w/Cont) (04.02.19 @ 06:37) >    Conclusions: EF 45 - 50  1. Mitral valve not well visualized, probably normal.  Minimal mitral regurgitation.  2. Normal trileaflet aortic valve. No aortic valve  regurgitation seen.  3. Mild segmental left ventricular systolic dysfunction.  The mid inferolateral wall, the basal  inferolateral wall,  the basal anterolateral wall, and the mid anterolateral  wall are hypokinetic.  4. Mild diastolic dysfunction (Stage I).  5. Normal right ventricular size and function.  *** No previous Echo exam.      Cardiac Cath:  Stress Test:  Imaging:    ASSESSMENT & PLAN:   78M transferred for hypertensive emergency and NSTEMI s/p LHC on 4/1 with MACI x2 100% LAD. LHC also with 90% D1, 90% D2 and 80% pCx. TTE 4/2 with EF 45-50% with WMAs, stage I diastolic dysfunction, minimal AL, Nml RVSF.    - Continue DAPT, statin, continue metoprolol tartrate (will switch to Coreg if needed for better BP control)  - CK/MB have peaked, stop trending  - Needs staged PCI with Dr. Hagan when Cr improves  - Start ACEi/ARB when ALDEN improves  - Increase Imdur to 90 mg daily, continue hydralazine 75 mg q8H  - Monitor UOP, Cr      FOR FOLLOW UP:  Increased imdur to 90 mg daily; If BP remains elevated change metoprolol to carvedilol  Out of Bed, encourage Ambulation  Cardiology Consult Team CCU Transfer Note    Transfer from: CCU    Transfer to: (x  ) Telemetry   2 dsu 252D     Accepting Physician: Colby Wilcox MD    Signout given to: Colby Wilcox MD; Medicine NP    CCU COURSE:  This is a 78 year od male with history of HTN, DMT2, CAD with stents x2 (2000), hypothyroid, depression, transferred from [facility] this morning for NSTEMI and hypertensive urgency. Pt noted gradually worsening substernal chest pain with radiation to RUE and took   mg x 2 pills prior to coming in to the hospital.  Pt presented to [place] where he was found to be hypertensive 200s/120s with ST depressions in V4-6 and trop I 0.344. Pt was given 10mg iv hydral, 20mg lisinopril, 50mg po lopressor, and 20mg iv lasix prior to transfer to [facility] (4/1). Transferred to CCU for persistent chest discomfort and 's now on nitro gtt and hep gtt but otherwise denied headache, vision changes, lightheadedness/dizziness, palpitations, and SOB, pending cardiac cath. 4/1 s/p PCI with MACI x 2 to pLAD 100%, EDP 16, LCx 80% for possible stage PCI when Cr. improves. CE trending down.    PAST MEDICAL & SURGICAL HISTORY:  Hypothyroid  History of kidney stones  Depression  Glaucoma  DM (diabetes mellitus)  CAD (coronary artery disease)  HLD (hyperlipidemia)  HTN (hypertension)  ASHD (arteriosclerotic heart disease)  Hypothyroid  Depression  Diabetes  HTN (hypertension)  History of open heart surgery: CABG x 2V      Vital Signs Last 24 Hrs  T(C): 37.4 (03 Apr 2019 08:00), Max: 37.4 (03 Apr 2019 08:00)  T(F): 99.4 (03 Apr 2019 08:00), Max: 99.4 (03 Apr 2019 08:00)  HR: 76 (03 Apr 2019 11:00) (58 - 92)  BP: 154/72 (03 Apr 2019 11:00) (121/59 - 187/98)  BP(mean): 102 (03 Apr 2019 11:00) (78 - 131)  RR: 23 (03 Apr 2019 08:00) (14 - 23)  SpO2: 95% (03 Apr 2019 11:00) (90% - 99%)  I&O's Summary    02 Apr 2019 07:01  -  03 Apr 2019 07:00  --------------------------------------------------------  IN: 680 mL / OUT: 2550 mL / NET: -1870 mL    03 Apr 2019 07:01  -  03 Apr 2019 12:05  --------------------------------------------------------  IN: 0 mL / OUT: 1000 mL / NET: -1000 mL      Allergies    No Known Allergies    Intolerances      MEDICATIONS  (STANDING):  aspirin enteric coated 81 milliGRAM(s) Oral daily  atorvastatin 80 milliGRAM(s) Oral at bedtime  chlorhexidine 4% Liquid 1 Application(s) Topical <User Schedule>  dextrose 5%. 1000 milliLiter(s) (50 mL/Hr) IV Continuous <Continuous>  dextrose 50% Injectable 12.5 Gram(s) IV Push once  dextrose 50% Injectable 25 Gram(s) IV Push once  dextrose 50% Injectable 25 Gram(s) IV Push once  heparin  Injectable 5000 Unit(s) SubCutaneous every 8 hours  hydrALAZINE 75 milliGRAM(s) Oral every 8 hours  insulin lispro (HumaLOG) corrective regimen sliding scale   SubCutaneous three times a day before meals  insulin lispro (HumaLOG) corrective regimen sliding scale   SubCutaneous at bedtime  isosorbide   mononitrate ER Tablet (IMDUR) 90 milliGRAM(s) Oral daily  levothyroxine 175 MICROGram(s) Oral daily  metoprolol tartrate 25 milliGRAM(s) Oral two times a day  pantoprazole    Tablet 40 milliGRAM(s) Oral before breakfast  potassium acid phosphate/sodium acid phosphate tablet (K-PHOS No. 2) 1 Tablet(s) Oral four times a day with meals  ticagrelor 90 milliGRAM(s) Oral two times a day    MEDICATIONS  (PRN):  dextrose 40% Gel 15 Gram(s) Oral once PRN Blood Glucose LESS THAN 70 milliGRAM(s)/deciLiter  glucagon  Injectable 1 milliGRAM(s) IntraMuscular once PRN Glucose <70 milliGRAM(s)/deciLiter      Adult Advanced Hemodynamics Last 24 Hrs      CARDIAC MARKERS ( 03 Apr 2019 05:22 )  x     / x     / 1104 U/L / x     / 11.9 ng/mL  CARDIAC MARKERS ( 02 Apr 2019 17:25 )  x     / x     / 1144 U/L / x     / 14.8 ng/mL  CARDIAC MARKERS ( 02 Apr 2019 12:29 )  x     / x     / 962 U/L / x     / 15.7 ng/mL  CARDIAC MARKERS ( 02 Apr 2019 05:05 )  x     / x     / 486 U/L / x     / 13.9 ng/mL  CARDIAC MARKERS ( 01 Apr 2019 15:20 )  x     / x     / 313 U/L / x     / 19.3 ng/mL                            13.3   9.0   )-----------( 155      ( 03 Apr 2019 05:22 )             39.5     04-03    139  |  105  |  38<H>  ----------------------------<  127<H>  4.1   |  22  |  3.30<H>    Ca    9.2      03 Apr 2019 05:22  Phos  2.2     04-03  Mg     2.3     04-03    TPro  6.1  /  Alb  3.0<L>  /  TBili  0.4  /  DBili  x   /  AST  61<H>  /  ALT  19  /  AlkPhos  94  04-03    PT/INR - ( 03 Apr 2019 05:22 )   PT: 10.8 sec;   INR: 0.94 ratio         PTT - ( 03 Apr 2019 05:22 )  PTT:30.4 sec    PHYSICAL EXAM:  General: WN/WD NAD  Neurology: Awake, nonfocal, DOMINGO x 4  Eyes: Scleras clear, PERRLA/ EOMI, Gross vision intact  ENT:Gross hearing intact, grossly patent pharynx, no stridor  Neck: Neck supple, trachea midline, No JVD,   Respiratory: CTA B/L, No wheezing, rales, rhonchi  CV: RRR, S1S2, no murmurs, rubs or gallops  Abdominal: Soft, NT, ND +BS,   Extremities: + edema, + peripheral pulses  Skin: No Rashes, Hematoma, Ecchymosis  Lymphatic: No Neck, axilla, groin LAD  Psych: Oriented x 3, normal affect  Incisions: right groin benign    Lactate:    Ekg:  < from: 12 Lead ECG (04.02.19 @ 07:13) >    Ventricular Rate 75 BPM    Atrial Rate 75 BPM    P-R Interval 154 ms    QRS Duration 104 ms    Q-T Interval 363 ms    QTC Calculation(Bezet) 405 ms    P Axis 52 degrees    R Axis 19 degrees    T Axis -31 degrees    Diagnosis Line NORMAL SINUS RHYTHM  NONSPECIFIC ST AND T WAVE ABNORMALITY  ABNORMAL ECG        Telemetry:  Echo:  < from: TTE with Doppler (w/Cont) (04.02.19 @ 06:37) >    Conclusions: EF 45 - 50  1. Mitral valve not well visualized, probably normal.  Minimal mitral regurgitation.  2. Normal trileaflet aortic valve. No aortic valve  regurgitation seen.  3. Mild segmental left ventricular systolic dysfunction.  The mid inferolateral wall, the basal  inferolateral wall,  the basal anterolateral wall, and the mid anterolateral  wall are hypokinetic.  4. Mild diastolic dysfunction (Stage I).  5. Normal right ventricular size and function.  *** No previous Echo exam.      Cardiac Cath:  Stress Test:  Imaging:    ASSESSMENT & PLAN:   78M transferred for hypertensive emergency and NSTEMI s/p LHC on 4/1 with MACI x2 100% LAD. LHC also with 90% D1, 90% D2 and 80% pCx. TTE 4/2 with EF 45-50% with WMAs, stage I diastolic dysfunction, minimal LA, Nml RVSF.    - Continue DAPT, statin, continue metoprolol tartrate (will switch to Coreg if needed for better BP control)  - CK/MB have peaked, stop trending  - Needs staged PCI with Dr. Hagan when Cr improves  - Start ACEi/ARB when ALDEN improves  - Increase Imdur to 90 mg daily, continue hydralazine 75 mg q8H  - Monitor UOP, Cr      FOR FOLLOW UP:  Increased imdur to 90 mg daily; If BP remains elevated change metoprolol to carvedilol  Out of Bed, encourage Ambulation  Cardiology Consult Team

## 2019-04-03 NOTE — PROGRESS NOTE ADULT - PROBLEM SELECTOR PLAN 5
HgA1c 6.3. Will need to confirm home hypoglycemic agents - med rec is not complete.  Continue HISS for now.  Monitor FS qAC, qHS.  Hypoglycemia protocol.  Will need close follow up with Dr. Zheng Joya (PMD) [phone]

## 2019-04-03 NOTE — PROGRESS NOTE ADULT - PROBLEM SELECTOR PLAN 3
Continue current regimen: hydralazine 75 mg TID, imdur 90 mg daily, metoprolol tartrate 25 mg BID, atorvastatin 80 mg qHS  Monitor vital signs closely.

## 2019-04-03 NOTE — PROGRESS NOTE ADULT - SUBJECTIVE AND OBJECTIVE BOX
Admission date:  CHIEF COMPLAINT:  HPI:  This is a 78 year od male with history of HTN, DMT2, CAD with stents x2 (), hypothyroid, depression, transferred from [facility] this morning for NSTEMI and hypertensive urgency. Pt noted gradually worsening substernal chest pain with radiation to RUE and took   mg x 2 pills prior to coming in to the hospital.  Pt presented to [facility] where he was found to be hypertensive 200s/120s with ST depressions in V4-6 and trop I 0.344. Pt was given 10mg iv hydral, 20mg lisinopril, 50mg po lopressor, and 20mg iv lasix prior to transfer to [facility] (). Transferred to CCU for persistent chest discomfort and 's now on nitro gtt and hep gtt but otherwise denied headache, vision changes, lightheadedness/dizziness, palpitations, and SOB, pending cardiac cath. (2019 15:03)    INTERVAL HISTORY: Patient seen and examined, denies CP, dyspnea, orthopnea, palpitations and able to lay flat.    REVIEW OF SYSTEMS:    CONSTITUTIONAL: No weakness, fevers or chills  EYES/ENT: No visual changes;  No vertigo or throat pain   NECK: No pain or stiffness  RESPIRATORY: No cough, wheezing, hemoptysis; No shortness of breath  CARDIOVASCULAR: No chest pain or palpitations  GASTROINTESTINAL: No abdominal or epigastric pain. No nausea, vomiting, or hematemesis; No diarrhea or constipation. No melena or hematochezia.  GENITOURINARY: No dysuria, frequency or hematuria  NEUROLOGICAL: No numbness or weakness  SKIN: No itching, rashes      MEDICATIONS  (STANDING):  aspirin enteric coated 81 milliGRAM(s) Oral daily  atorvastatin 80 milliGRAM(s) Oral at bedtime  dextrose 5%. 1000 milliLiter(s) (50 mL/Hr) IV Continuous <Continuous>  dextrose 50% Injectable 12.5 Gram(s) IV Push once  dextrose 50% Injectable 25 Gram(s) IV Push once  dextrose 50% Injectable 25 Gram(s) IV Push once  hydrALAZINE 75 milliGRAM(s) Oral every 8 hours  insulin lispro (HumaLOG) corrective regimen sliding scale   SubCutaneous three times a day before meals  insulin lispro (HumaLOG) corrective regimen sliding scale   SubCutaneous at bedtime  isosorbide   mononitrate ER Tablet (IMDUR) 60 milliGRAM(s) Oral daily  levothyroxine 175 MICROGram(s) Oral daily  metoprolol tartrate 25 milliGRAM(s) Oral two times a day  pantoprazole    Tablet 40 milliGRAM(s) Oral before breakfast  ticagrelor 90 milliGRAM(s) Oral two times a day    MEDICATIONS  (PRN):  dextrose 40% Gel 15 Gram(s) Oral once PRN Blood Glucose LESS THAN 70 milliGRAM(s)/deciLiter  glucagon  Injectable 1 milliGRAM(s) IntraMuscular once PRN Glucose <70 milliGRAM(s)/deciLiter      Objective:  ICU Vital Signs Last 24 Hrs  T(C): 36.9 (2019 04:00), Max: 37.2 (2019 13:45)  T(F): 98.4 (2019 04:00), Max: 99 (2019 13:45)  HR: 70 (2019 07:00) (58 - 92)  BP: 145/78 (2019 07:00) (121/59 - 187/98)  BP(mean): 112 (2019 07:00) (78 - 131)  ABP: --  ABP(mean): --  RR: 17 (2019 07:00) (14 - 26)  SpO2: 95% (2019 07:00) (90% - 99%)      Adult Advanced Hemodynamics Last 24 Hrs      -02 @ 07:01  -  04-03 @ 07:00  --------------------------------------------------------  IN: 680 mL / OUT: 2550 mL / NET: -1870 mL      Daily Weight in k.5 (2019 05:00)    PHYSICAL EXAM:  General: WN/WD NAD  Neurology: Awake, nonfocal, DOMINGO x 4  Eyes: Scleras clear, PERRLA/ EOMI, Gross vision intact  ENT:Gross hearing intact, grossly patent pharynx, no stridor  Neck: Neck supple, trachea midline, No JVD,   Respiratory: CTA B/L, No wheezing, rales, rhonchi  CV: RRR, S1S2, no murmurs, rubs or gallops  Abdominal: Soft, NT, ND +BS,   Extremities: No edema, + peripheral pulses  Skin: No Rashes, Hematoma, Ecchymosis  Lymphatic: No Neck, axilla, groin LAD  Psych: Oriented x 3, normal affect  Incisions:         TELEMETRY:     EKG:   < from: 12 Lead ECG (19 @ 07:13) >  Ventricular Rate 75 BPM    Atrial Rate 75 BPM    P-R Interval 154 ms    QRS Duration 104 ms    Q-T Interval 363 ms    QTC Calculation(Bezet) 405 ms    P Axis 52 degrees    R Axis 19 degrees    T Axis -31 degrees    Diagnosis Line NORMAL SINUS RHYTHM  NONSPECIFIC ST AND T WAVE ABNORMALITY  ABNORMAL ECG        IMAGING:  < from: TTE with Doppler (w/Cont) (19 @ 06:37) >  Conclusions: EF 45-50%  1. Mitral valve not well visualized, probably normal.  Minimal mitral regurgitation.  2. Normal trileaflet aortic valve. No aortic valve  regurgitation seen.  3. Mild segmental left ventricular systolic dysfunction.  The mid inferolateral wall, the basal  inferolateral wall,  the basal anterolateral wall, and the mid anterolateral  wall are hypokinetic.  4. Mild diastolic dysfunction (Stage I).  5. Normal right ventricular size and function.  *** No previous Echo exam.      Labs:                          13.3   9.0   )-----------( 155      ( 2019 05:22 )             39.5     04-03    139  |  105  |  38<H>  ----------------------------<  127<H>  4.1   |  22  |  3.30<H>    Ca    9.2      2019 05:22  Phos  2.2     04-03  Mg     2.3     04-03    TPro  6.1  /  Alb  3.0<L>  /  TBili  0.4  /  DBili  x   /  AST  61<H>  /  ALT  19  /  AlkPhos  94  04-03    LIVER FUNCTIONS - ( 2019 05:22 )  Alb: 3.0 g/dL / Pro: 6.1 g/dL / ALK PHOS: 94 U/L / ALT: 19 U/L / AST: 61 U/L / GGT: x           PT/INR - ( 2019 05:22 )   PT: 10.8 sec;   INR: 0.94 ratio         PTT - ( 2019 05:22 )  PTT:30.4 sec  CKMB Units: 11.9 ng/mL ( @ 05:22)  CKMB Units: 14.8 ng/mL ( @ 17:25)  Creatine Kinase, Serum: 1144 U/L ( @ 17:25)  Creatine Kinase, Serum: 962 U/L ( @ 12:29)  CKMB Units: 15.7 ng/mL ( @ 12:29)        HEALTH ISSUES - PROBLEM Dx:  Hypothyroid: Hypothyroid  DM (diabetes mellitus): DM (diabetes mellitus)  ALDEN (acute kidney injury): ALDEN (acute kidney injury)  HTN (hypertension): HTN (hypertension)  NSTEMI (non-ST elevated myocardial infarction): NSTEMI (non-ST elevated myocardial infarction)

## 2019-04-03 NOTE — PROGRESS NOTE ADULT - PROBLEM SELECTOR PLAN 1
ALDEN (likely ATN from IV contrast). Suspect underlying CKD (unclear baseline Cr).  Avoid nephrotoxins. Monitor labs.  Consider renal consult in AM.  Obtain renal US.  Check FENa

## 2019-04-04 DIAGNOSIS — N17.9 ACUTE KIDNEY FAILURE, UNSPECIFIED: ICD-10-CM

## 2019-04-04 DIAGNOSIS — Z29.9 ENCOUNTER FOR PROPHYLACTIC MEASURES, UNSPECIFIED: ICD-10-CM

## 2019-04-04 LAB
ANION GAP SERPL CALC-SCNC: 13 MMOL/L — SIGNIFICANT CHANGE UP (ref 5–17)
APPEARANCE UR: CLEAR — SIGNIFICANT CHANGE UP
APTT BLD: 29.8 SEC — SIGNIFICANT CHANGE UP (ref 27.5–36.3)
BACTERIA # UR AUTO: NEGATIVE — SIGNIFICANT CHANGE UP
BILIRUB UR-MCNC: NEGATIVE — SIGNIFICANT CHANGE UP
BUN SERPL-MCNC: 44 MG/DL — HIGH (ref 7–23)
CALCIUM SERPL-MCNC: 9.2 MG/DL — SIGNIFICANT CHANGE UP (ref 8.4–10.5)
CHLORIDE SERPL-SCNC: 106 MMOL/L — SIGNIFICANT CHANGE UP (ref 96–108)
CHLORIDE UR-SCNC: <35 MMOL/L — SIGNIFICANT CHANGE UP
CO2 SERPL-SCNC: 22 MMOL/L — SIGNIFICANT CHANGE UP (ref 22–31)
COLOR SPEC: SIGNIFICANT CHANGE UP
CREAT ?TM UR-MCNC: 60 MG/DL — SIGNIFICANT CHANGE UP
CREAT SERPL-MCNC: 3.58 MG/DL — HIGH (ref 0.5–1.3)
DIFF PNL FLD: NEGATIVE — SIGNIFICANT CHANGE UP
EPI CELLS # UR: 1 /HPF — SIGNIFICANT CHANGE UP (ref 0–5)
GLUCOSE BLDC GLUCOMTR-MCNC: 105 MG/DL — HIGH (ref 70–99)
GLUCOSE BLDC GLUCOMTR-MCNC: 107 MG/DL — HIGH (ref 70–99)
GLUCOSE BLDC GLUCOMTR-MCNC: 108 MG/DL — HIGH (ref 70–99)
GLUCOSE BLDC GLUCOMTR-MCNC: 157 MG/DL — HIGH (ref 70–99)
GLUCOSE SERPL-MCNC: 119 MG/DL — HIGH (ref 70–99)
GLUCOSE UR QL: NEGATIVE — SIGNIFICANT CHANGE UP
HCT VFR BLD CALC: 40.2 % — SIGNIFICANT CHANGE UP (ref 39–50)
HGB BLD-MCNC: 12.9 G/DL — LOW (ref 13–17)
HYALINE CASTS # UR AUTO: 0 /LPF — SIGNIFICANT CHANGE UP (ref 0–7)
INR BLD: 0.97 RATIO — SIGNIFICANT CHANGE UP (ref 0.88–1.16)
KETONES UR-MCNC: NEGATIVE — SIGNIFICANT CHANGE UP
LEUKOCYTE ESTERASE UR-ACNC: NEGATIVE — SIGNIFICANT CHANGE UP
MAGNESIUM SERPL-MCNC: 2.2 MG/DL — SIGNIFICANT CHANGE UP (ref 1.6–2.6)
MCHC RBC-ENTMCNC: 32 PG — SIGNIFICANT CHANGE UP (ref 27–34)
MCHC RBC-ENTMCNC: 32.1 GM/DL — SIGNIFICANT CHANGE UP (ref 32–36)
MCV RBC AUTO: 99.8 FL — SIGNIFICANT CHANGE UP (ref 80–100)
NITRITE UR-MCNC: NEGATIVE — SIGNIFICANT CHANGE UP
PH UR: 6.5 — SIGNIFICANT CHANGE UP (ref 5–8)
PHOSPHATE SERPL-MCNC: 3.8 MG/DL — SIGNIFICANT CHANGE UP (ref 2.5–4.5)
PLATELET # BLD AUTO: 164 K/UL — SIGNIFICANT CHANGE UP (ref 150–400)
POTASSIUM SERPL-MCNC: 4.2 MMOL/L — SIGNIFICANT CHANGE UP (ref 3.5–5.3)
POTASSIUM SERPL-SCNC: 4.2 MMOL/L — SIGNIFICANT CHANGE UP (ref 3.5–5.3)
POTASSIUM UR-SCNC: 28 MMOL/L — SIGNIFICANT CHANGE UP
PROT UR-MCNC: ABNORMAL
PROTHROM AB SERPL-ACNC: 11.1 SEC — SIGNIFICANT CHANGE UP (ref 10–13.1)
RBC # BLD: 4.03 M/UL — LOW (ref 4.2–5.8)
RBC # FLD: 13.9 % — SIGNIFICANT CHANGE UP (ref 10.3–14.5)
RBC CASTS # UR COMP ASSIST: 0 /HPF — SIGNIFICANT CHANGE UP (ref 0–4)
SODIUM SERPL-SCNC: 141 MMOL/L — SIGNIFICANT CHANGE UP (ref 135–145)
SODIUM UR-SCNC: 22 MMOL/L — SIGNIFICANT CHANGE UP
SP GR SPEC: 1.01 — SIGNIFICANT CHANGE UP (ref 1.01–1.02)
TSH SERPL-MCNC: 37.1 UIU/ML — HIGH (ref 0.27–4.2)
UROBILINOGEN FLD QL: SIGNIFICANT CHANGE UP
WBC # BLD: 7.89 K/UL — SIGNIFICANT CHANGE UP (ref 3.8–10.5)
WBC # FLD AUTO: 7.89 K/UL — SIGNIFICANT CHANGE UP (ref 3.8–10.5)
WBC UR QL: 1 /HPF — SIGNIFICANT CHANGE UP (ref 0–5)

## 2019-04-04 PROCEDURE — 99223 1ST HOSP IP/OBS HIGH 75: CPT | Mod: GC

## 2019-04-04 PROCEDURE — 99233 SBSQ HOSP IP/OBS HIGH 50: CPT

## 2019-04-04 PROCEDURE — 76775 US EXAM ABDO BACK WALL LIM: CPT | Mod: 26

## 2019-04-04 RX ORDER — BRIMONIDINE TARTRATE 2 MG/MG
1 SOLUTION/ DROPS OPHTHALMIC
Qty: 0 | Refills: 0 | Status: DISCONTINUED | OUTPATIENT
Start: 2019-04-04 | End: 2019-04-08

## 2019-04-04 RX ORDER — METOPROLOL TARTRATE 50 MG
50 TABLET ORAL
Qty: 0 | Refills: 0 | Status: DISCONTINUED | OUTPATIENT
Start: 2019-04-04 | End: 2019-04-05

## 2019-04-04 RX ORDER — LEVOTHYROXINE SODIUM 125 MCG
175 TABLET ORAL DAILY
Qty: 0 | Refills: 0 | Status: DISCONTINUED | OUTPATIENT
Start: 2019-04-04 | End: 2019-04-08

## 2019-04-04 RX ORDER — LATANOPROST 0.05 MG/ML
1 SOLUTION/ DROPS OPHTHALMIC; TOPICAL AT BEDTIME
Qty: 0 | Refills: 0 | Status: DISCONTINUED | OUTPATIENT
Start: 2019-04-04 | End: 2019-04-08

## 2019-04-04 RX ORDER — DORZOLAMIDE HYDROCHLORIDE 20 MG/ML
1 SOLUTION/ DROPS OPHTHALMIC
Qty: 0 | Refills: 0 | Status: DISCONTINUED | OUTPATIENT
Start: 2019-04-04 | End: 2019-04-08

## 2019-04-04 RX ORDER — METFORMIN HYDROCHLORIDE 850 MG/1
1 TABLET ORAL
Qty: 0 | Refills: 0 | COMMUNITY

## 2019-04-04 RX ORDER — ISOSORBIDE MONONITRATE 60 MG/1
1 TABLET, EXTENDED RELEASE ORAL
Qty: 0 | Refills: 0 | COMMUNITY

## 2019-04-04 RX ADMIN — HEPARIN SODIUM 5000 UNIT(S): 5000 INJECTION INTRAVENOUS; SUBCUTANEOUS at 14:27

## 2019-04-04 RX ADMIN — HEPARIN SODIUM 5000 UNIT(S): 5000 INJECTION INTRAVENOUS; SUBCUTANEOUS at 21:55

## 2019-04-04 RX ADMIN — TICAGRELOR 90 MILLIGRAM(S): 90 TABLET ORAL at 18:25

## 2019-04-04 RX ADMIN — Medication 50 MILLIGRAM(S): at 18:26

## 2019-04-04 RX ADMIN — Medication 175 MICROGRAM(S): at 05:48

## 2019-04-04 RX ADMIN — LATANOPROST 1 DROP(S): 0.05 SOLUTION/ DROPS OPHTHALMIC; TOPICAL at 22:04

## 2019-04-04 RX ADMIN — ISOSORBIDE MONONITRATE 90 MILLIGRAM(S): 60 TABLET, EXTENDED RELEASE ORAL at 12:24

## 2019-04-04 RX ADMIN — Medication 75 MILLIGRAM(S): at 14:27

## 2019-04-04 RX ADMIN — Medication 75 MILLIGRAM(S): at 05:48

## 2019-04-04 RX ADMIN — ATORVASTATIN CALCIUM 80 MILLIGRAM(S): 80 TABLET, FILM COATED ORAL at 21:54

## 2019-04-04 RX ADMIN — Medication 81 MILLIGRAM(S): at 12:23

## 2019-04-04 RX ADMIN — CHLORHEXIDINE GLUCONATE 1 APPLICATION(S): 213 SOLUTION TOPICAL at 21:58

## 2019-04-04 RX ADMIN — HEPARIN SODIUM 5000 UNIT(S): 5000 INJECTION INTRAVENOUS; SUBCUTANEOUS at 05:48

## 2019-04-04 RX ADMIN — TICAGRELOR 90 MILLIGRAM(S): 90 TABLET ORAL at 05:48

## 2019-04-04 RX ADMIN — Medication 75 MILLIGRAM(S): at 21:55

## 2019-04-04 RX ADMIN — PANTOPRAZOLE SODIUM 40 MILLIGRAM(S): 20 TABLET, DELAYED RELEASE ORAL at 05:48

## 2019-04-04 RX ADMIN — Medication 25 MILLIGRAM(S): at 05:48

## 2019-04-04 NOTE — CONSULT NOTE ADULT - PROBLEM SELECTOR RECOMMENDATION 9
Patient likely with RAMIRO after cardiac catheterization. Patient noted to have catheterization done on April 1 and there was subsequent rise in creatinine which continues to rise, currently 3.58. In this setting would not recommend staged PCI yet as creatinine is still uptrending and patient is high-risk for further damage. Would continue to monitor creatinine and await plateau or downtrend before proceeding unless there is urgent/emergent indication. Avoid nephrotoxic agents, renally dose all medications, and maintain adequate perfusion pressure. Awaiting results of renal sonogram. Would also send UA, Ulytes, UCreatinine as well. Patient likely with RAMIRO after cardiac catheterization. Patient noted to have catheterization done on April 1 and there was subsequent rise in creatinine which continues to rise, currently 3.58. In this setting would not recommend staged PCI yet as creatinine is still uptrending and patient is high-risk for further damage. Would continue to monitor creatinine and await plateau or downtrend before proceeding unless there is urgent/emergent indication. Avoid nephrotoxic agents, renally dose all medications, and maintain adequate perfusion pressure. Awaiting results of renal sonogram. Urine chloride and urine sodium low may suggest hypovolemic state however would not start fluids just yet and continue to trend creatinine.

## 2019-04-04 NOTE — PROGRESS NOTE ADULT - SUBJECTIVE AND OBJECTIVE BOX
Patient is a 78y old  Male who presents with a chief complaint of Hypertensive urgency, NSTEMI (2019 12:20)        SUBJECTIVE / OVERNIGHT EVENTS:  overnight no acute events.  denies complaints.  denies n/v/f/chills, cp, sob, abd pain.  urinating well.    CAPILLARY BLOOD GLUCOSE  POCT Blood Glucose.: 108 mg/dL (2019 11:51)  POCT Blood Glucose.: 105 mg/dL (2019 07:16)  POCT Blood Glucose.: 122 mg/dL (2019 21:48)  POCT Blood Glucose.: 116 mg/dL (2019 17:40)    I&O's Summary    2019 07:01  -  2019 07:00  --------------------------------------------------------  IN: 480 mL / OUT: 2225 mL / NET: -1745 mL    2019 07:01  -  2019 16:44  --------------------------------------------------------  IN: 600 mL / OUT: 1200 mL / NET: -600 mL    Vital Signs Last 24 Hrs  T(C): 36.9 (2019 12:20), Max: 36.9 (2019 12:20)  T(F): 98.4 (2019 12:20), Max: 98.4 (2019 12:20)  HR: 80 (2019 12:20) (60 - 80)  BP: 161/91 (2019 12:20) (145/72 - 172/92)  BP(mean): --  RR: 18 (2019 12:20) (18 - 20)  SpO2: 96% (2019 12:20) (95% - 97%)    PHYSICAL EXAM:  GENERAL:  Well appearing, bald, obese m, M[place] sp, in NAD  HEAD:  NCAT  EYES: PERRLA, conjunctiva clear  NECK: Supple  CHEST/LUNG: CTA B/L. No w/r/r.  HEART: Reg rate. Normal S1, S2. II/VI systolic murmur.  ABDOMEN: SNTND. Bowel sounds present  EXTREMITIES: chronic venous stasis b/l LE, trace pitting edema  PSYCH: appropriate affect    LABS:                        12.9   7.89  )-----------( 164      ( 2019 09:32 )             40.2     04-04    141  |  106  |  44<H>  ----------------------------<  119<H>  4.2   |  22  |  3.58<H>    Ca    9.2      2019 06:16  Phos  3.8     04-04  Mg     2.2     -04    TPro  6.1  /  Alb  3.0<L>  /  TBili  0.4  /  DBili  x   /  AST  61<H>  /  ALT  19  /  AlkPhos  94  04-03    PT/INR - ( 2019 09:02 )   PT: 11.1 sec;   INR: 0.97 ratio         PTT - ( 2019 09:02 )  PTT:29.8 sec  CARDIAC MARKERS ( 2019 05:22 )  x     / x     / 1104 U/L / x     / 11.9 ng/mL  CARDIAC MARKERS ( 2019 17:25 )  x     / x     / 1144 U/L / x     / 14.8 ng/mL      Urinalysis Basic - ( 2019 06:51 )    Color: Light Yellow / Appearance: Clear / S.011 / pH: x  Gluc: x / Ketone: Negative  / Bili: Negative / Urobili: <2 mg/dL   Blood: x / Protein: 300 mg/dL / Nitrite: Negative   Leuk Esterase: Negative / RBC: 0 /HPF / WBC 1 /HPF   Sq Epi: x / Non Sq Epi: 1 /HPF / Bacteria: Negative      RADIOLOGY & ADDITIONAL TESTS:  Telemetry reviewed: sinus 60-70  Consultant(s) Notes Reviewed:  renal  Care Discussed with Consultants/Other Providers: Floor NP    MEDICATIONS  (STANDING):  aspirin enteric coated 81 milliGRAM(s) Oral daily  atorvastatin 80 milliGRAM(s) Oral at bedtime  brimonidine 0.2% Ophthalmic Solution 1 Drop(s) Both EYES two times a day  chlorhexidine 4% Liquid 1 Application(s) Topical <User Schedule>  dextrose 5%. 1000 milliLiter(s) (50 mL/Hr) IV Continuous <Continuous>  dextrose 50% Injectable 12.5 Gram(s) IV Push once  dextrose 50% Injectable 25 Gram(s) IV Push once  dextrose 50% Injectable 25 Gram(s) IV Push once  dorzolamide 2% Ophthalmic Solution 1 Drop(s) Both EYES <User Schedule>  heparin  Injectable 5000 Unit(s) SubCutaneous every 8 hours  hydrALAZINE 75 milliGRAM(s) Oral every 8 hours  insulin lispro (HumaLOG) corrective regimen sliding scale   SubCutaneous three times a day before meals  insulin lispro (HumaLOG) corrective regimen sliding scale   SubCutaneous at bedtime  isosorbide   mononitrate ER Tablet (IMDUR) 90 milliGRAM(s) Oral daily  latanoprost 0.005% Ophthalmic Solution 1 Drop(s) Both EYES at bedtime  levothyroxine 175 MICROGram(s) Oral daily  metoprolol tartrate 25 milliGRAM(s) Oral two times a day  pantoprazole    Tablet 40 milliGRAM(s) Oral before breakfast  ticagrelor 90 milliGRAM(s) Oral two times a day    MEDICATIONS  (PRN):  dextrose 40% Gel 15 Gram(s) Oral once PRN Blood Glucose LESS THAN 70 milliGRAM(s)/deciLiter  glucagon  Injectable 1 milliGRAM(s) IntraMuscular once PRN Glucose <70 milliGRAM(s)/deciLiter

## 2019-04-04 NOTE — CONSULT NOTE ADULT - ATTENDING COMMENTS
NSTEMI  Hypertensive urgency   Ischemic heart disease s/p cardiac cath with stent to LAD.   ALDEN     ALDEN on likely CKD - due to RAMIRO. Would prefer to wait till RAMIRO resolved before subjecting to another contrast load. Get baseline labs. Check urinalysis with microscopy

## 2019-04-04 NOTE — PROGRESS NOTE ADULT - SUBJECTIVE AND OBJECTIVE BOX
Cardiology Consult Progress Note  Referring: Adriana Florian  Reason for Consult: SOB  ====================  Summary:  78M - German-Speaking - History of Depression, Hypothyroid, HTN, T2DM, CAD s/p PCI x2 () presented with hypertensive emergency and NnSTEMI - s/p PCI pLAD with plan for staged LCx - but course c/b persistent hypertension and Acute on Chronic CKD.   CHIEF COMPLAINT:  HPI:  This is a 78 year od male with history of HTN, DMT2, CAD with stents x2 (), hypothyroid, depression, transferred from [facility] this morning for NSTEMI and hypertensive urgency. Pt noted gradually worsening substernal chest pain with radiation to RUE and took   mg x 2 pills prior to coming in to the hospital.  Pt presented to [facility] where he was found to be hypertensive 200s/120s with ST depressions in V4-6 and trop I 0.344. Pt was given 10mg iv hydral, 20mg lisinopril, 50mg po lopressor, and 20mg iv lasix prior to transfer to [facility] (). Transferred to CCU for persistent chest discomfort and 's now on nitro gtt and hep gtt but otherwise denied headache, vision changes, lightheadedness/dizziness, palpitations, and SOB, pending cardiac cath. (2019 15:03)    INTERVAL HISTORY: Patient seen and examined, denies CP, dyspnea, orthopnea, palpitations and able to lay flat.    REVIEW OF SYSTEMS:    CONSTITUTIONAL: No weakness, fevers or chills  EYES/ENT: No visual changes;  No vertigo or throat pain   NECK: No pain or stiffness  RESPIRATORY: No cough, wheezing, hemoptysis; No shortness of breath  CARDIOVASCULAR: No chest pain or palpitations  GASTROINTESTINAL: No abdominal or epigastric pain. No nausea, vomiting, or hematemesis; No diarrhea or constipation. No melena or hematochezia.  GENITOURINARY: No dysuria, frequency or hematuria  NEUROLOGICAL: No numbness or weakness  SKIN: No itching, rashes      MEDICATIONS  (STANDING):  aspirin enteric coated 81 milliGRAM(s) Oral daily  atorvastatin 80 milliGRAM(s) Oral at bedtime  dextrose 5%. 1000 milliLiter(s) (50 mL/Hr) IV Continuous <Continuous>  dextrose 50% Injectable 12.5 Gram(s) IV Push once  dextrose 50% Injectable 25 Gram(s) IV Push once  dextrose 50% Injectable 25 Gram(s) IV Push once  hydrALAZINE 75 milliGRAM(s) Oral every 8 hours  insulin lispro (HumaLOG) corrective regimen sliding scale   SubCutaneous three times a day before meals  insulin lispro (HumaLOG) corrective regimen sliding scale   SubCutaneous at bedtime  isosorbide   mononitrate ER Tablet (IMDUR) 60 milliGRAM(s) Oral daily  levothyroxine 175 MICROGram(s) Oral daily  metoprolol tartrate 25 milliGRAM(s) Oral two times a day  pantoprazole    Tablet 40 milliGRAM(s) Oral before breakfast  ticagrelor 90 milliGRAM(s) Oral two times a day    MEDICATIONS  (PRN):  dextrose 40% Gel 15 Gram(s) Oral once PRN Blood Glucose LESS THAN 70 milliGRAM(s)/deciLiter  glucagon  Injectable 1 milliGRAM(s) IntraMuscular once PRN Glucose <70 milliGRAM(s)/deciLiter      Objective:  ICU Vital Signs Last 24 Hrs  T(C): 36.9 (2019 04:00), Max: 37.2 (2019 13:45)  T(F): 98.4 (2019 04:00), Max: 99 (2019 13:45)  HR: 70 (2019 07:00) (58 - 92)  BP: 145/78 (2019 07:00) (121/59 - 187/98)  BP(mean): 112 (2019 07:00) (78 - 131)  ABP: --  ABP(mean): --  RR: 17 (2019 07:00) (14 - 26)  SpO2: 95% (2019 07:00) (90% - 99%)      Adult Advanced Hemodynamics Last 24 Hrs      -02 @ 07:01  -  -03 @ 07:00  --------------------------------------------------------  IN: 680 mL / OUT: 2550 mL / NET: -1870 mL      Daily Weight in k.5 (2019 05:00)    PHYSICAL EXAM:  General: WN/WD NAD  Neurology: Awake, nonfocal, DOMINGO x 4  Eyes: Scleras clear, PERRLA/ EOMI, Gross vision intact  ENT:Gross hearing intact, grossly patent pharynx, no stridor  Neck: Neck supple, trachea midline, No JVD,   Respiratory: CTA B/L, No wheezing, rales, rhonchi  CV: RRR, S1S2, no murmurs, rubs or gallops  Abdominal: Soft, NT, ND +BS,   Extremities: No edema, + peripheral pulses  Skin: No Rashes, Hematoma, Ecchymosis  Lymphatic: No Neck, axilla, groin LAD  Psych: Oriented x 3, normal affect  Incisions:         TELEMETRY:     EKG:   < from: 12 Lead ECG (19 @ 07:13) >  Ventricular Rate 75 BPM    Atrial Rate 75 BPM    P-R Interval 154 ms    QRS Duration 104 ms    Q-T Interval 363 ms    QTC Calculation(Bezet) 405 ms    P Axis 52 degrees    R Axis 19 degrees    T Axis -31 degrees    Diagnosis Line NORMAL SINUS RHYTHM  NONSPECIFIC ST AND T WAVE ABNORMALITY  ABNORMAL ECG        IMAGING:  < from: TTE with Doppler (w/Cont) (19 @ 06:37) >  Conclusions: EF 45-50%  1. Mitral valve not well visualized, probably normal.  Minimal mitral regurgitation.  2. Normal trileaflet aortic valve. No aortic valve  regurgitation seen.  3. Mild segmental left ventricular systolic dysfunction.  The mid inferolateral wall, the basal  inferolateral wall,  the basal anterolateral wall, and the mid anterolateral  wall are hypokinetic.  4. Mild diastolic dysfunction (Stage I).  5. Normal right ventricular size and function.  *** No previous Echo exam.      Labs:                          13.3   9.0   )-----------( 155      ( 2019 05:22 )             39.5     04-03    139  |  105  |  38<H>  ----------------------------<  127<H>  4.1   |  22  |  3.30<H>    Ca    9.2      2019 05:22  Phos  2.2     04-03  Mg     2.3     04-03    TPro  6.1  /  Alb  3.0<L>  /  TBili  0.4  /  DBili  x   /  AST  61<H>  /  ALT  19  /  AlkPhos  94  04-    LIVER FUNCTIONS - ( 2019 05:22 )  Alb: 3.0 g/dL / Pro: 6.1 g/dL / ALK PHOS: 94 U/L / ALT: 19 U/L / AST: 61 U/L / GGT: x           PT/INR - ( 2019 05:22 )   PT: 10.8 sec;   INR: 0.94 ratio         PTT - ( 2019 05:22 )  PTT:30.4 sec  CKMB Units: 11.9 ng/mL ( @ 05:22)  CKMB Units: 14.8 ng/mL ( @ 17:25)  Creatine Kinase, Serum: 1144 U/L ( @ 17:25)  Creatine Kinase, Serum: 962 U/L ( @ 12:29)  CKMB Units: 15.7 ng/mL ( @ 12:29)        HEALTH ISSUES - PROBLEM Dx:  Hypothyroid: Hypothyroid  DM (diabetes mellitus): DM (diabetes mellitus)  ALDEN (acute kidney injury): ALDEN (acute kidney injury)  HTN (hypertension): HTN (hypertension)  NSTEMI (non-ST elevated myocardial infarction): NSTEMI (non-ST elevated myocardial infarction)            Assessment and Plan:   · Assessment		  79 y/o PMH  HTN, DMT2, CAD (stents x2 ), hypothyroid, depression, transferred from [facility] for NSTEMI and hypertensive urgency; to CCU on nitro gtt, pending cardiac cath.  Patient is seen and examined with fellow, NP and the CCU house-staff. I agree with the history, physical and the assessment and plan.  NSTEMI s/p PCI to pLAD  -continue dual antiplatelet therapy  -continue statin  -trend cardiac enzymes  - plan for cardiac catheterization for LCx once renal functon is stable  - started on Beta-blocker   - will increase the imdur  - if BP still elevated would change lopressor to coreg.     Problem/Plan - 1:  ·  Problem: NSTEMI (non-ST elevated myocardial infarction).  Plan: S/p PCI MACI to LAD  c/w ASA, Brilinta, Lipitor, Metoprolol  CE troponin down trended.     Problem/Plan - 2:  ·  Problem: HTN (hypertension).  Plan: c/w Hydralazine, Imdur, & Metoprolol.     Problem/Plan - 3:  ·  Problem: ALDEN (acute kidney injury).  Plan: -creat incresased post cath to  3.04 today up from 2.90  -avoid neprhotoxic meds,  -monitor I/O's, BMP  -replete electrolytes PRN.     Problem/Plan - 4:  ·  Problem: DM (diabetes mellitus).  Plan: - HgB A1C = 6.3  - monitor glucose   - c/w HISS.     Problem/Plan - 5:  ·  Problem: Hypothyroid.  Plan: -continue with home dose synthroid  -follow up TSH.     Attending Attestation: Cardiology Consult Progress Note  Referring: Adriana Florian  Reason for Consult: SOB  ====================  Summary:  78M - Welsh-Speaking - History of Depression, Hypothyroid, HTN, T2DM, CAD s/p PCI x2 () presented with hypertensive emergency and NnSTEMI - s/p PCI pLAD with plan for staged LCx - but course c/b persistent hypertension and Acute on Chronic CKD.   ============================  Interval Events  - No reported worsening CP/Palps/SOB  - Creatinine still rising  - BP elevated  - No reported worsening CP/Palps/SOB\  ===========================      REVIEW OF SYSTEMS:    CONSTITUTIONAL: No weakness, fevers or chills  EYES/ENT: No visual changes;  No vertigo or throat pain   NECK: No pain or stiffness  RESPIRATORY: No cough, wheezing, hemoptysis; No shortness of breath  CARDIOVASCULAR: No chest pain or palpitations  GASTROINTESTINAL: No abdominal or epigastric pain. No nausea, vomiting, or hematemesis; No diarrhea or constipation. No melena or hematochezia.  GENITOURINARY: No dysuria, frequency or hematuria  NEUROLOGICAL: No numbness or weakness  SKIN: No itching, rashes      MEDICATIONS  (STANDING):  aspirin enteric coated 81 milliGRAM(s) Oral daily  atorvastatin 80 milliGRAM(s) Oral at bedtime  dextrose 5%. 1000 milliLiter(s) (50 mL/Hr) IV Continuous <Continuous>  dextrose 50% Injectable 12.5 Gram(s) IV Push once  dextrose 50% Injectable 25 Gram(s) IV Push once  dextrose 50% Injectable 25 Gram(s) IV Push once  hydrALAZINE 75 milliGRAM(s) Oral every 8 hours  insulin lispro (HumaLOG) corrective regimen sliding scale   SubCutaneous three times a day before meals  insulin lispro (HumaLOG) corrective regimen sliding scale   SubCutaneous at bedtime  isosorbide   mononitrate ER Tablet (IMDUR) 60 milliGRAM(s) Oral daily  levothyroxine 175 MICROGram(s) Oral daily  metoprolol tartrate 25 milliGRAM(s) Oral two times a day  pantoprazole    Tablet 40 milliGRAM(s) Oral before breakfast  ticagrelor 90 milliGRAM(s) Oral two times a day    MEDICATIONS  (PRN):  dextrose 40% Gel 15 Gram(s) Oral once PRN Blood Glucose LESS THAN 70 milliGRAM(s)/deciLiter  glucagon  Injectable 1 milliGRAM(s) IntraMuscular once PRN Glucose <70 milliGRAM(s)/deciLiter      Objective:  ICU Vital Signs Last 24 Hrs  T(C): 36.9 (2019 04:00), Max: 37.2 (2019 13:45)  T(F): 98.4 (2019 04:00), Max: 99 (2019 13:45)  HR: 70 (2019 07:00) (58 - 92)  BP: 145/78 (2019 07:00) (121/59 - 187/98)  BP(mean): 112 (2019 07:00) (78 - 131)  ABP: --  ABP(mean): --  RR: 17 (2019 07:00) (14 - 26)  SpO2: 95% (2019 07:00) (90% - 99%)      Adult Advanced Hemodynamics Last 24 Hrs      04-02 @ 07:01  -  04-03 @ 07:00  --------------------------------------------------------  IN: 680 mL / OUT: 2550 mL / NET: -1870 mL      Daily Weight in k.5 (2019 05:00)    PHYSICAL EXAM:  General: WN/WD NAD  Neurology: Awake, nonfocal, DOMINGO x 4  Eyes: Scleras clear, PERRLA/ EOMI, Gross vision intact  ENT:Gross hearing intact, grossly patent pharynx, no stridor  Neck: Neck supple, trachea midline, No JVD,   Respiratory: CTA B/L, No wheezing, rales, rhonchi  CV: RRR, S1S2, no murmurs, rubs or gallops  Abdominal: Soft, NT, ND +BS,   Extremities: No edema, + peripheral pulses  Skin: No Rashes, Hematoma, Ecchymosis  Lymphatic: No Neck, axilla, groin LAD  Psych: Oriented x 3, normal affect  Incisions:         TELEMETRY:     EKG:   < from: 12 Lead ECG (19 @ 07:13) >  Ventricular Rate 75 BPM    Atrial Rate 75 BPM    P-R Interval 154 ms    QRS Duration 104 ms    Q-T Interval 363 ms    QTC Calculation(Bezet) 405 ms    P Axis 52 degrees    R Axis 19 degrees    T Axis -31 degrees    Diagnosis Line NORMAL SINUS RHYTHM  NONSPECIFIC ST AND T WAVE ABNORMALITY  ABNORMAL ECG        IMAGING:  < from: TTE with Doppler (w/Cont) (19 @ 06:37) >  Conclusions: EF 45-50%  1. Mitral valve not well visualized, probably normal.  Minimal mitral regurgitation.  2. Normal trileaflet aortic valve. No aortic valve  regurgitation seen.  3. Mild segmental left ventricular systolic dysfunction.  The mid inferolateral wall, the basal  inferolateral wall,  the basal anterolateral wall, and the mid anterolateral  wall are hypokinetic.  4. Mild diastolic dysfunction (Stage I).  5. Normal right ventricular size and function.  *** No previous Echo exam.      Labs: Labs Personally Reviewed 2019                            13.3   9.0   )-----------( 155      ( 2019 05:22 )             39.5     04-03    139  |  105  |  38<H>  ----------------------------<  127<H>  4.1   |  22  |  3.30<H>    Ca    9.2      2019 05:22  Phos  2.2     04-03  Mg     2.3     04-03    TPro  6.1  /  Alb  3.0<L>  /  TBili  0.4  /  DBili  x   /  AST  61<H>  /  ALT  19  /  AlkPhos  94  04-03    LIVER FUNCTIONS - ( 2019 05:22 )  Alb: 3.0 g/dL / Pro: 6.1 g/dL / ALK PHOS: 94 U/L / ALT: 19 U/L / AST: 61 U/L / GGT: x           PT/INR - ( 2019 05:22 )   PT: 10.8 sec;   INR: 0.94 ratio         PTT - ( 2019 05:22 )  PTT:30.4 sec  CKMB Units: 11.9 ng/mL ( @ 05:22)  CKMB Units: 14.8 ng/mL ( @ 17:25)  Creatine Kinase, Serum: 1144 U/L ( @ 17:25)  Creatine Kinase, Serum: 962 U/L ( @ 12:29)  CKMB Units: 15.7 ng/mL ( @ 12:29)        HEALTH ISSUES - PROBLEM Dx:  Hypothyroid: Hypothyroid  DM (diabetes mellitus): DM (diabetes mellitus)  ALDEN (acute kidney injury): ALDEN (acute kidney injury)  HTN (hypertension): HTN (hypertension)  NSTEMI (non-ST elevated myocardial infarction): NSTEMI (non-ST elevated myocardial infarction)            Assessment and Plan:   · Assessment		  78M - Welsh-Speaking - History of Depression, Hypothyroid, HTN, T2DM, CAD s/p PCI x2 () presented with hypertensive emergency and NnSTEMI - s/p PCI pLAD with plan for staged LCx - but course c/b persistent hypertension and Acute on Chronic CKD.     Recs  -DAPT (ASA,Brilinta)  -Atorva  -Coreg 6.125 BID (Change from Lopressor)  -Hydral to 100  -Monitor ALDEN prior to staged procedure

## 2019-04-04 NOTE — CONSULT NOTE ADULT - SUBJECTIVE AND OBJECTIVE BOX
[facility] Division of Kidney Diseases & Hypertension  INITIAL CONSULT NOTE  [phone]--------------------------------------------------------------------------------  HPI: 78 year old male with HTN, HLD, CAD s/p CABG x2 who presents to the hospital with chest pain found to have NSTEMI. Patient initially transferred from         PAST HISTORY  --------------------------------------------------------------------------------  PAST MEDICAL & SURGICAL HISTORY:  Hypothyroid  History of kidney stones  Depression  Glaucoma  DM (diabetes mellitus)  CAD (coronary artery disease)  HLD (hyperlipidemia)  HTN (hypertension)  ASHD (arteriosclerotic heart disease)  Hypothyroid  Depression  Diabetes  HTN (hypertension)  History of open heart surgery: CABG x 2V    FAMILY HISTORY:    PAST SOCIAL HISTORY:    ALLERGIES & MEDICATIONS  --------------------------------------------------------------------------------  Allergies    No Known Allergies    Intolerances      Standing Inpatient Medications  aspirin enteric coated 81 milliGRAM(s) Oral daily  atorvastatin 80 milliGRAM(s) Oral at bedtime  chlorhexidine 4% Liquid 1 Application(s) Topical <User Schedule>  dextrose 5%. 1000 milliLiter(s) IV Continuous <Continuous>  dextrose 50% Injectable 12.5 Gram(s) IV Push once  dextrose 50% Injectable 25 Gram(s) IV Push once  dextrose 50% Injectable 25 Gram(s) IV Push once  heparin  Injectable 5000 Unit(s) SubCutaneous every 8 hours  hydrALAZINE 75 milliGRAM(s) Oral every 8 hours  insulin lispro (HumaLOG) corrective regimen sliding scale   SubCutaneous three times a day before meals  insulin lispro (HumaLOG) corrective regimen sliding scale   SubCutaneous at bedtime  isosorbide   mononitrate ER Tablet (IMDUR) 90 milliGRAM(s) Oral daily  levothyroxine 175 MICROGram(s) Oral daily  metoprolol tartrate 25 milliGRAM(s) Oral two times a day  pantoprazole    Tablet 40 milliGRAM(s) Oral before breakfast  ticagrelor 90 milliGRAM(s) Oral two times a day    PRN Inpatient Medications  dextrose 40% Gel 15 Gram(s) Oral once PRN  glucagon  Injectable 1 milliGRAM(s) IntraMuscular once PRN      REVIEW OF SYSTEMS  --------------------------------------------------------------------------------  Gen: No  fevers/chills, weakness  Skin: No rashes  Head/Eyes/Ears/Mouth: No headache; Normal hearing; Normal vision w/o blurriness;   Respiratory: No dyspnea, cough, wheezing, hemoptysis  CV: No chest pain,   GI: No abdominal pain, diarrhea, constipation, nausea, vomiting  : No increased frequency, dysuria, hematuria, nocturia  MSK: No joint pain/swelling;   Neuro: No dizziness/lightheadedness, weakness, seizures    All other systems were reviewed and are negative, except as noted.    VITALS/PHYSICAL EXAM  --------------------------------------------------------------------------------  T(C): 36.9 (04-04-19 @ 12:20), Max: 37.6 (04-03-19 @ 14:00)  HR: 80 (04-04-19 @ 12:20) (60 - 80)  BP: 161/91 (04-04-19 @ 12:20) (82/56 - 172/92)  RR: 18 (04-04-19 @ 12:20) (18 - 20)  SpO2: 96% (04-04-19 @ 12:20) (95% - 97%)  Wt(kg): --        04-03-19 @ 07:01  -  04-04-19 @ 07:00  --------------------------------------------------------  IN: 480 mL / OUT: 2225 mL / NET: -1745 mL      Physical Exam:  	Gen: NAD, well-appearing  	HEENT: PERRL, supple neck  	Pulm: CTA B/L  	CV:  S1S2  	Abd: +BS, soft   	Ext: No B/L Lower ext edema  	Neuro: No focal deficits  	Skin: Warm, without rashes  	Vascular access:     LABS/STUDIES  --------------------------------------------------------------------------------              12.9   7.89  >-----------<  164      [04-04-19 @ 09:32]              40.2     141  |  106  |  44  ----------------------------<  119      [04-04-19 @ 06:16]  4.2   |  22  |  3.58        Ca     9.2     [04-04-19 @ 06:16]      Mg     2.2     [04-04-19 @ 06:16]      Phos  3.8     [04-04-19 @ 06:16]    TPro  6.1  /  Alb  3.0  /  TBili  0.4  /  DBili  x   /  AST  61  /  ALT  19  /  AlkPhos  94  [04-03-19 @ 05:22]    PT/INR: PT 11.1 , INR 0.97       [04-04-19 @ 09:02]  PTT: 29.8       [04-04-19 @ 09:02]    CK 1104      [04-03-19 @ 05:22]    Creatinine Trend:  SCr 3.58 [04-04 @ 06:16]  SCr 3.30 [04-03 @ 05:22]  SCr 3.16 [04-02 @ 17:25]  SCr 3.02 [04-02 @ 12:29]  SCr 3.04 [04-02 @ 05:05]    Urinalysis - [04-04-19 @ 06:51]      Color Light Yellow / Appearance Clear / SG 1.011 / pH 6.5      Gluc Negative / Ketone Negative  / Bili Negative / Urobili <2 mg/dL       Blood Negative / Protein 300 mg/dL / Leuk Est Negative / Nitrite Negative      RBC 0 / WBC 1 / Hyaline 0 / Gran  / Sq Epi  / Non Sq Epi 1 / Bacteria Negative    Urine Creatinine 60      [04-04-19 @ 00:08]  Urine Sodium 22      [04-04-19 @ 00:08]  Urine Potassium 28      [04-04-19 @ 00:08]  Urine Chloride <35      [04-04-19 @ 00:08]    HbA1c 6.3      [04-01-19 @ 20:29]  TSH 37.10      [04-04-19 @ 09:22]  Lipid: chol 217, , HDL 46,       [04-01-19 @ 19:44] [facility] Division of Kidney Diseases & Hypertension  INITIAL CONSULT NOTE  [phone]--------------------------------------------------------------------------------  HPI: 78 year old Italian-speaking male with HTN, HLD, CAD s/p CABG x2 who presents to the hospital with chest pain found to have NSTEMI. Patient initially transferred from [facility] for hypertensive emergency with NSTEMI. Patient presented to the hospital and was taken to cardiac catheterization and found to have severe LAD disease which was stented and now requires staged PCI. Creatinine baseline unknown however patient presented with creatinine ~3.0 and now noted to be uptrending, now 3.58. Nephrology consulted for ALDEN on CKD?.    Patient seen and examined bedside. Patient states he is not having active chest pain, or shortness of breath, no LE edema. Patient otherwise alert and interactive. States he has no complaints currently.      PAST HISTORY  --------------------------------------------------------------------------------  PAST MEDICAL & SURGICAL HISTORY:  Hypothyroid  History of kidney stones  Depression  Glaucoma  DM (diabetes mellitus)  CAD (coronary artery disease)  HLD (hyperlipidemia)  HTN (hypertension)  ASHD (arteriosclerotic heart disease)  Hypothyroid  Depression  Diabetes  HTN (hypertension)  History of open heart surgery: CABG x 2V    FAMILY HISTORY: No noted family history of cardiac disease.    PAST SOCIAL HISTORY: Smokes cigars.    ALLERGIES & MEDICATIONS  --------------------------------------------------------------------------------  Allergies    No Known Allergies    Intolerances      Standing Inpatient Medications  aspirin enteric coated 81 milliGRAM(s) Oral daily  atorvastatin 80 milliGRAM(s) Oral at bedtime  chlorhexidine 4% Liquid 1 Application(s) Topical <User Schedule>  heparin  Injectable 5000 Unit(s) SubCutaneous every 8 hours  hydrALAZINE 75 milliGRAM(s) Oral every 8 hours  insulin lispro (HumaLOG) corrective regimen sliding scale   SubCutaneous three times a day before meals  insulin lispro (HumaLOG) corrective regimen sliding scale   SubCutaneous at bedtime  isosorbide   mononitrate ER Tablet (IMDUR) 90 milliGRAM(s) Oral daily  levothyroxine 175 MICROGram(s) Oral daily  metoprolol tartrate 25 milliGRAM(s) Oral two times a day  pantoprazole    Tablet 40 milliGRAM(s) Oral before breakfast  ticagrelor 90 milliGRAM(s) Oral two times a day    PRN Inpatient Medications  dextrose 40% Gel 15 Gram(s) Oral once PRN  glucagon  Injectable 1 milliGRAM(s) IntraMuscular once PRN      REVIEW OF SYSTEMS  --------------------------------------------------------------------------------  Gen: No fevers/chills, weakness  Head/Eyes/Ears/Mouth: No headache  Respiratory: No dyspnea, cough, wheezing, hemoptysis  CV: No chest pain  GI: No abdominal pain  : No increased frequency, dysuria, hematuria, nocturia  MSK: No joint pain/swelling   Neuro: No dizziness/lightheadedness    All other systems were reviewed and are negative, except as noted.    VITALS/PHYSICAL EXAM  --------------------------------------------------------------------------------  T(C): 36.9 (04-04-19 @ 12:20), Max: 37.6 (04-03-19 @ 14:00)  HR: 80 (04-04-19 @ 12:20) (60 - 80)  BP: 161/91 (04-04-19 @ 12:20) (82/56 - 172/92)  RR: 18 (04-04-19 @ 12:20) (18 - 20)  SpO2: 96% (04-04-19 @ 12:20) (95% - 97%)  Wt(kg): --        04-03-19 @ 07:01  -  04-04-19 @ 07:00  --------------------------------------------------------  IN: 480 mL / OUT: 2225 mL / NET: -1745 mL      Physical Exam:  	Gen: NAD, well-appearing, obese  	HEENT: Anicteric  	Pulm: Clear lungs  	CV:  S1S2  	Abd: +BS, soft   	Ext: No B/L Lower ext edema  	Neuro: No focal deficits  	Skin: Warm  	Vascular: No cyanosis     LABS/STUDIES  --------------------------------------------------------------------------------              12.9   7.89  >-----------<  164      [04-04-19 @ 09:32]              40.2     141  |  106  |  44  ----------------------------<  119      [04-04-19 @ 06:16]  4.2   |  22  |  3.58        Ca     9.2     [04-04-19 @ 06:16]      Mg     2.2     [04-04-19 @ 06:16]      Phos  3.8     [04-04-19 @ 06:16]    TPro  6.1  /  Alb  3.0  /  TBili  0.4  /  DBili  x   /  AST  61  /  ALT  19  /  AlkPhos  94  [04-03-19 @ 05:22]    PT/INR: PT 11.1 , INR 0.97       [04-04-19 @ 09:02]  PTT: 29.8       [04-04-19 @ 09:02]    CK 1104      [04-03-19 @ 05:22]    Creatinine Trend:  SCr 3.58 [04-04 @ 06:16]  SCr 3.30 [04-03 @ 05:22]  SCr 3.16 [04-02 @ 17:25]  SCr 3.02 [04-02 @ 12:29]  SCr 3.04 [04-02 @ 05:05]    Urinalysis - [04-04-19 @ 06:51]      Color Light Yellow / Appearance Clear / SG 1.011 / pH 6.5      Gluc Negative / Ketone Negative  / Bili Negative / Urobili <2 mg/dL       Blood Negative / Protein 300 mg/dL / Leuk Est Negative / Nitrite Negative      RBC 0 / WBC 1 / Hyaline 0 / Gran  / Sq Epi  / Non Sq Epi 1 / Bacteria Negative    Urine Creatinine 60      [04-04-19 @ 00:08]  Urine Sodium 22      [04-04-19 @ 00:08]  Urine Potassium 28      [04-04-19 @ 00:08]  Urine Chloride <35      [04-04-19 @ 00:08]    HbA1c 6.3      [04-01-19 @ 20:29]  TSH 37.10      [04-04-19 @ 09:22]  Lipid: chol 217, , HDL 46,       [04-01-19 @ 19:44]

## 2019-04-04 NOTE — CONSULT NOTE ADULT - ASSESSMENT
A/P: 78 year old M pt with PMH of HTN, HLD, CAD s/p CABG, and DM2 presented as a transfer from [place] for NSTEMI.     - clinically stable with persistently mild chest discomfort  - EKG showing no ST elevation, c/w medical management for NSTEMI  - pt still hypertensive 180s/120s  - ED will give home PO meds, 25mg po hydral and 60mg imdur  - ED to call back at 7AM regarding BP  - if persistently elevated, start nitro gtt and admit pt to CCU2; if BP controlled, appropriate for telemetry  - day team will discuss further regarding LHC once BP controlled  - please call with further questions    Sammy Salgado, #[id number]  CCU Fellow
78 year old Stateless-speaking male with HTN, HLD, CAD s/p CABG x2 who presents to the hospital with chest pain found to have NSTEMI. Nephrology consulted for ALDEN likely contrast- induced.

## 2019-04-04 NOTE — PROGRESS NOTE ADULT - PROBLEM SELECTOR PLAN 2
4/1 s/p PCI with MACI x 2 to pLAD 100%, EDP 16, LCx 80%.  TTE here ef 45-50% w/ RWMA, stage I dCHF  - Plan for staged PCI once ALDEN improves.  - Continue cardiac meds: aspirin, Brilinta, lipitor, metoprolol  - cardiology to follow

## 2019-04-04 NOTE — PROGRESS NOTE ADULT - PROBLEM SELECTOR PLAN 4
TSH 30s, poor control  - check t4, t3  - c/w home synthroid 175 mcg/day for now, order to be taken before breakfast

## 2019-04-04 NOTE — PHARMACOTHERAPY INTERVENTION NOTE - COMMENTS
Confirmed home medications with patient and pharmacy, updated in Outpatient Medication Review. Communicated with NP.     Prescription for new medication Brilinta sent to pharmacy yesterday. Coverage confirmed, $8.50 copay.    Olamide Kinney, PharmD   [phone]

## 2019-04-04 NOTE — PROGRESS NOTE ADULT - PROBLEM SELECTOR PLAN 1
ALDEN likely RAMIRO. Suspect underlying CKD (unclear baseline Cr).  RENAL U/S w/ med renal disease, no hydro  - Avoid nephrotoxins. monitor I/o's, uop  - renal consulted - appreciate recs - continue to monitor  - home losartan on hold

## 2019-04-04 NOTE — PROGRESS NOTE ADULT - PROBLEM SELECTOR PLAN 3
HTN urgency, bp improved but SBP still 140s-170s  - c/w hydralazine 75 mg TID, imdur 90 mg daily  -  incr metoprolol tartrate 50 mg BID for CAD and BP control  - Monitor vital signs closely.

## 2019-04-05 LAB
ALBUMIN SERPL ELPH-MCNC: 3.2 G/DL — LOW (ref 3.3–5)
ALP SERPL-CCNC: 91 U/L — SIGNIFICANT CHANGE UP (ref 40–120)
ALT FLD-CCNC: 34 U/L — SIGNIFICANT CHANGE UP (ref 10–45)
ANION GAP SERPL CALC-SCNC: 11 MMOL/L — SIGNIFICANT CHANGE UP (ref 5–17)
APPEARANCE UR: CLEAR — SIGNIFICANT CHANGE UP
AST SERPL-CCNC: 55 U/L — HIGH (ref 10–40)
BILIRUB DIRECT SERPL-MCNC: <0.1 MG/DL — SIGNIFICANT CHANGE UP (ref 0–0.2)
BILIRUB INDIRECT FLD-MCNC: >0.2 MG/DL — SIGNIFICANT CHANGE UP (ref 0.2–1)
BILIRUB SERPL-MCNC: 0.3 MG/DL — SIGNIFICANT CHANGE UP (ref 0.2–1.2)
BILIRUB UR-MCNC: NEGATIVE — SIGNIFICANT CHANGE UP
BUN SERPL-MCNC: 44 MG/DL — HIGH (ref 7–23)
CALCIUM SERPL-MCNC: 9.7 MG/DL — SIGNIFICANT CHANGE UP (ref 8.4–10.5)
CHLORIDE SERPL-SCNC: 104 MMOL/L — SIGNIFICANT CHANGE UP (ref 96–108)
CO2 SERPL-SCNC: 23 MMOL/L — SIGNIFICANT CHANGE UP (ref 22–31)
COLOR SPEC: SIGNIFICANT CHANGE UP
CREAT SERPL-MCNC: 3.77 MG/DL — HIGH (ref 0.5–1.3)
DIFF PNL FLD: NEGATIVE — SIGNIFICANT CHANGE UP
GLUCOSE BLDC GLUCOMTR-MCNC: 109 MG/DL — HIGH (ref 70–99)
GLUCOSE BLDC GLUCOMTR-MCNC: 109 MG/DL — HIGH (ref 70–99)
GLUCOSE BLDC GLUCOMTR-MCNC: 110 MG/DL — HIGH (ref 70–99)
GLUCOSE BLDC GLUCOMTR-MCNC: 118 MG/DL — HIGH (ref 70–99)
GLUCOSE BLDC GLUCOMTR-MCNC: 176 MG/DL — HIGH (ref 70–99)
GLUCOSE SERPL-MCNC: 100 MG/DL — HIGH (ref 70–99)
GLUCOSE UR QL: NEGATIVE — SIGNIFICANT CHANGE UP
KETONES UR-MCNC: NEGATIVE — SIGNIFICANT CHANGE UP
LEUKOCYTE ESTERASE UR-ACNC: NEGATIVE — SIGNIFICANT CHANGE UP
NITRITE UR-MCNC: NEGATIVE — SIGNIFICANT CHANGE UP
PH UR: 6.5 — SIGNIFICANT CHANGE UP (ref 5–8)
POTASSIUM SERPL-MCNC: 4.4 MMOL/L — SIGNIFICANT CHANGE UP (ref 3.5–5.3)
POTASSIUM SERPL-SCNC: 4.4 MMOL/L — SIGNIFICANT CHANGE UP (ref 3.5–5.3)
PROT SERPL-MCNC: 6.2 G/DL — SIGNIFICANT CHANGE UP (ref 6–8.3)
PROT UR-MCNC: ABNORMAL
SODIUM SERPL-SCNC: 138 MMOL/L — SIGNIFICANT CHANGE UP (ref 135–145)
SP GR SPEC: 1.01 — LOW (ref 1.01–1.02)
T3 SERPL-MCNC: 88 NG/DL — SIGNIFICANT CHANGE UP (ref 80–200)
T4 AB SER-ACNC: 6.9 UG/DL — SIGNIFICANT CHANGE UP (ref 4.6–12)
UROBILINOGEN FLD QL: SIGNIFICANT CHANGE UP

## 2019-04-05 PROCEDURE — 99233 SBSQ HOSP IP/OBS HIGH 50: CPT

## 2019-04-05 PROCEDURE — 99232 SBSQ HOSP IP/OBS MODERATE 35: CPT | Mod: GC

## 2019-04-05 PROCEDURE — 99232 SBSQ HOSP IP/OBS MODERATE 35: CPT

## 2019-04-05 RX ORDER — HYDRALAZINE HCL 50 MG
100 TABLET ORAL EVERY 8 HOURS
Qty: 0 | Refills: 0 | Status: DISCONTINUED | OUTPATIENT
Start: 2019-04-05 | End: 2019-04-08

## 2019-04-05 RX ORDER — CARVEDILOL PHOSPHATE 80 MG/1
12.5 CAPSULE, EXTENDED RELEASE ORAL EVERY 12 HOURS
Qty: 0 | Refills: 0 | Status: DISCONTINUED | OUTPATIENT
Start: 2019-04-05 | End: 2019-04-08

## 2019-04-05 RX ADMIN — CHLORHEXIDINE GLUCONATE 1 APPLICATION(S): 213 SOLUTION TOPICAL at 21:32

## 2019-04-05 RX ADMIN — PANTOPRAZOLE SODIUM 40 MILLIGRAM(S): 20 TABLET, DELAYED RELEASE ORAL at 05:56

## 2019-04-05 RX ADMIN — ATORVASTATIN CALCIUM 80 MILLIGRAM(S): 80 TABLET, FILM COATED ORAL at 21:06

## 2019-04-05 RX ADMIN — Medication 175 MICROGRAM(S): at 05:57

## 2019-04-05 RX ADMIN — HEPARIN SODIUM 5000 UNIT(S): 5000 INJECTION INTRAVENOUS; SUBCUTANEOUS at 21:06

## 2019-04-05 RX ADMIN — BRIMONIDINE TARTRATE 1 DROP(S): 2 SOLUTION/ DROPS OPHTHALMIC at 05:57

## 2019-04-05 RX ADMIN — Medication 75 MILLIGRAM(S): at 05:57

## 2019-04-05 RX ADMIN — DORZOLAMIDE HYDROCHLORIDE 1 DROP(S): 20 SOLUTION/ DROPS OPHTHALMIC at 05:57

## 2019-04-05 RX ADMIN — TICAGRELOR 90 MILLIGRAM(S): 90 TABLET ORAL at 18:25

## 2019-04-05 RX ADMIN — DORZOLAMIDE HYDROCHLORIDE 1 DROP(S): 20 SOLUTION/ DROPS OPHTHALMIC at 18:25

## 2019-04-05 RX ADMIN — HEPARIN SODIUM 5000 UNIT(S): 5000 INJECTION INTRAVENOUS; SUBCUTANEOUS at 13:48

## 2019-04-05 RX ADMIN — Medication 81 MILLIGRAM(S): at 13:48

## 2019-04-05 RX ADMIN — HEPARIN SODIUM 5000 UNIT(S): 5000 INJECTION INTRAVENOUS; SUBCUTANEOUS at 05:57

## 2019-04-05 RX ADMIN — Medication 100 MILLIGRAM(S): at 21:06

## 2019-04-05 RX ADMIN — Medication 100 MILLIGRAM(S): at 15:31

## 2019-04-05 RX ADMIN — BRIMONIDINE TARTRATE 1 DROP(S): 2 SOLUTION/ DROPS OPHTHALMIC at 18:25

## 2019-04-05 RX ADMIN — TICAGRELOR 90 MILLIGRAM(S): 90 TABLET ORAL at 05:56

## 2019-04-05 RX ADMIN — LATANOPROST 1 DROP(S): 0.05 SOLUTION/ DROPS OPHTHALMIC; TOPICAL at 21:07

## 2019-04-05 RX ADMIN — ISOSORBIDE MONONITRATE 90 MILLIGRAM(S): 60 TABLET, EXTENDED RELEASE ORAL at 13:48

## 2019-04-05 RX ADMIN — CARVEDILOL PHOSPHATE 12.5 MILLIGRAM(S): 80 CAPSULE, EXTENDED RELEASE ORAL at 18:25

## 2019-04-05 RX ADMIN — Medication 50 MILLIGRAM(S): at 06:01

## 2019-04-05 NOTE — PROGRESS NOTE ADULT - PROBLEM SELECTOR PLAN 3
HTN urgency, bp improved but SBP still 140s-170s  - incr hydralazine to 100 mg TID, c/w imdur 90 mg daily  - c/w metoprolol tartrate 50 mg BID  - Monitor vital signs closely

## 2019-04-05 NOTE — PROGRESS NOTE ADULT - PROBLEM SELECTOR PLAN 4
TSH 30s, but t4 and t3 wnl.   - c/w home synthroid 175 mcg/day for now  - synthroid to be taken before breakfast  - repeat TFTs in 4 weeks 5/2019

## 2019-04-05 NOTE — PROGRESS NOTE ADULT - SUBJECTIVE AND OBJECTIVE BOX
Patient is a 78y old  Male who presents with a chief complaint of Hypertensive urgency, NSTEMI (2019 22:31)        SUBJECTIVE / OVERNIGHT EVENTS:  overnight no acute events.  denies complaints. denies n/v/f/chills, cp, sob, abd pain.  ambulating.    CAPILLARY BLOOD GLUCOSE  POCT Blood Glucose.: 118 mg/dL (2019 11:50)  POCT Blood Glucose.: 110 mg/dL (2019 08:10)  POCT Blood Glucose.: 109 mg/dL (2019 08:06)  POCT Blood Glucose.: 157 mg/dL (2019 21:10)  POCT Blood Glucose.: 107 mg/dL (2019 17:43)    I&O's Summary    2019 07:01  -  2019 07:00  --------------------------------------------------------  IN: 900 mL / OUT: 1850 mL / NET: -950 mL    2019 07:01  -  2019 13:40  --------------------------------------------------------  IN: 600 mL / OUT: 500 mL / NET: 100 mL    Vital Signs Last 24 Hrs  T(C): 36.9 (2019 13:18), Max: 37.1 (2019 07:36)  T(F): 98.5 (2019 13:18), Max: 98.8 (2019 07:36)  HR: 66 (2019 13:18) (66 - 71)  BP: 158/66 (2019 13:18) (148/83 - 171/93)  BP(mean): --  RR: 17 (2019 13:18) (16 - 18)  SpO2: 97% (2019 13:18) (96% - 97%)    PHYSICAL EXAM:  GENERAL:  Well appearing, bald, obese m, Mongolian sp, in NAD  HEAD:  NCAT  EYES: PERRLA, conjunctiva clear  NECK: Supple  CHEST/LUNG: CTA B/L. No w/r/r.  HEART: Reg rate. Normal S1, S2. II/VI systolic murmur.  ABDOMEN: SNTND. Bowel sounds present  EXTREMITIES: chronic venous stasis b/l LE, trace pitting edema  PSYCH: appropriate affect    LABS:                        12.9   7.89  )-----------( 164      ( 2019 09:32 )             40.2     04-05    138  |  104  |  44<H>  ----------------------------<  100<H>  4.4   |  23  |  3.77<H>    Ca    9.7      2019 05:41  Phos  3.8     04-04  Mg     2.2     04-04    TPro  6.2  /  Alb  3.2<L>  /  TBili  0.3  /  DBili  <0.1  /  AST  55<H>  /  ALT  34  /  AlkPhos  91  04-05    PT/INR - ( 2019 09:02 )   PT: 11.1 sec;   INR: 0.97 ratio         PTT - ( 2019 09:02 )  PTT:29.8 sec      Urinalysis Basic - ( 2019 09:22 )    Color: Light Yellow / Appearance: Clear / S.009 / pH: x  Gluc: x / Ketone: Negative  / Bili: Negative / Urobili: <2 mg/dL   Blood: x / Protein: 100 mg/dL / Nitrite: Negative   Leuk Esterase: Negative / RBC: 0 /HPF / WBC 0 /HPF   Sq Epi: x / Non Sq Epi: 0 /HPF / Bacteria: Negative    RADIOLOGY & ADDITIONAL TESTS:  Consultant(s) Notes Reviewed:  renal, cardiology  Care Discussed with Consultants/Other Providers: Floor NP    MEDICATIONS  (STANDING):  aspirin enteric coated 81 milliGRAM(s) Oral daily  atorvastatin 80 milliGRAM(s) Oral at bedtime  brimonidine 0.2% Ophthalmic Solution 1 Drop(s) Both EYES two times a day  chlorhexidine 4% Liquid 1 Application(s) Topical <User Schedule>  dextrose 5%. 1000 milliLiter(s) (50 mL/Hr) IV Continuous <Continuous>  dextrose 50% Injectable 12.5 Gram(s) IV Push once  dextrose 50% Injectable 25 Gram(s) IV Push once  dextrose 50% Injectable 25 Gram(s) IV Push once  dorzolamide 2% Ophthalmic Solution 1 Drop(s) Both EYES <User Schedule>  heparin  Injectable 5000 Unit(s) SubCutaneous every 8 hours  hydrALAZINE 75 milliGRAM(s) Oral every 8 hours  insulin lispro (HumaLOG) corrective regimen sliding scale   SubCutaneous three times a day before meals  insulin lispro (HumaLOG) corrective regimen sliding scale   SubCutaneous at bedtime  isosorbide   mononitrate ER Tablet (IMDUR) 90 milliGRAM(s) Oral daily  latanoprost 0.005% Ophthalmic Solution 1 Drop(s) Both EYES at bedtime  levothyroxine 175 MICROGram(s) Oral daily  metoprolol tartrate 50 milliGRAM(s) Oral two times a day  pantoprazole    Tablet 40 milliGRAM(s) Oral before breakfast  ticagrelor 90 milliGRAM(s) Oral two times a day    MEDICATIONS  (PRN):  dextrose 40% Gel 15 Gram(s) Oral once PRN Blood Glucose LESS THAN 70 milliGRAM(s)/deciLiter  glucagon  Injectable 1 milliGRAM(s) IntraMuscular once PRN Glucose <70 milliGRAM(s)/deciLiter

## 2019-04-05 NOTE — PROGRESS NOTE ADULT - SUBJECTIVE AND OBJECTIVE BOX
Cardiology Consult Progress Note  Referring: Adriana Florian  Reason for Consult: SOB  ====================  Summary:  78M - Tajik-Speaking - History of Depression, Hypothyroid, HTN, T2DM, CAD s/p PCI x2 () presented with hypertensive emergency and NnSTEMI - s/p PCI pLAD with plan for staged LCx - but course c/b persistent hypertension and Acute on Chronic CKD.   ============================  Interval Events  - No reported worsening CP/Palps/SOB  - Creatinine still rising  - BP elevated    ===========================      REVIEW OF SYSTEMS:    CONSTITUTIONAL: No weakness, fevers or chills  EYES/ENT: No visual changes;  No vertigo or throat pain   NECK: No pain or stiffness  RESPIRATORY: No cough, wheezing, hemoptysis; No shortness of breath  CARDIOVASCULAR: No chest pain or palpitations  GASTROINTESTINAL: No abdominal or epigastric pain. No nausea, vomiting, or hematemesis; No diarrhea or constipation. No melena or hematochezia.  GENITOURINARY: No dysuria, frequency or hematuria  NEUROLOGICAL: No numbness or weakness  SKIN: No itching, rashes      MEDICATIONS  (STANDING):  aspirin enteric coated 81 milliGRAM(s) Oral daily  atorvastatin 80 milliGRAM(s) Oral at bedtime  dextrose 5%. 1000 milliLiter(s) (50 mL/Hr) IV Continuous <Continuous>  dextrose 50% Injectable 12.5 Gram(s) IV Push once  dextrose 50% Injectable 25 Gram(s) IV Push once  dextrose 50% Injectable 25 Gram(s) IV Push once  hydrALAZINE 75 milliGRAM(s) Oral every 8 hours  insulin lispro (HumaLOG) corrective regimen sliding scale   SubCutaneous three times a day before meals  insulin lispro (HumaLOG) corrective regimen sliding scale   SubCutaneous at bedtime  isosorbide   mononitrate ER Tablet (IMDUR) 60 milliGRAM(s) Oral daily  levothyroxine 175 MICROGram(s) Oral daily  metoprolol tartrate 25 milliGRAM(s) Oral two times a day  pantoprazole    Tablet 40 milliGRAM(s) Oral before breakfast  ticagrelor 90 milliGRAM(s) Oral two times a day    MEDICATIONS  (PRN):  dextrose 40% Gel 15 Gram(s) Oral once PRN Blood Glucose LESS THAN 70 milliGRAM(s)/deciLiter  glucagon  Injectable 1 milliGRAM(s) IntraMuscular once PRN Glucose <70 milliGRAM(s)/deciLiter      Objective:  ICU Vital Signs Last 24 Hrs  T(C): 36.9 (2019 04:00), Max: 37.2 (2019 13:45)  T(F): 98.4 (2019 04:00), Max: 99 (2019 13:45)  HR: 70 (2019 07:00) (58 - 92)  BP: 145/78 (2019 07:00) (121/59 - 187/98)  BP(mean): 112 (2019 07:00) (78 - 131)  ABP: --  ABP(mean): --  RR: 17 (2019 07:00) (14 - 26)  SpO2: 95% (2019 07:00) (90% - 99%)      Adult Advanced Hemodynamics Last 24 Hrs      -02 @ 07:01  -  04-03 @ 07:00  --------------------------------------------------------  IN: 680 mL / OUT: 2550 mL / NET: -1870 mL      Daily Weight in k.5 (2019 05:00)    PHYSICAL EXAM:  General: WN/WD NAD  Neurology: Awake, nonfocal, DOMINGO x 4  Eyes: Scleras clear, PERRLA/ EOMI, Gross vision intact  ENT:Gross hearing intact, grossly patent pharynx, no stridor  Neck: Neck supple, trachea midline, No JVD,   Respiratory: CTA B/L, No wheezing, rales, rhonchi  CV: RRR, S1S2, no murmurs, rubs or gallops  Abdominal: Soft, NT, ND +BS,   Extremities: No edema, + peripheral pulses  Skin: No Rashes, Hematoma, Ecchymosis  Lymphatic: No Neck, axilla, groin LAD  Psych: Oriented x 3, normal affect  Incisions:         TELEMETRY:     EKG:   < from: 12 Lead ECG (19 @ 07:13) >  Ventricular Rate 75 BPM    Atrial Rate 75 BPM    P-R Interval 154 ms    QRS Duration 104 ms    Q-T Interval 363 ms    QTC Calculation(Bezet) 405 ms    P Axis 52 degrees    R Axis 19 degrees    T Axis -31 degrees    Diagnosis Line NORMAL SINUS RHYTHM  NONSPECIFIC ST AND T WAVE ABNORMALITY  ABNORMAL ECG        IMAGING:  < from: TTE with Doppler (w/Cont) (19 @ 06:37) >  Conclusions: EF 45-50%  1. Mitral valve not well visualized, probably normal.  Minimal mitral regurgitation.  2. Normal trileaflet aortic valve. No aortic valve  regurgitation seen.  3. Mild segmental left ventricular systolic dysfunction.  The mid inferolateral wall, the basal  inferolateral wall,  the basal anterolateral wall, and the mid anterolateral  wall are hypokinetic.  4. Mild diastolic dysfunction (Stage I).  5. Normal right ventricular size and function.  *** No previous Echo exam.      Labs: Labs Personally Reviewed 2019                            13.3   9.0   )-----------( 155      ( 2019 05:22 )             39.5     04-03    139  |  105  |  38<H>  ----------------------------<  127<H>  4.1   |  22  |  3.30<H>    Ca    9.2      2019 05:22  Phos  2.2     04-03  Mg     2.3     04-03    TPro  6.1  /  Alb  3.0<L>  /  TBili  0.4  /  DBili  x   /  AST  61<H>  /  ALT  19  /  AlkPhos  94  04-03    LIVER FUNCTIONS - ( 2019 05:22 )  Alb: 3.0 g/dL / Pro: 6.1 g/dL / ALK PHOS: 94 U/L / ALT: 19 U/L / AST: 61 U/L / GGT: x           PT/INR - ( 2019 05:22 )   PT: 10.8 sec;   INR: 0.94 ratio         PTT - ( 2019 05:22 )  PTT:30.4 sec  CKMB Units: 11.9 ng/mL ( @ 05:22)  CKMB Units: 14.8 ng/mL ( @ 17:25)  Creatine Kinase, Serum: 1144 U/L ( @ 17:25)  Creatine Kinase, Serum: 962 U/L ( @ 12:29)  CKMB Units: 15.7 ng/mL ( @ 12:29)        HEALTH ISSUES - PROBLEM Dx:  Hypothyroid: Hypothyroid  DM (diabetes mellitus): DM (diabetes mellitus)  ALDEN (acute kidney injury): ALDEN (acute kidney injury)  HTN (hypertension): HTN (hypertension)  NSTEMI (non-ST elevated myocardial infarction): NSTEMI (non-ST elevated myocardial infarction)            Assessment and Plan:   · Assessment		  78M - Tajik-Speaking - History of Depression, Hypothyroid, HTN, T2DM, CAD s/p PCI x2 () presented with hypertensive emergency and NnSTEMI - s/p PCI pLAD with plan for staged LCx - but course c/b persistent hypertension and Acute on Chronic CKD.     Recs  -DAPT (ASA,Brilinta)  -Atorva 80  -Coreg 12.5 BID (Changed) from Lopressor  -Hydral 100  -Imdur 80  -Monitor ALDEN prior to staged procedure

## 2019-04-05 NOTE — PROGRESS NOTE ADULT - PROBLEM SELECTOR PLAN 5
HgA1c 6.3. on metformin and glipizide at home  - Continue HISS for now.  - Monitor FS qAC, qHS.  - Hypoglycemia protocol.  - Will need close follow up with Dr. Zheng Joya (PMD) [phone]

## 2019-04-05 NOTE — PROGRESS NOTE ADULT - PROBLEM SELECTOR PLAN 1
admitted with a creat of 3.0 - unsure if he has baseline ckd. had lloyd sec to contrast nephropathy and creat is still worsening. hold off on staged PCI for now. urinalysis shows protein- 100. needs renal usg, urine protein/creat ratio, renal and bladder USG.

## 2019-04-05 NOTE — PROGRESS NOTE ADULT - PROBLEM SELECTOR PLAN 2
4/1 s/p PCI with MACI x 2 to pLAD 100%. still w/ LCx disease 80%, pending staged PCI  TTE here ef 45-50% w/ RWMA, stage I dCHF  - Plan for staged PCI once ALDEN improves.  - Continue cardiac meds: aspirin, Brilinta, lipitor, metoprolol  - cardiology to follow

## 2019-04-05 NOTE — PROGRESS NOTE ADULT - SUBJECTIVE AND OBJECTIVE BOX
[facility] Division of Kidney Diseases & Hypertension  FOLLOW UP NOTE  --------------------------------------------------------------------------------  Chief Complaint:    24 hour events/subjective:    no new events         PAST HISTORY  --------------------------------------------------------------------------------  No significant changes to PMH, PSH, FHx, SHx, unless otherwise noted    ALLERGIES & MEDICATIONS  --------------------------------------------------------------------------------  Allergies    No Known Allergies    Intolerances      Standing Inpatient Medications  aspirin enteric coated 81 milliGRAM(s) Oral daily  atorvastatin 80 milliGRAM(s) Oral at bedtime  brimonidine 0.2% Ophthalmic Solution 1 Drop(s) Both EYES two times a day  carvedilol 12.5 milliGRAM(s) Oral every 12 hours  chlorhexidine 4% Liquid 1 Application(s) Topical <User Schedule>  dextrose 5%. 1000 milliLiter(s) IV Continuous <Continuous>  dextrose 50% Injectable 12.5 Gram(s) IV Push once  dextrose 50% Injectable 25 Gram(s) IV Push once  dextrose 50% Injectable 25 Gram(s) IV Push once  dorzolamide 2% Ophthalmic Solution 1 Drop(s) Both EYES <User Schedule>  heparin  Injectable 5000 Unit(s) SubCutaneous every 8 hours  hydrALAZINE 100 milliGRAM(s) Oral every 8 hours  insulin lispro (HumaLOG) corrective regimen sliding scale   SubCutaneous three times a day before meals  insulin lispro (HumaLOG) corrective regimen sliding scale   SubCutaneous at bedtime  isosorbide   mononitrate ER Tablet (IMDUR) 90 milliGRAM(s) Oral daily  latanoprost 0.005% Ophthalmic Solution 1 Drop(s) Both EYES at bedtime  levothyroxine 175 MICROGram(s) Oral daily  pantoprazole    Tablet 40 milliGRAM(s) Oral before breakfast  ticagrelor 90 milliGRAM(s) Oral two times a day    PRN Inpatient Medications  dextrose 40% Gel 15 Gram(s) Oral once PRN  glucagon  Injectable 1 milliGRAM(s) IntraMuscular once PRN    VITALS/PHYSICAL EXAM  --------------------------------------------------------------------------------  T(C): 36.9 (04-05-19 @ 13:18), Max: 37.1 (04-05-19 @ 07:36)  HR: 66 (04-05-19 @ 13:18) (66 - 71)  BP: 158/66 (04-05-19 @ 13:18) (148/83 - 171/93)  RR: 17 (04-05-19 @ 13:18) (16 - 18)  SpO2: 97% (04-05-19 @ 13:18) (96% - 97%)  Wt(kg): --        04-04-19 @ 07:01  -  04-05-19 @ 07:00  --------------------------------------------------------  IN: 900 mL / OUT: 1850 mL / NET: -950 mL    04-05-19 @ 07:01  -  04-05-19 @ 16:53  --------------------------------------------------------  IN: 600 mL / OUT: 500 mL / NET: 100 mL      Physical Exam:  	Gen: NAD, well-appearing  	HEENT: PERRL, supple neck, clear oropharynx  	Pulm: CTA B/L  	CV: RRR, S1S2; no rub  	Back: No spinal or CVA tenderness; no sacral edema  	Abd: +BS, soft, nontender/nondistended  	: No suprapubic tenderness  	UE: Warm, FROM, no clubbing, intact strength; no edema; no asterixis  	LE: Warm, FROM, no clubbing, intact strength; no edema  	Neuro: No focal deficits, intact gait  	Psych: Normal affect and mood  	Skin: Warm, without rashes  	  LABS/STUDIES  --------------------------------------------------------------------------------              12.9   7.89  >-----------<  164      [04-04-19 @ 09:32]              40.2     138  |  104  |  44  ----------------------------<  100      [04-05-19 @ 05:41]  4.4   |  23  |  3.77        Ca     9.7     [04-05-19 @ 05:41]      Mg     2.2     [04-04-19 @ 06:16]      Phos  3.8     [04-04-19 @ 06:16]    TPro  6.2  /  Alb  3.2  /  TBili  0.3  /  DBili  <0.1  /  AST  55  /  ALT  34  /  AlkPhos  91  [04-05-19 @ 05:41]    PT/INR: PT 11.1 , INR 0.97       [04-04-19 @ 09:02]  PTT: 29.8       [04-04-19 @ 09:02]      Creatinine Trend:  SCr 3.77 [04-05 @ 05:41]  SCr 3.58 [04-04 @ 06:16]  SCr 3.30 [04-03 @ 05:22]  SCr 3.16 [04-02 @ 17:25]  SCr 3.02 [04-02 @ 12:29]    Urinalysis - [04-05-19 @ 09:22]      Color Light Yellow / Appearance Clear / SG 1.009 / pH 6.5      Gluc Negative / Ketone Negative  / Bili Negative / Urobili <2 mg/dL       Blood Negative / Protein 100 mg/dL / Leuk Est Negative / Nitrite Negative      RBC 0 / WBC 0 / Hyaline 0 / Gran  / Sq Epi  / Non Sq Epi 0 / Bacteria Negative    Urine Creatinine 60      [04-04-19 @ 00:08]  Urine Sodium 22      [04-04-19 @ 00:08]  Urine Potassium 28      [04-04-19 @ 00:08]  Urine Chloride <35      [04-04-19 @ 00:08]    HbA1c 6.3      [04-01-19 @ 20:29]  TSH 37.10      [04-04-19 @ 09:22]  Lipid: chol 217, , HDL 46,       [04-01-19 @ 19:44]

## 2019-04-06 LAB
ANION GAP SERPL CALC-SCNC: 10 MMOL/L — SIGNIFICANT CHANGE UP (ref 5–17)
BUN SERPL-MCNC: 52 MG/DL — HIGH (ref 7–23)
CALCIUM SERPL-MCNC: 9.5 MG/DL — SIGNIFICANT CHANGE UP (ref 8.4–10.5)
CHLORIDE SERPL-SCNC: 103 MMOL/L — SIGNIFICANT CHANGE UP (ref 96–108)
CO2 SERPL-SCNC: 22 MMOL/L — SIGNIFICANT CHANGE UP (ref 22–31)
CREAT SERPL-MCNC: 3.69 MG/DL — HIGH (ref 0.5–1.3)
GLUCOSE BLDC GLUCOMTR-MCNC: 130 MG/DL — HIGH (ref 70–99)
GLUCOSE BLDC GLUCOMTR-MCNC: 131 MG/DL — HIGH (ref 70–99)
GLUCOSE BLDC GLUCOMTR-MCNC: 140 MG/DL — HIGH (ref 70–99)
GLUCOSE BLDC GLUCOMTR-MCNC: 141 MG/DL — HIGH (ref 70–99)
GLUCOSE SERPL-MCNC: 123 MG/DL — HIGH (ref 70–99)
HCT VFR BLD CALC: 36.1 % — LOW (ref 39–50)
HGB BLD-MCNC: 11.8 G/DL — LOW (ref 13–17)
MAGNESIUM SERPL-MCNC: 2.2 MG/DL — SIGNIFICANT CHANGE UP (ref 1.6–2.6)
MCHC RBC-ENTMCNC: 32.5 PG — SIGNIFICANT CHANGE UP (ref 27–34)
MCHC RBC-ENTMCNC: 32.7 GM/DL — SIGNIFICANT CHANGE UP (ref 32–36)
MCV RBC AUTO: 99.4 FL — SIGNIFICANT CHANGE UP (ref 80–100)
PHOSPHATE SERPL-MCNC: 3.5 MG/DL — SIGNIFICANT CHANGE UP (ref 2.5–4.5)
PLATELET # BLD AUTO: 175 K/UL — SIGNIFICANT CHANGE UP (ref 150–400)
POTASSIUM SERPL-MCNC: 4.2 MMOL/L — SIGNIFICANT CHANGE UP (ref 3.5–5.3)
POTASSIUM SERPL-SCNC: 4.2 MMOL/L — SIGNIFICANT CHANGE UP (ref 3.5–5.3)
RBC # BLD: 3.63 M/UL — LOW (ref 4.2–5.8)
RBC # FLD: 14 % — SIGNIFICANT CHANGE UP (ref 10.3–14.5)
SODIUM SERPL-SCNC: 135 MMOL/L — SIGNIFICANT CHANGE UP (ref 135–145)
WBC # BLD: 6.75 K/UL — SIGNIFICANT CHANGE UP (ref 3.8–10.5)
WBC # FLD AUTO: 6.75 K/UL — SIGNIFICANT CHANGE UP (ref 3.8–10.5)

## 2019-04-06 PROCEDURE — 99233 SBSQ HOSP IP/OBS HIGH 50: CPT

## 2019-04-06 RX ADMIN — CARVEDILOL PHOSPHATE 12.5 MILLIGRAM(S): 80 CAPSULE, EXTENDED RELEASE ORAL at 06:08

## 2019-04-06 RX ADMIN — Medication 175 MICROGRAM(S): at 06:08

## 2019-04-06 RX ADMIN — LATANOPROST 1 DROP(S): 0.05 SOLUTION/ DROPS OPHTHALMIC; TOPICAL at 21:27

## 2019-04-06 RX ADMIN — ATORVASTATIN CALCIUM 80 MILLIGRAM(S): 80 TABLET, FILM COATED ORAL at 21:27

## 2019-04-06 RX ADMIN — Medication 100 MILLIGRAM(S): at 13:26

## 2019-04-06 RX ADMIN — Medication 100 MILLIGRAM(S): at 06:08

## 2019-04-06 RX ADMIN — BRIMONIDINE TARTRATE 1 DROP(S): 2 SOLUTION/ DROPS OPHTHALMIC at 17:47

## 2019-04-06 RX ADMIN — CHLORHEXIDINE GLUCONATE 1 APPLICATION(S): 213 SOLUTION TOPICAL at 21:24

## 2019-04-06 RX ADMIN — HEPARIN SODIUM 5000 UNIT(S): 5000 INJECTION INTRAVENOUS; SUBCUTANEOUS at 13:26

## 2019-04-06 RX ADMIN — TICAGRELOR 90 MILLIGRAM(S): 90 TABLET ORAL at 06:08

## 2019-04-06 RX ADMIN — DORZOLAMIDE HYDROCHLORIDE 1 DROP(S): 20 SOLUTION/ DROPS OPHTHALMIC at 06:08

## 2019-04-06 RX ADMIN — ISOSORBIDE MONONITRATE 90 MILLIGRAM(S): 60 TABLET, EXTENDED RELEASE ORAL at 13:26

## 2019-04-06 RX ADMIN — CARVEDILOL PHOSPHATE 12.5 MILLIGRAM(S): 80 CAPSULE, EXTENDED RELEASE ORAL at 17:47

## 2019-04-06 RX ADMIN — TICAGRELOR 90 MILLIGRAM(S): 90 TABLET ORAL at 17:46

## 2019-04-06 RX ADMIN — BRIMONIDINE TARTRATE 1 DROP(S): 2 SOLUTION/ DROPS OPHTHALMIC at 06:09

## 2019-04-06 RX ADMIN — Medication 100 MILLIGRAM(S): at 21:27

## 2019-04-06 RX ADMIN — HEPARIN SODIUM 5000 UNIT(S): 5000 INJECTION INTRAVENOUS; SUBCUTANEOUS at 06:08

## 2019-04-06 RX ADMIN — PANTOPRAZOLE SODIUM 40 MILLIGRAM(S): 20 TABLET, DELAYED RELEASE ORAL at 06:08

## 2019-04-06 RX ADMIN — DORZOLAMIDE HYDROCHLORIDE 1 DROP(S): 20 SOLUTION/ DROPS OPHTHALMIC at 17:47

## 2019-04-06 RX ADMIN — Medication 81 MILLIGRAM(S): at 13:30

## 2019-04-06 RX ADMIN — HEPARIN SODIUM 5000 UNIT(S): 5000 INJECTION INTRAVENOUS; SUBCUTANEOUS at 21:27

## 2019-04-06 NOTE — PROGRESS NOTE ADULT - PROBLEM SELECTOR PLAN 4
TSH 30s, but t4 and t3 wnl.   - c/w home synthroid 175 mcg/day for now  - synthroid to be taken before breakfast  - repeat TFTs in 4 weeks 5/2019 - TSH 30s, but t4 and t3 wnl.   - c/w home synthroid 175 mcg/day for now  - synthroid to be taken before breakfast  - repeat TFTs in 4 weeks 5/2019

## 2019-04-06 NOTE — PROGRESS NOTE ADULT - PROBLEM SELECTOR PROBLEM 2
HTN (hypertension)
NSTEMI (non-ST elevated myocardial infarction)
HTN (hypertension)

## 2019-04-06 NOTE — PROGRESS NOTE ADULT - PROBLEM SELECTOR PLAN 6
dvt ppx: on hsq  no pt/ot needs  dispo: pending ALDEN improvement and eventual staged PCI - dvt ppx: on hsq  - no pt/ot needs  - dispo: pending ALDEN improvement and eventual staged PCI

## 2019-04-06 NOTE — PROGRESS NOTE ADULT - SUBJECTIVE AND OBJECTIVE BOX
SUBJECTIVE / OVERNIGHT EVENTS:  Very comfortable. Denies any HA, CP, SOB. Patient is sitting up very comfortably. Labs and vitals reviewed.  used.    PHYSICAL EXAM:  T(C): 36.7 (2019 13:24), Max: 36.9 (2019 21:25)  T(F): 98.1 (2019 13:24), Max: 98.5 (2019 21:25)  HR: 62 (2019 13:24) (60 - 71)  BP: 158/75 (2019 13:24) (133/70 - 159/86)  RR: 16 (2019 13:24) (16 - 18)  SpO2: 98% (2019 13:24) (96% - 98%)  GENERAL:  Well appearing, bald, obese m, Lithuanian sp, in NAD  HEAD:  NCAT  EYES: PERRLA, conjunctiva clear  NECK: Supple  CHEST/LUNG: CTA B/L. No w/r/r.  HEART: Reg rate. Normal S1, S2. II/VI systolic murmur.  ABDOMEN: SNTND. Bowel sounds present  EXTREMITIES: chronic venous stasis b/l LE, trace pitting edema  PSYCH: appropriate affect    LABS:    CBC Full  -  ( 2019 10:09 )  WBC Count : 6.75 K/uL  RBC Count : 3.63 M/uL  Hemoglobin : 11.8 g/dL  Hematocrit : 36.1 %  Platelet Count - Automated : 175 K/uL    Urinalysis Basic - ( 2019 09:22 )    Color: Light Yellow / Appearance: Clear / S.009 / pH: x  Gluc: x / Ketone: Negative  / Bili: Negative / Urobili: <2 mg/dL   Blood: x / Protein: 100 mg/dL / Nitrite: Negative   Leuk Esterase: Negative / RBC: 0 /HPF / WBC 0 /HPF   Sq Epi: x / Non Sq Epi: 0 /HPF / Bacteria: Negative          135  |  103  |  52<H>  ----------------------------<  123<H>  4.2   |  22  |  3.69<H>    Ca    9.5      2019 06:10  Phos  3.5     04-06  Mg     2.2     04-06    TPro  6.2  /  Alb  3.2<L>  /  TBili  0.3  /  DBili  <0.1  /  AST  55<H>  /  ALT  34  /  AlkPhos  91  04-05 SUBJECTIVE / OVERNIGHT EVENTS:   used. Doing well. Denies any HA, CP, SOB. Ambulating without any difficulties. Complaints of sinuses.     REVIEW OF SYSTEMS:  General: NAD, hemodynamically stable,   HEENT:  Eyes:  No visual loss, blurred vision, double vision or yellow sclerae. Ears, Nose, Throat:  No hearing loss, sneezing, congestion, runny nose or sore throat.  SKIN:  No rash or itching.  CARDIOVASCULAR:  No chest pain, chest pressure or chest discomfort. No palpitations or edema.  RESPIRATORY:  No shortness of breath, cough or sputum.  GASTROINTESTINAL:  No anorexia, nausea, vomiting or diarrhea. No abdominal pain or blood.  NEUROLOGICAL:  No headache, dizziness, syncope, paralysis, ataxia, numbness or tingling in the extremities. No change in bowel or bladder control.  MUSCULOSKELETAL:  No muscle, back pain, joint pain or stiffness.  HEMATOLOGIC:  No anemia, bleeding or bruising.  LYMPHATICS:  No enlarged nodes. No history of splenectomy.  ENDOCRINOLOGIC:  No reports of sweating, cold or heat intolerance. No polyuria or polydipsia.  ALLERGIES:  No history of asthma, hives, eczema or rhinitis.    PHYSICAL EXAM:  ICU Vital Signs Last 24 Hrs  T(C): 36.6 (07 Apr 2019 13:39), Max: 36.9 (07 Apr 2019 05:22)  T(F): 97.9 (07 Apr 2019 13:39), Max: 98.5 (07 Apr 2019 05:22)  HR: 69 (07 Apr 2019 13:39) (64 - 72)  BP: 182/78 (07 Apr 2019 13:39) (125/69 - 182/78)  RR: 16 (07 Apr 2019 13:39) (16 - 18)  SpO2: 96% (07 Apr 2019 13:39) (96% - 97%)    GENERAL:  Well appearing, bald, obese m, Hungarian sp, in NAD  HEAD:  NCAT  EYES: PERRLA, conjunctiva clear  NECK: Supple  CHEST/LUNG: CTA B/L. No w/r/r.  HEART: Reg rate. Normal S1, S2. II/VI systolic murmur.  ABDOMEN: SNTND. Bowel sounds present  EXTREMITIES: chronic venous stasis b/l LE, trace pitting edema  PSYCH: appropriate affect    LABS:      CBC Full  -  ( 06 Apr 2019 10:09 )  WBC Count : 6.75 K/uL  RBC Count : 3.63 M/uL  Hemoglobin : 11.8 g/dL  Hematocrit : 36.1 %  Platelet Count - Automated : 175 K/uL    138  |  104  |  54<H>  ----------------------------<  155<H>  4.1   |  20<L>  |  3.36<H>    Ca    9.1      07 Apr 2019 06:12  Phos  3.5     04-06  Mg     2.2     04-06

## 2019-04-06 NOTE — PROGRESS NOTE ADULT - PROBLEM SELECTOR PLAN 3
- HTN urgency, bp improved but SBP still 140s-170s  - incr hydralazine to 100 mg TID, c/w imdur 90 mg daily  - c/w metoprolol tartrate 50 mg BID  - Monitor vital signs closely - HTN urgency, bp improved but SBP still 140s-170s  - Hydralazine to 100 mg TID  - Imdur 90 mg daily  - Metoprolol tartrate 50 mg BID  - Monitor vital signs closely

## 2019-04-06 NOTE — PROGRESS NOTE ADULT - PROBLEM SELECTOR PROBLEM 3
ALDEN (acute kidney injury)
Hypertensive crisis
ALDEN (acute kidney injury)

## 2019-04-06 NOTE — PROGRESS NOTE ADULT - PROBLEM SELECTOR PLAN 5
HgA1c 6.3. on metformin and glipizide at home  - Continue HISS for now.  - Monitor FS qAC, qHS.  - Hypoglycemia protocol.  - Will need close follow up with Dr. Zheng Joya (PMD) [phone] - HgA1c 6.3. on metformin and glipizide at home  - Continue HISS for now.  - Monitor FS qAC, qHS.  - Hypoglycemia protocol.  - Will need close follow up with Dr. Zheng Joya (PMD) [phone]

## 2019-04-06 NOTE — PROGRESS NOTE ADULT - ASSESSMENT
77 yo Kyrgyz speaking M PMH HTN, DMT2, CAD with stents x2 (2000), hypothyroid, depression, transferred from [facility] for NSTEMI and hypertensive urgency s/p CCU stay requiring on nitro gtt now s/p PCI with MACI x 2 to pLAD 4/1, c/b ALDEN/RAMIRO pending staged PCI to LCx when Cr improves.
77 yo M PMH HTN, DMT2, CAD with stents x2 (2000), hypothyroid, depression, transferred from [facility] for NSTEMI and hypertensive urgency s/p CCU stay requiring on nitro gtt now s/p PCI with MACI x 2 to pLAD & LCx on 4/1, c/b ALDEN/RAMIRO pending staged PCI when Cr improves.
77 yo Omani speaking M PMH HTN, DMT2, CAD with stents x2 (2000), hypothyroid, depression, transferred from [facility] for NSTEMI and hypertensive urgency s/p CCU stay requiring on nitro gtt now s/p PCI with MACI x 2 to pLAD 4/1, c/b ALDEN/RAMIRO pending staged PCI to LCx when Cr improves.
79 y/o PMH  HTN, DMT2, CAD (stents x2 2000), hypothyroid, depression, transferred from [facility] for NSTEMI and hypertensive urgency; to CCU on nitro gtt, pending cardiac cath.
79yo Man with history of HTN, DMT2, CAD with stents x2 (2000), hypothyroid, depression, transferred from [facility] for NSTEMI and hypertensive urgency. S/p CCU stay requiring on nitro gtt. 4/1 s/p PCI with MACI x 2 to pLAD 100%, EDP 16, LCx 80%. Complicated by ALDEN (likely ATN from IV contrast) for possible stage PCI when Cr. improves.
77 y/o PMH  HTN, DMT2, CAD (stents x2 2000), hypothyroid, depression, transferred from [facility] for NSTEMI and hypertensive urgency; to CCU on nitro gtt, pending cardiac cath.

## 2019-04-06 NOTE — PROGRESS NOTE ADULT - PROBLEM SELECTOR PLAN 1
- ALDEN likely RAMIRO. Suspect underlying CKD (unclear baseline Cr).  - RENAL U/S w/ med renal disease, no hydro  - Avoid nephrotoxins. monitor I/o's, uop  - renal consulted - appreciate recs. - continue to monitor  - home losartan on hold - ALDEN likely RAMIRO. Suspect underlying CKD (unclear baseline Cr).  - RENAL U/S w/ med renal disease, no hydro  - Avoid nephrotoxins. monitor I/o's, uop  - renal consulted - appreciate recs - continue to monitor  - home losartan on hold  - Scr -  3.36

## 2019-04-06 NOTE — PROGRESS NOTE ADULT - PROBLEM SELECTOR PROBLEM 4
DM (diabetes mellitus)
DM (diabetes mellitus)
Hypothyroidism, unspecified type

## 2019-04-06 NOTE — PROGRESS NOTE ADULT - PROBLEM SELECTOR PROBLEM 5
Hypothyroid
Hypothyroid
Type 2 diabetes mellitus without complication, without long-term current use of insulin

## 2019-04-06 NOTE — PROGRESS NOTE ADULT - PROBLEM SELECTOR PLAN 2
- 4/1 s/p PCI with MACI x 2 to pLAD 100%. still w/ LCx disease 80%, pending staged PCI  - TTE here ef 45-50% w/ RWMA, stage I dCHF  - Plan for staged PCI once ALDEN improves.  - Continue cardiac meds: aspirin, Brilinta, lipitor, metoprolol  - cardiology to follow - 4/1 s/p PCI with MACI x 2 to pLAD 100%. still w/ LCx disease 80%, pending staged PCI  - TTE here ef 45-50% w/ RWMA, stage I dCHF  - Plan for staged PCI once ALDEN improves - today 3.36  - Continue cardiac meds: aspirin, Brilinta, lipitor, metoprolol  - cardiology to follow

## 2019-04-07 LAB
ANION GAP SERPL CALC-SCNC: 14 MMOL/L — SIGNIFICANT CHANGE UP (ref 5–17)
BUN SERPL-MCNC: 54 MG/DL — HIGH (ref 7–23)
CALCIUM SERPL-MCNC: 9.1 MG/DL — SIGNIFICANT CHANGE UP (ref 8.4–10.5)
CHLORIDE SERPL-SCNC: 104 MMOL/L — SIGNIFICANT CHANGE UP (ref 96–108)
CO2 SERPL-SCNC: 20 MMOL/L — LOW (ref 22–31)
CREAT SERPL-MCNC: 3.36 MG/DL — HIGH (ref 0.5–1.3)
GLUCOSE BLDC GLUCOMTR-MCNC: 107 MG/DL — HIGH (ref 70–99)
GLUCOSE BLDC GLUCOMTR-MCNC: 108 MG/DL — HIGH (ref 70–99)
GLUCOSE BLDC GLUCOMTR-MCNC: 174 MG/DL — HIGH (ref 70–99)
GLUCOSE BLDC GLUCOMTR-MCNC: 98 MG/DL — SIGNIFICANT CHANGE UP (ref 70–99)
GLUCOSE SERPL-MCNC: 155 MG/DL — HIGH (ref 70–99)
POTASSIUM SERPL-MCNC: 4.1 MMOL/L — SIGNIFICANT CHANGE UP (ref 3.5–5.3)
POTASSIUM SERPL-SCNC: 4.1 MMOL/L — SIGNIFICANT CHANGE UP (ref 3.5–5.3)
SODIUM SERPL-SCNC: 138 MMOL/L — SIGNIFICANT CHANGE UP (ref 135–145)

## 2019-04-07 PROCEDURE — 99233 SBSQ HOSP IP/OBS HIGH 50: CPT

## 2019-04-07 RX ORDER — FLUTICASONE PROPIONATE 50 MCG
1 SPRAY, SUSPENSION NASAL DAILY
Qty: 0 | Refills: 0 | Status: DISCONTINUED | OUTPATIENT
Start: 2019-04-07 | End: 2019-04-08

## 2019-04-07 RX ADMIN — CARVEDILOL PHOSPHATE 12.5 MILLIGRAM(S): 80 CAPSULE, EXTENDED RELEASE ORAL at 06:28

## 2019-04-07 RX ADMIN — Medication 100 MILLIGRAM(S): at 06:28

## 2019-04-07 RX ADMIN — HEPARIN SODIUM 5000 UNIT(S): 5000 INJECTION INTRAVENOUS; SUBCUTANEOUS at 14:44

## 2019-04-07 RX ADMIN — Medication 100 MILLIGRAM(S): at 14:44

## 2019-04-07 RX ADMIN — DORZOLAMIDE HYDROCHLORIDE 1 DROP(S): 20 SOLUTION/ DROPS OPHTHALMIC at 06:28

## 2019-04-07 RX ADMIN — HEPARIN SODIUM 5000 UNIT(S): 5000 INJECTION INTRAVENOUS; SUBCUTANEOUS at 06:28

## 2019-04-07 RX ADMIN — BRIMONIDINE TARTRATE 1 DROP(S): 2 SOLUTION/ DROPS OPHTHALMIC at 17:26

## 2019-04-07 RX ADMIN — HEPARIN SODIUM 5000 UNIT(S): 5000 INJECTION INTRAVENOUS; SUBCUTANEOUS at 21:12

## 2019-04-07 RX ADMIN — ISOSORBIDE MONONITRATE 90 MILLIGRAM(S): 60 TABLET, EXTENDED RELEASE ORAL at 14:44

## 2019-04-07 RX ADMIN — LATANOPROST 1 DROP(S): 0.05 SOLUTION/ DROPS OPHTHALMIC; TOPICAL at 21:11

## 2019-04-07 RX ADMIN — TICAGRELOR 90 MILLIGRAM(S): 90 TABLET ORAL at 06:28

## 2019-04-07 RX ADMIN — TICAGRELOR 90 MILLIGRAM(S): 90 TABLET ORAL at 17:26

## 2019-04-07 RX ADMIN — BRIMONIDINE TARTRATE 1 DROP(S): 2 SOLUTION/ DROPS OPHTHALMIC at 06:28

## 2019-04-07 RX ADMIN — Medication 81 MILLIGRAM(S): at 14:44

## 2019-04-07 RX ADMIN — Medication 175 MICROGRAM(S): at 06:28

## 2019-04-07 RX ADMIN — Medication 1 SPRAY(S): at 17:30

## 2019-04-07 RX ADMIN — ATORVASTATIN CALCIUM 80 MILLIGRAM(S): 80 TABLET, FILM COATED ORAL at 21:11

## 2019-04-07 RX ADMIN — CARVEDILOL PHOSPHATE 12.5 MILLIGRAM(S): 80 CAPSULE, EXTENDED RELEASE ORAL at 17:26

## 2019-04-07 RX ADMIN — Medication 100 MILLIGRAM(S): at 21:12

## 2019-04-07 RX ADMIN — DORZOLAMIDE HYDROCHLORIDE 1 DROP(S): 20 SOLUTION/ DROPS OPHTHALMIC at 17:25

## 2019-04-07 RX ADMIN — PANTOPRAZOLE SODIUM 40 MILLIGRAM(S): 20 TABLET, DELAYED RELEASE ORAL at 06:27

## 2019-04-07 RX ADMIN — CHLORHEXIDINE GLUCONATE 1 APPLICATION(S): 213 SOLUTION TOPICAL at 22:16

## 2019-04-08 ENCOUNTER — TRANSCRIPTION ENCOUNTER (OUTPATIENT)
Age: 79
End: 2019-04-08

## 2019-04-08 VITALS
OXYGEN SATURATION: 96 % | SYSTOLIC BLOOD PRESSURE: 158 MMHG | HEART RATE: 69 BPM | DIASTOLIC BLOOD PRESSURE: 76 MMHG | TEMPERATURE: 98 F | RESPIRATION RATE: 16 BRPM

## 2019-04-08 DIAGNOSIS — I10 ESSENTIAL (PRIMARY) HYPERTENSION: ICD-10-CM

## 2019-04-08 DIAGNOSIS — E78.5 HYPERLIPIDEMIA, UNSPECIFIED: ICD-10-CM

## 2019-04-08 DIAGNOSIS — Z95.1 PRESENCE OF AORTOCORONARY BYPASS GRAFT: ICD-10-CM

## 2019-04-08 LAB
ANION GAP SERPL CALC-SCNC: 14 MMOL/L — SIGNIFICANT CHANGE UP (ref 5–17)
BUN SERPL-MCNC: 53 MG/DL — HIGH (ref 7–23)
CALCIUM SERPL-MCNC: 9.3 MG/DL — SIGNIFICANT CHANGE UP (ref 8.4–10.5)
CHLORIDE SERPL-SCNC: 105 MMOL/L — SIGNIFICANT CHANGE UP (ref 96–108)
CO2 SERPL-SCNC: 20 MMOL/L — LOW (ref 22–31)
CREAT SERPL-MCNC: 3.42 MG/DL — HIGH (ref 0.5–1.3)
GLUCOSE BLDC GLUCOMTR-MCNC: 121 MG/DL — HIGH (ref 70–99)
GLUCOSE BLDC GLUCOMTR-MCNC: 123 MG/DL — HIGH (ref 70–99)
GLUCOSE BLDC GLUCOMTR-MCNC: 127 MG/DL — HIGH (ref 70–99)
GLUCOSE SERPL-MCNC: 116 MG/DL — HIGH (ref 70–99)
HCT VFR BLD CALC: 34.5 % — LOW (ref 39–50)
HGB BLD-MCNC: 11.4 G/DL — LOW (ref 13–17)
MCHC RBC-ENTMCNC: 32.7 PG — SIGNIFICANT CHANGE UP (ref 27–34)
MCHC RBC-ENTMCNC: 33 GM/DL — SIGNIFICANT CHANGE UP (ref 32–36)
MCV RBC AUTO: 98.9 FL — SIGNIFICANT CHANGE UP (ref 80–100)
PLATELET # BLD AUTO: 174 K/UL — SIGNIFICANT CHANGE UP (ref 150–400)
POTASSIUM SERPL-MCNC: 4.2 MMOL/L — SIGNIFICANT CHANGE UP (ref 3.5–5.3)
POTASSIUM SERPL-SCNC: 4.2 MMOL/L — SIGNIFICANT CHANGE UP (ref 3.5–5.3)
RBC # BLD: 3.49 M/UL — LOW (ref 4.2–5.8)
RBC # FLD: 13.7 % — SIGNIFICANT CHANGE UP (ref 10.3–14.5)
SODIUM SERPL-SCNC: 139 MMOL/L — SIGNIFICANT CHANGE UP (ref 135–145)
WBC # BLD: 6.45 K/UL — SIGNIFICANT CHANGE UP (ref 3.8–10.5)
WBC # FLD AUTO: 6.45 K/UL — SIGNIFICANT CHANGE UP (ref 3.8–10.5)

## 2019-04-08 PROCEDURE — 83036 HEMOGLOBIN GLYCOSYLATED A1C: CPT

## 2019-04-08 PROCEDURE — 94640 AIRWAY INHALATION TREATMENT: CPT

## 2019-04-08 PROCEDURE — C1887: CPT

## 2019-04-08 PROCEDURE — C1894: CPT

## 2019-04-08 PROCEDURE — 85730 THROMBOPLASTIN TIME PARTIAL: CPT

## 2019-04-08 PROCEDURE — 85610 PROTHROMBIN TIME: CPT

## 2019-04-08 PROCEDURE — 80048 BASIC METABOLIC PNL TOTAL CA: CPT

## 2019-04-08 PROCEDURE — C9606: CPT | Mod: LD

## 2019-04-08 PROCEDURE — 85027 COMPLETE CBC AUTOMATED: CPT

## 2019-04-08 PROCEDURE — 71046 X-RAY EXAM CHEST 2 VIEWS: CPT

## 2019-04-08 PROCEDURE — 84100 ASSAY OF PHOSPHORUS: CPT

## 2019-04-08 PROCEDURE — 83880 ASSAY OF NATRIURETIC PEPTIDE: CPT

## 2019-04-08 PROCEDURE — 93005 ELECTROCARDIOGRAM TRACING: CPT

## 2019-04-08 PROCEDURE — 84436 ASSAY OF TOTAL THYROXINE: CPT

## 2019-04-08 PROCEDURE — 82570 ASSAY OF URINE CREATININE: CPT

## 2019-04-08 PROCEDURE — 86900 BLOOD TYPING SEROLOGIC ABO: CPT

## 2019-04-08 PROCEDURE — 99285 EMERGENCY DEPT VISIT HI MDM: CPT | Mod: 25

## 2019-04-08 PROCEDURE — 85384 FIBRINOGEN ACTIVITY: CPT

## 2019-04-08 PROCEDURE — 76775 US EXAM ABDO BACK WALL LIM: CPT

## 2019-04-08 PROCEDURE — 85379 FIBRIN DEGRADATION QUANT: CPT

## 2019-04-08 PROCEDURE — 83690 ASSAY OF LIPASE: CPT

## 2019-04-08 PROCEDURE — 82553 CREATINE MB FRACTION: CPT

## 2019-04-08 PROCEDURE — 82962 GLUCOSE BLOOD TEST: CPT

## 2019-04-08 PROCEDURE — C1769: CPT

## 2019-04-08 PROCEDURE — 99153 MOD SED SAME PHYS/QHP EA: CPT

## 2019-04-08 PROCEDURE — 80053 COMPREHEN METABOLIC PANEL: CPT

## 2019-04-08 PROCEDURE — 84484 ASSAY OF TROPONIN QUANT: CPT

## 2019-04-08 PROCEDURE — C1874: CPT

## 2019-04-08 PROCEDURE — 84133 ASSAY OF URINE POTASSIUM: CPT

## 2019-04-08 PROCEDURE — C1725: CPT

## 2019-04-08 PROCEDURE — 96374 THER/PROPH/DIAG INJ IV PUSH: CPT

## 2019-04-08 PROCEDURE — C1753: CPT

## 2019-04-08 PROCEDURE — 99152 MOD SED SAME PHYS/QHP 5/>YRS: CPT

## 2019-04-08 PROCEDURE — 82436 ASSAY OF URINE CHLORIDE: CPT

## 2019-04-08 PROCEDURE — 84300 ASSAY OF URINE SODIUM: CPT

## 2019-04-08 PROCEDURE — 86901 BLOOD TYPING SEROLOGIC RH(D): CPT

## 2019-04-08 PROCEDURE — C8929: CPT

## 2019-04-08 PROCEDURE — 83735 ASSAY OF MAGNESIUM: CPT

## 2019-04-08 PROCEDURE — 82550 ASSAY OF CK (CPK): CPT

## 2019-04-08 PROCEDURE — 84480 ASSAY TRIIODOTHYRONINE (T3): CPT

## 2019-04-08 PROCEDURE — 93458 L HRT ARTERY/VENTRICLE ANGIO: CPT | Mod: 59

## 2019-04-08 PROCEDURE — 80061 LIPID PANEL: CPT

## 2019-04-08 PROCEDURE — 81001 URINALYSIS AUTO W/SCOPE: CPT

## 2019-04-08 PROCEDURE — 84443 ASSAY THYROID STIM HORMONE: CPT

## 2019-04-08 PROCEDURE — 80076 HEPATIC FUNCTION PANEL: CPT

## 2019-04-08 PROCEDURE — 93970 EXTREMITY STUDY: CPT

## 2019-04-08 PROCEDURE — 99239 HOSP IP/OBS DSCHRG MGMT >30: CPT

## 2019-04-08 PROCEDURE — 86850 RBC ANTIBODY SCREEN: CPT

## 2019-04-08 PROCEDURE — 92978 ENDOLUMINL IVUS OCT C 1ST: CPT | Mod: LD

## 2019-04-08 PROCEDURE — 93308 TTE F-UP OR LMTD: CPT

## 2019-04-08 RX ORDER — LEVOTHYROXINE SODIUM 125 MCG
1 TABLET ORAL
Qty: 0 | Refills: 0 | DISCHARGE
Start: 2019-04-08

## 2019-04-08 RX ORDER — METOPROLOL TARTRATE 50 MG
1 TABLET ORAL
Qty: 0 | Refills: 0 | COMMUNITY

## 2019-04-08 RX ORDER — TICAGRELOR 90 MG/1
1 TABLET ORAL
Qty: 0 | Refills: 0 | COMMUNITY
Start: 2019-04-08

## 2019-04-08 RX ORDER — ATORVASTATIN CALCIUM 80 MG/1
1 TABLET, FILM COATED ORAL
Qty: 30 | Refills: 0
Start: 2019-04-08 | End: 2019-05-07

## 2019-04-08 RX ORDER — ATORVASTATIN CALCIUM 80 MG/1
1 TABLET, FILM COATED ORAL
Qty: 0 | Refills: 0 | COMMUNITY

## 2019-04-08 RX ORDER — HYDRALAZINE HCL 50 MG
1 TABLET ORAL
Qty: 0 | Refills: 0 | DISCHARGE
Start: 2019-04-08 | End: 2019-05-07

## 2019-04-08 RX ORDER — LATANOPROST 0.05 MG/ML
1 SOLUTION/ DROPS OPHTHALMIC; TOPICAL
Qty: 0 | Refills: 0 | COMMUNITY

## 2019-04-08 RX ORDER — LATANOPROST 0.05 MG/ML
1 SOLUTION/ DROPS OPHTHALMIC; TOPICAL
Qty: 0 | Refills: 0 | DISCHARGE
Start: 2019-04-08

## 2019-04-08 RX ORDER — ASPIRIN/CALCIUM CARB/MAGNESIUM 324 MG
1 TABLET ORAL
Qty: 0 | Refills: 0 | DISCHARGE
Start: 2019-04-08

## 2019-04-08 RX ORDER — HYDRALAZINE HCL 50 MG
1 TABLET ORAL
Qty: 90 | Refills: 0
Start: 2019-04-08 | End: 2019-05-07

## 2019-04-08 RX ORDER — LOSARTAN POTASSIUM 100 MG/1
1 TABLET, FILM COATED ORAL
Qty: 0 | Refills: 0 | COMMUNITY

## 2019-04-08 RX ORDER — LINAGLIPTIN 5 MG/1
1 TABLET, FILM COATED ORAL
Qty: 30 | Refills: 0
Start: 2019-04-08 | End: 2019-05-07

## 2019-04-08 RX ORDER — ISOSORBIDE MONONITRATE 60 MG/1
1 TABLET, EXTENDED RELEASE ORAL
Qty: 0 | Refills: 0 | DISCHARGE
Start: 2019-04-08 | End: 2019-05-07

## 2019-04-08 RX ORDER — AMLODIPINE BESYLATE 2.5 MG/1
5 TABLET ORAL DAILY
Qty: 0 | Refills: 0 | Status: DISCONTINUED | OUTPATIENT
Start: 2019-04-08 | End: 2019-04-08

## 2019-04-08 RX ORDER — METFORMIN HYDROCHLORIDE 850 MG/1
1 TABLET ORAL
Qty: 0 | Refills: 0 | COMMUNITY

## 2019-04-08 RX ORDER — LEVOTHYROXINE SODIUM 125 MCG
1 TABLET ORAL
Qty: 0 | Refills: 0 | COMMUNITY

## 2019-04-08 RX ORDER — CARVEDILOL PHOSPHATE 80 MG/1
1 CAPSULE, EXTENDED RELEASE ORAL
Qty: 60 | Refills: 0
Start: 2019-04-08 | End: 2019-05-07

## 2019-04-08 RX ORDER — ISOSORBIDE MONONITRATE 60 MG/1
3 TABLET, EXTENDED RELEASE ORAL
Qty: 90 | Refills: 0
Start: 2019-04-08 | End: 2019-05-07

## 2019-04-08 RX ORDER — ASPIRIN/CALCIUM CARB/MAGNESIUM 324 MG
2 TABLET ORAL
Qty: 0 | Refills: 0 | COMMUNITY

## 2019-04-08 RX ORDER — AMLODIPINE BESYLATE 2.5 MG/1
1 TABLET ORAL
Qty: 30 | Refills: 0
Start: 2019-04-08 | End: 2019-05-07

## 2019-04-08 RX ADMIN — Medication 100 MILLIGRAM(S): at 05:58

## 2019-04-08 RX ADMIN — Medication 100 MILLIGRAM(S): at 13:32

## 2019-04-08 RX ADMIN — AMLODIPINE BESYLATE 5 MILLIGRAM(S): 2.5 TABLET ORAL at 11:24

## 2019-04-08 RX ADMIN — ISOSORBIDE MONONITRATE 90 MILLIGRAM(S): 60 TABLET, EXTENDED RELEASE ORAL at 11:24

## 2019-04-08 RX ADMIN — TICAGRELOR 90 MILLIGRAM(S): 90 TABLET ORAL at 17:06

## 2019-04-08 RX ADMIN — Medication 175 MICROGRAM(S): at 05:58

## 2019-04-08 RX ADMIN — Medication 81 MILLIGRAM(S): at 11:24

## 2019-04-08 RX ADMIN — DORZOLAMIDE HYDROCHLORIDE 1 DROP(S): 20 SOLUTION/ DROPS OPHTHALMIC at 17:06

## 2019-04-08 RX ADMIN — BRIMONIDINE TARTRATE 1 DROP(S): 2 SOLUTION/ DROPS OPHTHALMIC at 17:06

## 2019-04-08 RX ADMIN — CARVEDILOL PHOSPHATE 12.5 MILLIGRAM(S): 80 CAPSULE, EXTENDED RELEASE ORAL at 17:06

## 2019-04-08 RX ADMIN — DORZOLAMIDE HYDROCHLORIDE 1 DROP(S): 20 SOLUTION/ DROPS OPHTHALMIC at 05:58

## 2019-04-08 RX ADMIN — HEPARIN SODIUM 5000 UNIT(S): 5000 INJECTION INTRAVENOUS; SUBCUTANEOUS at 05:59

## 2019-04-08 RX ADMIN — PANTOPRAZOLE SODIUM 40 MILLIGRAM(S): 20 TABLET, DELAYED RELEASE ORAL at 05:58

## 2019-04-08 RX ADMIN — TICAGRELOR 90 MILLIGRAM(S): 90 TABLET ORAL at 05:57

## 2019-04-08 RX ADMIN — BRIMONIDINE TARTRATE 1 DROP(S): 2 SOLUTION/ DROPS OPHTHALMIC at 05:59

## 2019-04-08 RX ADMIN — HEPARIN SODIUM 5000 UNIT(S): 5000 INJECTION INTRAVENOUS; SUBCUTANEOUS at 13:33

## 2019-04-08 RX ADMIN — CARVEDILOL PHOSPHATE 12.5 MILLIGRAM(S): 80 CAPSULE, EXTENDED RELEASE ORAL at 05:58

## 2019-04-08 RX ADMIN — Medication 1 SPRAY(S): at 11:24

## 2019-04-08 NOTE — DISCHARGE NOTE PROVIDER - NSDCFUADDAPPT_GEN_ALL_CORE_FT
Follow up with Cardiologist as an outpatient.   Follow up with Nephrologist to follow up regarding your Kidney function test.  Follow up with your PMD within 1 week of discharge.   Follow up your TFTs (Thyroid Function Test) in 4 weeks 5/2019. Follow up with Cardiologist as an outpatient.   Follow up with Nephrologist to follow up regarding your Kidney function test IN 2 WEEKS.  Follow up with your PMD within 1 week of discharge.   Follow up your TFTs (Thyroid Function Test) in 4 weeks 5/2019.

## 2019-04-08 NOTE — DISCHARGE NOTE PROVIDER - CARE PROVIDER_API CALL
Cardiology, Clinic  [address] [place] [id number]  Phone: [phone]  Fax: (   )    -  Follow Up Time:     Natalia Sharma)  Internal Medicine; Nephrology  [address] [place] [address]  Phone: [phone]  Fax: [phone]  Follow Up Time:     Zheng Joya (PMD)  Phone: [phone]  Fax: (   )    -  Follow Up Time:

## 2019-04-08 NOTE — DISCHARGE NOTE PROVIDER - PROVIDER TOKENS
FREE:[LAST:[Cardiology],FIRST:[Clinic],PHONE:[phone]],FAX:[(   )    -],ADDRESS:[[address] [place] [address]]],PROVIDER:[TOKEN:[[id number]]],FREE:[LAST:[Toan (PMD)],FIRST:[Zheng],PHONE:[phone]],FAX:[(   )    -]]

## 2019-04-08 NOTE — PROGRESS NOTE ADULT - ATTENDING COMMENTS
No improvement in renal function. baseline records- serum creat was 2.0 in sep 2018. Will need CKD work up. 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 Would hold off on second PCI for now unless emergent. Please make appointment to follow nephrology - [phone] in 2 weeks.
Patient is seen and examined with fellow, NP and the CCU house-staff. I agree with the history, physical and the assessment and plan.  NSTEMI s/p PCI to pLAD  -continue dual antiplatelet therapy  -continue statin  -trend cardiac enzymes  - plan for cardiac catheterization for LCx once renal functon is stable  - started on Beta-blocker   - will increase the imdur  - if BP still elevated would change lopressor to coreg
Patient is seen and examined with fellow, NP and the CCU house-staff. I agree with the history, physical and the assessment and plan.  NSTEMI s/p PCI  staged PCI once renl funtion normalizes  on DAPT  of  of nitro gtt  optimizing his po BP meds
Adriana Comer MD  Division of Hospital Medicine  Pager: [phone]  Office: [phone]
Adriana Comer MD  Division of Hospital Medicine  Pager: [phone]  Office: [phone]

## 2019-04-08 NOTE — PROGRESS NOTE ADULT - REASON FOR ADMISSION
Hypertensive urgency, NSTEMI

## 2019-04-08 NOTE — PROGRESS NOTE ADULT - PROBLEM SELECTOR PLAN 1
admitted with a creat of 3.0 - unsure if he has baseline ckd. had lloyd sec to contrast nephropathy and creat is still worsening. hold off on staged PCI for now. urinalysis shows protein- 100. Awaiting U P/C ratio, renal sonogram and bladder sonogram. Creatinine noted to be 3.46 today and stable. Will continue to monitor.

## 2019-04-08 NOTE — DISCHARGE NOTE NURSING/CASE MANAGEMENT/SOCIAL WORK - NSDCPEEMAIL_GEN_ALL_CORE
[facility] for Tobacco Control email [email or web address]

## 2019-04-08 NOTE — DISCHARGE NOTE PROVIDER - NSDCCPCAREPLAN_GEN_ALL_CORE_FT
PRINCIPAL DISCHARGE DIAGNOSIS  Diagnosis: Hypertensive emergency  Assessment and Plan of Treatment: Low salt diet  Activity as tolerated.  Take all medication as prescribed.  Follow up with your medical doctor for routine blood pressure monitoring at your next visit.  Notify your doctor if you have any of the following symptoms:   Dizziness, Lightheadedness, Blurry vision, Headache, Chest pain, Shortness of breath        SECONDARY DISCHARGE DIAGNOSES  Diagnosis: DM (diabetes mellitus)  Assessment and Plan of Treatment: Make sure you get your HgA1c checked every three months.  If you take oral diabetes medications, check your blood glucose two times a day.  If you take insulin, check your blood glucose before meals and at bedtime.  It's important not to skip any meals.  Keep a log of your blood glucose results and always take it with you to your doctor appointments.  Keep a list of your current medications including injectables and over the counter medications and bring this medication list with you to all your doctor appointments.  If you have not seen your opthalmologist this year call for appointment.  Check your feet daily for redness, sores, or openings. Do not self treat. If no improvement in two days call your primary care physician for an appointment.  Low blood sugar (hypoglycemia) is a blood sugar below 70mg/dl. Check your blood sugar if you feel signs/symptoms of hypoglycemia. If your blood sugar is below 70 take 15 grams of carbohydrates (ex 4 oz of apple juice, 3-4 glucosr tablets, or 4-6 oz of regular soda) wait 15 minutes and repeat blood sugar to make sure it comes up above 70.  If your blood sugar is above 70 and you are due for a meal, have a meal.  If you are not due for a meal have a snack.  This snack helps keeps your blood sugar at a safe range.      Diagnosis: Acute kidney injury superimposed on CKD  Assessment and Plan of Treatment: Avoid taking (NSAIDs) - (ex: Ibuprofen, Advil, Celebrex, Naprosyn)  Avoid taking any nephrotoxic agents (can harm kidneys) - Intravenous contrast for diagnostic testing, combination cold medications.  Have all medications adjusted for your renal function by your Health Care Provider.  Blood pressure control is important.  Take all medication as prescribed.      Diagnosis: CAD (coronary artery disease)  Assessment and Plan of Treatment: Coronary artery disease is a condition where the arteries the supply the heart muscle get clogges with fatty deposits & puts you at risk for a heart attack  Call your doctor if you have any new pain, pressure, or discomfort in the center of your chest, pain, tingling or discomfort in arms, back, neck, jaw, or stomach, shortness of breath, nausea, vomiting, burping or heartburn, sweating, cold and clammy skin, racing or abnormal heartbeat for more than 10 minutes or if they keep coming & going.  Call 911 and do not tr to get to hospital by care  You can help yourself with lefestyle changes (quitting smoking if you smoke), eat lots of fruits & vegetables & low fat dairy products, not a lot of meat & fatty foods, walk or some form of physical activity most days of the week, lose weight if you are overweight  Take your cardiac medication as prescribed to lower cholesterol, to lower blood pressure, aspirin to prevent blood clots, and diabetes control  Make sure to keep appointments with doctor for cardiac follow up care      Diagnosis: Hypothyroidism, unspecified type  Assessment and Plan of Treatment: you do not make enough thyroid hormone  signs & symptoms of low levels of thyroid hormone - tired, getting cold easily, coarse or thin hair, constipation, shortness of breath, swelling, irregular periods  your doctor will do thyroid hormone blood tests at least once a year to monitor if medication dose is adequate  take your thyroid medicine as directed by your doctor & on empty stomach PRINCIPAL DISCHARGE DIAGNOSIS  Diagnosis: Hypertensive emergency  Assessment and Plan of Treatment: Low salt diet  Activity as tolerated.  Take all medication as prescribed.  Follow up with your medical doctor for routine blood pressure monitoring at your next visit.  Notify your doctor if you have any of the following symptoms:   Dizziness, Lightheadedness, Blurry vision, Headache, Chest pain, Shortness of breath        SECONDARY DISCHARGE DIAGNOSES  Diagnosis: Hypothyroidism, unspecified type  Assessment and Plan of Treatment: you do not make enough thyroid hormone  you need to have your THYROID TESTS REPEATED IN MAY 2019 because your TSH was high.  signs & symptoms of low levels of thyroid hormone - tired, getting cold easily, coarse or thin hair, constipation, shortness of breath, swelling, irregular periods  your doctor will do thyroid hormone blood tests at least once a year to monitor if medication dose is adequate  take your thyroid medicine as directed by your doctor & on empty stomach      Diagnosis: Acute kidney injury superimposed on CKD  Assessment and Plan of Treatment: Avoid taking (NSAIDs) - (ex: Ibuprofen, Advil, Celebrex, Naprosyn)  Avoid taking any nephrotoxic agents (can harm kidneys) - Intravenous contrast for diagnostic testing, combination cold medications.  Have all medications adjusted for your renal function by your Health Care Provider.  Blood pressure control is important.  Take all medication as prescribed.      Diagnosis: DM (diabetes mellitus)  Assessment and Plan of Treatment: Make sure you get your HgA1c checked every three months.  If you take oral diabetes medications, check your blood glucose two times a day.  If you take insulin, check your blood glucose before meals and at bedtime.  It's important not to skip any meals.  Keep a log of your blood glucose results and always take it with you to your doctor appointments.  Keep a list of your current medications including injectables and over the counter medications and bring this medication list with you to all your doctor appointments.  If you have not seen your opthalmologist this year call for appointment.  Check your feet daily for redness, sores, or openings. Do not self treat. If no improvement in two days call your primary care physician for an appointment.  Low blood sugar (hypoglycemia) is a blood sugar below 70mg/dl. Check your blood sugar if you feel signs/symptoms of hypoglycemia. If your blood sugar is below 70 take 15 grams of carbohydrates (ex 4 oz of apple juice, 3-4 glucosr tablets, or 4-6 oz of regular soda) wait 15 minutes and repeat blood sugar to make sure it comes up above 70.  If your blood sugar is above 70 and you are due for a meal, have a meal.  If you are not due for a meal have a snack.  This snack helps keeps your blood sugar at a safe range.      Diagnosis: CAD (coronary artery disease)  Assessment and Plan of Treatment: Coronary artery disease is a condition where the arteries the supply the heart muscle get clogges with fatty deposits & puts you at risk for a heart attack  Call your doctor if you have any new pain, pressure, or discomfort in the center of your chest, pain, tingling or discomfort in arms, back, neck, jaw, or stomach, shortness of breath, nausea, vomiting, burping or heartburn, sweating, cold and clammy skin, racing or abnormal heartbeat for more than 10 minutes or if they keep coming & going.  Call 911 and do not tr to get to hospital by care  You can help yourself with lefestyle changes (quitting smoking if you smoke), eat lots of fruits & vegetables & low fat dairy products, not a lot of meat & fatty foods, walk or some form of physical activity most days of the week, lose weight if you are overweight  Take your cardiac medication as prescribed to lower cholesterol, to lower blood pressure, aspirin to prevent blood clots, and diabetes control  Make sure to keep appointments with doctor for cardiac follow up care

## 2019-04-08 NOTE — PROGRESS NOTE ADULT - SUBJECTIVE AND OBJECTIVE BOX
[facility] Division of Kidney Diseases & Hypertension  FOLLOW UP NOTE  [phone]--------------------------------------------------------------------------------  Chief Complaint:Hypertensive emergency      24 hour events/subjective: Patient with no acute events overnight. Resting comfortably in bed however states he has congestion. Otherwise no chest pain.      PAST HISTORY  --------------------------------------------------------------------------------  No significant changes to PMH, PSH, FHx, SHx, unless otherwise noted    ALLERGIES & MEDICATIONS  --------------------------------------------------------------------------------  Allergies    No Known Allergies    Intolerances      Standing Inpatient Medications  aspirin enteric coated 81 milliGRAM(s) Oral daily  atorvastatin 80 milliGRAM(s) Oral at bedtime  brimonidine 0.2% Ophthalmic Solution 1 Drop(s) Both EYES two times a day  carvedilol 12.5 milliGRAM(s) Oral every 12 hours  chlorhexidine 4% Liquid 1 Application(s) Topical <User Schedule>  dorzolamide 2% Ophthalmic Solution 1 Drop(s) Both EYES <User Schedule>  fluticasone propionate 50 MICROgram(s)/spray Nasal Spray 1 Spray(s) Both Nostrils daily  heparin  Injectable 5000 Unit(s) SubCutaneous every 8 hours  hydrALAZINE 100 milliGRAM(s) Oral every 8 hours  insulin lispro (HumaLOG) corrective regimen sliding scale   SubCutaneous three times a day before meals  insulin lispro (HumaLOG) corrective regimen sliding scale   SubCutaneous at bedtime  isosorbide   mononitrate ER Tablet (IMDUR) 90 milliGRAM(s) Oral daily  latanoprost 0.005% Ophthalmic Solution 1 Drop(s) Both EYES at bedtime  levothyroxine 175 MICROGram(s) Oral daily  pantoprazole    Tablet 40 milliGRAM(s) Oral before breakfast  ticagrelor 90 milliGRAM(s) Oral two times a day    PRN Inpatient Medications  dextrose 40% Gel 15 Gram(s) Oral once PRN  glucagon  Injectable 1 milliGRAM(s) IntraMuscular once PRN      REVIEW OF SYSTEMS  --------------------------------------------------------------------------------  Gen: No fevers/chills  Head/Eyes/Ears/Mouth: Mild headache  Respiratory: Congestion, mild dyspnea.  CV: No chest pain, PND, orthopnea  GI: No abdominal pain, diarrhea, constipation, nausea, vomiting  MSK: No joint pain/swelling; no back pain; no edema  Neuro: No dizziness/lightheadedness, weakness, seizures, numbness, tingling      All other systems were reviewed and are negative, except as noted.    VITALS/PHYSICAL EXAM  --------------------------------------------------------------------------------  T(C): 36.7 (04-08-19 @ 04:47), Max: 36.7 (04-08-19 @ 04:47)  HR: 68 (04-08-19 @ 04:47) (60 - 70)  BP: 151/69 (04-08-19 @ 04:47) (140/63 - 182/78)  RR: 16 (04-08-19 @ 04:47) (16 - 16)  SpO2: 97% (04-08-19 @ 04:47) (95% - 97%)  Wt(kg): --        04-07-19 @ 07:01  -  04-08-19 @ 07:00  --------------------------------------------------------  IN: 640 mL / OUT: 1200 mL / NET: -560 mL      Physical Exam:  	Gen: NAD, well-appearing  	HEENT: Anicteric  	Pulm: CTA B/L  	CV: RRR, S1S2;  	Abd: +BS, soft, non-distended              Extremities: no bilateral LE edema noted.               Neuro: No focal deficits  	Skin: Warm  	Vascular: No cyanosis    LABS/STUDIES  --------------------------------------------------------------------------------              11.4   6.45  >-----------<  174      [04-08-19 @ 09:08]              34.5     139  |  105  |  53  ----------------------------<  116      [04-08-19 @ 06:26]  4.2   |  20  |  3.42        Ca     9.3     [04-08-19 @ 06:26]            Creatinine Trend:  SCr 3.42 [04-08 @ 06:26]  SCr 3.36 [04-07 @ 06:12]  SCr 3.69 [04-06 @ 06:10]  SCr 3.77 [04-05 @ 05:41]  SCr 3.58 [04-04 @ 06:16]    Urinalysis - [04-05-19 @ 09:22]      Color Light Yellow / Appearance Clear / SG 1.009 / pH 6.5      Gluc Negative / Ketone Negative  / Bili Negative / Urobili <2 mg/dL       Blood Negative / Protein 100 mg/dL / Leuk Est Negative / Nitrite Negative      RBC 0 / WBC 0 / Hyaline 0 / Gran  / Sq Epi  / Non Sq Epi 0 / Bacteria Negative    Urine Creatinine 60      [04-04-19 @ 00:08]  Urine Sodium 22      [04-04-19 @ 00:08]  Urine Potassium 28      [04-04-19 @ 00:08]  Urine Chloride <35      [04-04-19 @ 00:08]    HbA1c 6.3      [04-01-19 @ 20:29]  TSH 37.10      [04-04-19 @ 09:22]  Lipid: chol 217, , HDL 46,       [04-01-19 @ 19:44]

## 2019-04-08 NOTE — DISCHARGE NOTE PROVIDER - HOSPITAL COURSE
9 yo Urdu speaking M PMH HTN, DMT2, CAD with stents x2 (2000), hypothyroid, depression, transferred from [facility] for NSTEMI and hypertensive urgency s/p CCU stay requiring on nitro gtt now s/p PCI with MACI x 2 to pLAD 4/1, c/b ALDEN/RAMIRO pending staged PCI to LCx when Cr improves.         Problem/Plan - 1:    ·  Problem: ALDEN (acute kidney injury).  Plan: - ALDEN likely RAMIRO. Suspect underlying CKD (unclear baseline Cr).    - RENAL U/S w/ med renal disease, no hydro    - Avoid nephrotoxins. monitor I/o's, uop    - renal consulted - appreciate recs - continue to monitor    - home losartan on hold    - Scr -  3.36.             Problem/Plan - 2:    ·  Problem: NSTEMI (non-ST elevated myocardial infarction).  Plan: - 4/1 s/p PCI with MACI x 2 to pLAD 100%. still w/ LCx disease 80%, pending staged PCI    - TTE here ef 45-50% w/ RWMA, stage I dCHF    - Plan for staged PCI once ALDEN improves - today 3.36    - Continue cardiac meds: aspirin, Brilinta, lipitor, metoprolol    - cardiology to follow.             Problem/Plan - 3:    ·  Problem: Hypertensive crisis.  Plan: - HTN urgency, bp improved but SBP still 140s-170s    - Hydralazine to 100 mg TID    - Imdur 90 mg daily    - Metoprolol tartrate 50 mg BID    - Monitor vital signs closely.             Problem/Plan - 4:    ·  Problem: Hypothyroidism, unspecified type.  Plan: - TSH 30s, but t4 and t3 wnl.     - c/w home synthroid 175 mcg/day for now    - synthroid to be taken before breakfast    - repeat TFTs in 4 weeks 5/2019.             Problem/Plan - 5:    ·  Problem: Type 2 diabetes mellitus without complication, without long-term current use of insulin.  Plan: - HgA1c 6.3. on metformin and glipizide at home    - Continue HISS for now.    - Monitor FS qAC, qHS.    - Hypoglycemia protocol.    - Will need close follow up with Dr. Zheng Joya (PMD) [phone]. 9 yo Irish speaking M PMH HTN, DMT2, CAD with stents x2 (2000), hypothyroid, depression, transferred from [facility] for NSTEMI and hypertensive urgency s/p CCU stay requiring on nitro gtt now s/p PCI with MACI x 2 to pLAD 4/1, c/b ALDEN/RAMIRO pending staged PCI to LCx when Cr improves.         Problem/Plan - 1:    ·  Problem: ALDEN (acute kidney injury).  Plan: - ALDEN likely RAMIRO. Suspect underlying CKD (unclear baseline Cr).    - RENAL U/S w/ med renal disease, no hydro    - Avoid nephrotoxins. monitor I/o's, uop    - renal consulted - appreciate recs - continue to monitor    - home losartan on hold    - Scr -  3.36.             Problem/Plan - 2:    ·  Problem: NSTEMI (non-ST elevated myocardial infarction).  Plan: - 4/1 s/p PCI with MACI x 2 to pLAD 100%. still w/ LCx disease 80%, pending staged PCI    - TTE here ef 45-50% w/ RWMA, stage I dCHF    - Plan for staged PCI once ALDEN improves - today 3.36    - Continue cardiac meds: aspirin, Brilinta, lipitor, metoprolol         Problem/Plan - 3:    ·  Problem: Hypertensive crisis.  Plan: - HTN urgency, bp improved but SBP still 140s-170s    - Hydralazine to 100 mg TID    - Imdur 90 mg daily    - Metoprolol tartrate 50 mg BID    - Monitor vital signs closely.             Problem/Plan - 4:    ·  Problem: Hypothyroidism, unspecified type.  Plan: - TSH 30s, but t4 and t3 wnl.     - c/w home synthroid 175 mcg/day for now    - repeat TFTs in 4 weeks 5/2019.             Problem/Plan - 5:    ·  Problem: Type 2 diabetes mellitus without complication, without long-term current use of insulin.  Plan: - HgA1c 6.3. on metformin and glipizide at home    - Continue HISS for now.    - Will need close follow up with Dr. Zheng Joya (PMD) [phone].         Pt Stable dc home with outpt follow up with PMD, Cardiologist and Nephrologist. 77 yo Anguillan speaking M PMH HTN, DMT2, CAD with stents x2 (2000), hypothyroid, depression, transferred from [facility] for NSTEMI and hypertensive crisis/urgency s/p CCU stay requiring on nitro gtt now s/p PCI with MACI x 2 to pLAD 4/1, c/b ALDEN/RAMIRO pending staged PCI to LCx when Cr improves. Cr stabilized 3.4 however far off from pt's baseline Cr 2, and thus will need more time for recovery and will f/u with renal and cardiology as outpatient to determine timing.         Problem/Plan - 1:    ·  Problem: ALDEN (acute kidney injury).  Plan: - ALDEN likely RAMIRO. 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 monitor I/o's, uop    - renal consulted - appreciate recs - continue to monitor    - home losartan on hold             Problem/Plan - 2:    ·  Problem: NSTEMI (non-ST elevated myocardial infarction).  Plan: - 4/1 s/p PCI with MACI x 2 to pLAD 100%. still w/ LCx disease 80%, pending staged PCI which can be done as outpatient as d/w cardiology    - TTE here ef 45-50% w/ RWMA, stage I dCHF    - Plan for staged PCI once ALDEN improves closely to baseline Cr 2. Pt otherwise asymptomatic while ambulating.    - Continue cardiac meds: aspirin, Brilinta, lipitor, metoprolol         Problem/Plan - 3:    ·  Problem: Hypertensive crisis.  Plan: - HTN urgency, bp improved but SBP still 140s-170s    - Hydralazine to 100 mg TID    - Imdur 90 mg daily    - coreg 12.5 bid    - added amlodipine 5 mg po qd    - Monitor vital signs closely.             Problem/Plan - 4:    ·  Problem: Hypothyroidism, unspecified type.  Plan: - TSH 30s, but t4 and t3 wnl.     - c/w home synthroid 175 mcg/day for now    - repeat TFTs in 4 weeks 5/2019.             Problem/Plan - 5:    ·  Problem: Type 2 diabetes mellitus without complication, without long-term current use of insulin.  Plan: - HgA1c 6.3. on metformin and glipizide at home    - Continue HISS for now.    - Will need close follow up with Dr. Zheng Joya (PMD) [phone], UPDATED ON PLAN.    - to hold off on metformin and will d/c on tradjenta instead given ALDEN        Pt Stable dc home with outpt follow up with PMD, Cardiologist and Nephrologist. d/w pt's enoch Caraballo, all questions answered.

## 2019-04-08 NOTE — DISCHARGE NOTE NURSING/CASE MANAGEMENT/SOCIAL WORK - NSDCFUADDAPPT_GEN_ALL_CORE_FT
Follow up with Cardiologist as an outpatient.   Follow up with Nephrologist to follow up regarding your Kidney function test IN 2 WEEKS.  Follow up with your PMD within 1 week of discharge.   Follow up your TFTs (Thyroid Function Test) in 4 weeks 5/2019.

## 2019-04-08 NOTE — DISCHARGE NOTE NURSING/CASE MANAGEMENT/SOCIAL WORK - NSDCPEWEB_GEN_ALL_CORE
[place] website --- [email or web address] website --- [email or web address]

## 2019-04-08 NOTE — DISCHARGE NOTE NURSING/CASE MANAGEMENT/SOCIAL WORK - NSDCDPATPORTLINK_GEN_ALL_CORE
You can access the [facility] Patient Portal, offered by [facility], by registering with the following website: [email or web address]

## 2019-04-08 NOTE — CHART NOTE - NSCHARTNOTEFT_GEN_A_CORE
seen at bedside denies complaints, denies cp, sob, abd pain, n/v/f/chills. ambulating without symptoms without cp, sob.  vss - still w/ HTN SBP 140s-180s  Labs reviewed - Cr still elevated, stabilizing 3.4 however pt's baseline 2.0 9/2018 outpatient labs  - d/w renal , will have pt f/u in clinic in 2 weeks [phone]   - d/w cardiology Dr Merrill, pt is safe for outpatient staged PCI, will f/u with cardiology clinic in 1-2 weeks to decide on timing depending on Cr  - d/c home today, I called and spoke w/ Dr. Zheng Joya (PMD) [phone] and updated on hospital course and he is in agreement with plan.   - added amlodipine today, c/w incr hydralazine for HTN urgency, meds to be sent to [facility]  d/c time 51 min.  d/w daughter Rashmi Resendez over phone, all ques answered. seen at bedside denies complaints, denies cp, sob, abd pain, n/v/f/chills. ambulating without symptoms without cp, sob.  vss - still w/ HTN SBP 140s-180s  Labs reviewed - Cr still elevated, stabilizing 3.4 however pt's baseline 2.0 9/2018 outpatient labs  - d/w renal , will have pt f/u in clinic in 2 weeks [phone]   - d/w cardiology Dr Merrill, pt is safe for outpatient staged PCI, will f/u with cardiology clinic in 1-2 weeks to decide on timing depending on Cr  - d/c home today, I called and spoke w/ Dr. Zheng Joya (PMD) [phone] and updated on hospital course and he is in agreement with plan.   - added amlodipine today, c/w incr hydralazine for HTN urgency, meds to be sent to [facility]  - HOLD metformin and glipizide given ALDEN. To go home on low dose tradjenta until Cr improves.  d/c time 51 min.  d/w daughter Rashmi Resendez over phone, all ques answered.

## 2019-04-08 NOTE — PROGRESS NOTE ADULT - PROBLEM SELECTOR PROBLEM 1
Acute kidney injury superimposed on CKD
ALDEN (acute kidney injury)
NSTEMI (non-ST elevated myocardial infarction)
ALDEN (acute kidney injury)
Acute kidney injury superimposed on CKD
NSTEMI (non-ST elevated myocardial infarction)

## 2019-04-11 PROBLEM — H40.9 UNSPECIFIED GLAUCOMA: Chronic | Status: ACTIVE | Noted: 2019-04-01

## 2019-04-11 PROBLEM — I25.10 ATHEROSCLEROTIC HEART DISEASE OF NATIVE CORONARY ARTERY WITHOUT ANGINA PECTORIS: Chronic | Status: ACTIVE | Noted: 2019-04-01

## 2019-04-11 PROBLEM — E11.9 TYPE 2 DIABETES MELLITUS WITHOUT COMPLICATIONS: Chronic | Status: ACTIVE | Noted: 2019-04-01

## 2019-04-11 PROBLEM — E78.5 HYPERLIPIDEMIA, UNSPECIFIED: Chronic | Status: ACTIVE | Noted: 2019-04-01

## 2019-04-11 PROBLEM — Z87.442 PERSONAL HISTORY OF URINARY CALCULI: Chronic | Status: ACTIVE | Noted: 2019-04-01

## 2019-04-24 ENCOUNTER — APPOINTMENT (OUTPATIENT)
Dept: CARDIOLOGY | Facility: HOSPITAL | Age: 79
End: 2019-04-24

## 2019-04-24 ENCOUNTER — OUTPATIENT (OUTPATIENT)
Dept: OUTPATIENT SERVICES | Facility: HOSPITAL | Age: 79
LOS: 1 days | End: 2019-04-24
Payer: MEDICARE

## 2019-04-24 VITALS
HEART RATE: 65 BPM | DIASTOLIC BLOOD PRESSURE: 77 MMHG | HEIGHT: 71 IN | WEIGHT: 250 LBS | SYSTOLIC BLOOD PRESSURE: 134 MMHG | OXYGEN SATURATION: 97 % | BODY MASS INDEX: 35 KG/M2

## 2019-04-24 DIAGNOSIS — I25.10 ATHEROSCLEROTIC HEART DISEASE OF NATIVE CORONARY ARTERY WITHOUT ANGINA PECTORIS: ICD-10-CM

## 2019-04-24 DIAGNOSIS — Z98.890 OTHER SPECIFIED POSTPROCEDURAL STATES: Chronic | ICD-10-CM

## 2019-04-24 PROCEDURE — G0463: CPT

## 2019-04-24 PROCEDURE — 93005 ELECTROCARDIOGRAM TRACING: CPT

## 2019-04-24 RX ORDER — LINAGLIPTIN 5 MG/1
5 TABLET, FILM COATED ORAL DAILY
Qty: 30 | Refills: 3 | Status: ACTIVE | COMMUNITY
Start: 2019-04-24

## 2019-04-24 NOTE — DISCUSSION/SUMMARY
[FreeTextEntry1] : 77 yo man w/ hx of DM2, HTN, CAD s/p PCI 2000, CKD who presented for NSTEMI s/p MACI to LAD.  Still w/ lesions in the Cx/D1/D2 branches.  However not intervened due to advanced CKD.  \par Hypervolemic on exam and still exertional chest pain, which is concerning but could be related to his HF sx's.  ECG w/ poor R wave progress and no new ischemic changes \par \par RECS\par - cont dapt, statin, coreg, imdur/hydralazine \par - start PO lasix 80 mg Qam/40 mg QPM \par - nephrology f/u pending\par \par RTC in 1 week.  Would like pt to be optimized more from a volume status, and obtained input from Nephrology regarding his advanced CKD prior to repeat LHC as he's at very high risk of developing ESRD requiring HD.  \par \par Akira Wilson MD \par \par

## 2019-04-24 NOTE — PHYSICAL EXAM
[General Appearance - Well Developed] : well developed [Normal Appearance] : normal appearance [General Appearance - Well Nourished] : well nourished [Normal Conjunctiva] : the conjunctiva exhibited no abnormalities [Normal Oral Mucosa] : normal oral mucosa [Auscultation Breath Sounds / Voice Sounds] : lungs were clear to auscultation bilaterally [Heart Rate And Rhythm] : heart rate and rhythm were normal [Heart Sounds] : normal S1 and S2 [Murmurs] : no murmurs present [Bowel Sounds] : normal bowel sounds [Abdomen Tenderness] : non-tender [] : no hepato-splenomegaly [Abnormal Walk] : normal gait [Skin Color & Pigmentation] : normal skin color and pigmentation [FreeTextEntry1] : +BLE edema

## 2019-04-24 NOTE — HISTORY OF PRESENT ILLNESS
[FreeTextEntry1] : 79 yo man w/ hx of DM2, HTN, CAD s/p PCI 2000, CKD who presented to [facility] a few weeks ago w/ an NSTEMI.  Underwent LHC on 4/2/2019 and found to have multi-vessel CAD (pLAD 100%, D1/D2 90%, pCX 80%).  Underwent MACI x 2 to pLAD.  Due to worsening renal function (Cr ~ 3-3.6), did not undergo further interventions.  \par \par As per patient/daughter, has felt worsening exertional dyspnea/left sided chest pressure, orthopnea/PND, abd distention and leg swelling since discharge.  Despite this, the left sided chest pressure is much improved as compared to prior to PCI.  \par \par No other sx's.  \par \par Cardiac Hx \par \par TTE 4/2/2019 \par  Conclusions:\par 1. Mitral valve not well visualized, probably normal.\par Minimal mitral regurgitation.\par 2. Normal trileaflet aortic valve. No aortic valve\par regurgitation seen.\par 3. Mild segmental left ventricular systolic dysfunction.\par The mid inferolateral wall, the basal  inferolateral wall,\par the basal anterolateral wall, and the mid anterolateral\par wall are hypokinetic.\par 4. Mild diastolic dysfunction (Stage I).\par 5. Normal right ventricular size and function.\par *** No previous Echo exam. \par \par LHC 4/1/2019 \par \par LM:   --  Distal left main: Angiography showed minor luminal irregularities\par with no flow limiting lesions.\par LAD:   --  Proximal LAD: There was a diffuse 100 % stenosis. This is a\par likely culprit for the patient's clinical presentation.\par --  D1: There was a tubular 90 % stenosis.\par --  D2: There was a discrete 90 % stenosis at the ostium of the vessel\par segment.\par CX:   --  Proximal circumflex: There was a discrete 80 % stenosis.\par --  OM1: Angiography showed minor luminal irregularities with no flow\par limiting lesions.\par --  OM2: Angiography showed minor luminal irregularities with no flow\par limiting lesions.\par RCA:   --  Proximal RCA: Angiography showed minor luminal irregularities\par with no flow limiting lesions.\par --  Mid RCA: Angiography showed mild atherosclerosis with no flow limiting\par lesions.\par --  RPLS: Angiography showed minor luminal irregularities with no flow\par limiting lesions.\par COMPLICATIONS: There were no complications.\par DIAGNOSTIC IMPRESSIONS: Severe CAD. Occluded LAD. Severediag and prox LCx\par disesae. No LV gram due to severe CKD.\par INTERVENTIONAL RECOMMENDATIONS: Aspirin and Brilinta

## 2019-04-24 NOTE — REVIEW OF SYSTEMS
[Shortness Of Breath] : shortness of breath [Dyspnea on exertion] : dyspnea during exertion [Chest  Pressure] : chest pressure [Lower Ext Edema] : lower extremity edema [Negative] : Neurological [Cough] : no cough

## 2019-04-25 ENCOUNTER — APPOINTMENT (OUTPATIENT)
Dept: NEPHROLOGY | Facility: CLINIC | Age: 79
End: 2019-04-25
Payer: MEDICARE

## 2019-04-25 VITALS
BODY MASS INDEX: 34.72 KG/M2 | SYSTOLIC BLOOD PRESSURE: 143 MMHG | WEIGHT: 248.02 LBS | OXYGEN SATURATION: 95 % | DIASTOLIC BLOOD PRESSURE: 73 MMHG | HEART RATE: 66 BPM | HEIGHT: 71 IN

## 2019-04-25 PROCEDURE — 99215 OFFICE O/P EST HI 40 MIN: CPT

## 2019-04-26 NOTE — HISTORY OF PRESENT ILLNESS
[FreeTextEntry1] : hospital follow up \par \par 79 yo Greek speaking male with PMH HTN, DMT2, CAD with stents x2 (2000), hypothyroid, depression, transferred from [facility] for NSTEMI and hypertensive urgency s/p CCU stay requiring on nitro gtt [facility] Baseline creat in sep 2018 was 2.0. admitted with a creat of 3.0 likely in the setting of malignant hypertension which got worse due to RAMIRO. Discharged with a creat of 3.4 on 4/8/2019. \par \par Today he is accompanied by his daughter who is helping with translating as well. he has had intermittent chest discomfort and saw Dr. Gomes who was concerned and wanted to follow up closely to monitor for need for staged PCI. Also started on Lasix 80 mg in am and 40 mg in pm for volume overload. \par \par otherwise, he is eating well and has fair energy. \par \par ROS \par - No changes in urination, no blood in urine \par CVS- No chest pain, no palpitations \par GI - No diarrhea, no abdominal pain \par all other systems reviewed in detail and were negative except as above

## 2019-04-26 NOTE — ASSESSMENT
[FreeTextEntry1] : 77 yo male with ckd 4 likely sec to diabetic nephropathy, HTN,  CAD with stents x2 (2000), hypothyroid, depression, transferred from [facility] for NSTEMI and hypertensive urgency s/p CCU stay requiring on nitro gtt s/p PCI with MACI x 2 to pLAD 4/1 with ALDEN \par \par CKD 4- Likely sec to diabetic nephropathy. serological work up negative. ALDEN likely sec to malignant hypertension and worsened by RAMIRO during cardiac cath. last eGFR 15 ml/min. no acute dialysis needs but I explained to him and her daughter that if he undergoes repeat cardiac cath ( which he needs due to persistent chest discomfort/ ischemia), he may end up on dialysis. He and his daughter understand this. Even referral for access placement ( provided he wishes to undergo dialysis) will need cardiac clearance. I communicated this to Dr. Gmoes. \par \par Anemia- Hb 11.5. \par \par CK-MBD- Phos/Ca WNL. PTH within target for this degree of CKD \par \par HTN- Well controlled

## 2019-04-26 NOTE — PHYSICAL EXAM
[General Appearance - Alert] : alert [General Appearance - In No Acute Distress] : in no acute distress [Sclera] : the sclera and conjunctiva were normal [PERRL With Normal Accommodation] : pupils were equal in size, round, and reactive to light [Outer Ear] : the ears and nose were normal in appearance [Hearing Threshold Finger Rub Not [place]] : hearing was normal [Examination Of The Oral Cavity] : the lips and gums were normal [Oropharynx] : the oropharynx was normal [Neck Appearance] : the appearance of the neck was normal [Neck Cervical Mass (___cm)] : no neck mass was observed [Jugular Venous Distention Increased] : there was no jugular-venous distention [Thyroid Diffuse Enlargement] : the thyroid was not enlarged [Respiration, Rhythm And Depth] : normal respiratory rhythm and effort [Exaggerated Use Of Accessory Muscles For Inspiration] : no accessory muscle use [Auscultation Breath Sounds / Voice Sounds] : lungs were clear to auscultation bilaterally [Heart Sounds] : normal S1 and S2 [Heart Sounds Gallop] : no gallops [Heart Sounds Pericardial Friction Rub] : no pericardial rub [Arterial Pulses Carotid] : carotid pulses were normal with no bruits [FreeTextEntry1] : 2+ edema  [Bowel Sounds] : normal bowel sounds [Abdomen Soft] : soft [Abdomen Tenderness] : non-tender [No CVA Tenderness] : no ~M costovertebral angle tenderness [No Spinal Tenderness] : no spinal tenderness [Abnormal Walk] : normal gait [Nail Clubbing] : no clubbing  or cyanosis of the fingernails [Musculoskeletal - Swelling] : no joint swelling seen [Skin Color & Pigmentation] : normal skin color and pigmentation [Skin Turgor] : normal skin turgor [] : no rash [Cranial Nerves] : cranial nerves 2-12 were intact [Deep Tendon Reflexes (DTR)] : deep tendon reflexes were 2+ and symmetric [Sensation] : the sensory exam was normal to light touch and pinprick [Oriented To Time, Place, And Person] : oriented to person, place, and time [Impaired Insight] : insight and judgment were intact [Affect] : the affect was normal

## 2019-04-29 DIAGNOSIS — R60.0 LOCALIZED EDEMA: ICD-10-CM

## 2019-04-29 DIAGNOSIS — R94.31 ABNORMAL ELECTROCARDIOGRAM [ECG] [EKG]: ICD-10-CM

## 2019-04-29 DIAGNOSIS — I21.4 NON-ST ELEVATION (NSTEMI) MYOCARDIAL INFARCTION: ICD-10-CM

## 2019-04-29 DIAGNOSIS — I10 ESSENTIAL (PRIMARY) HYPERTENSION: ICD-10-CM

## 2019-05-01 ENCOUNTER — APPOINTMENT (OUTPATIENT)
Dept: CARDIOLOGY | Facility: HOSPITAL | Age: 79
End: 2019-05-01

## 2019-05-01 ENCOUNTER — OUTPATIENT (OUTPATIENT)
Dept: OUTPATIENT SERVICES | Facility: HOSPITAL | Age: 79
LOS: 1 days | End: 2019-05-01
Payer: MEDICARE

## 2019-05-01 VITALS
HEART RATE: 61 BPM | HEIGHT: 71 IN | DIASTOLIC BLOOD PRESSURE: 82 MMHG | SYSTOLIC BLOOD PRESSURE: 133 MMHG | OXYGEN SATURATION: 96 %

## 2019-05-01 DIAGNOSIS — Z98.890 OTHER SPECIFIED POSTPROCEDURAL STATES: Chronic | ICD-10-CM

## 2019-05-01 DIAGNOSIS — I25.10 ATHEROSCLEROTIC HEART DISEASE OF NATIVE CORONARY ARTERY WITHOUT ANGINA PECTORIS: ICD-10-CM

## 2019-05-01 PROCEDURE — 93005 ELECTROCARDIOGRAM TRACING: CPT

## 2019-05-01 PROCEDURE — G0463: CPT

## 2019-05-02 DIAGNOSIS — E78.5 HYPERLIPIDEMIA, UNSPECIFIED: ICD-10-CM

## 2019-05-02 DIAGNOSIS — R60.0 LOCALIZED EDEMA: ICD-10-CM

## 2019-05-02 DIAGNOSIS — E11.22 TYPE 2 DIABETES MELLITUS WITH DIABETIC CHRONIC KIDNEY DISEASE: ICD-10-CM

## 2019-05-02 DIAGNOSIS — I10 ESSENTIAL (PRIMARY) HYPERTENSION: ICD-10-CM

## 2019-05-02 DIAGNOSIS — R94.31 ABNORMAL ELECTROCARDIOGRAM [ECG] [EKG]: ICD-10-CM

## 2019-05-02 NOTE — REVIEW OF SYSTEMS
[Dyspnea on exertion] : dyspnea during exertion [Chest  Pressure] : chest pressure [Negative] : Neurological [Shortness Of Breath] : no shortness of breath [Lower Ext Edema] : no extremity edema [Cough] : no cough

## 2019-05-02 NOTE — PHYSICAL EXAM
[Normal Appearance] : normal appearance [General Appearance - Well Developed] : well developed [General Appearance - Well Nourished] : well nourished [Normal Oral Mucosa] : normal oral mucosa [Normal Conjunctiva] : the conjunctiva exhibited no abnormalities [Auscultation Breath Sounds / Voice Sounds] : lungs were clear to auscultation bilaterally [Heart Rate And Rhythm] : heart rate and rhythm were normal [Heart Sounds] : normal S1 and S2 [Bowel Sounds] : normal bowel sounds [Murmurs] : no murmurs present [Abdomen Tenderness] : non-tender [] : no hepato-splenomegaly [Skin Color & Pigmentation] : normal skin color and pigmentation [Abnormal Walk] : normal gait [FreeTextEntry1] : +BLE edema; much improved

## 2019-05-02 NOTE — HISTORY OF PRESENT ILLNESS
[FreeTextEntry1] : 79 yo man w/ hx of DM2, HTN, CAD s/p PCI 2000, CKD who presented to [facility] a few weeks ago w/ an NSTEMI.  Underwent LHC on 4/2/2019 and found to have multi-vessel CAD (pLAD 100%, D1/D2 90%, pCX 80%).  Underwent MACI x 2 to pLAD.  Due to worsening renal function (Cr ~ 3-3.6), did not undergo further interventions.  \par \par Pt seen last week and was endorsing worsening exertional dyspnea/left sided chest pressure, orthopnea/PND, abd distention and leg swelling.  I placed him on oral diuretic therapy and some of his sx's/leg swelling have improved.  However still endorsing exertional left sided chest pressure. \par \par Also feels "dizzy" and daughter has noted pt to be more "lethargic/out of it".  Otherwise no other concerning sx's. \par   \par \par Cardiac Hx \par \par TTE 4/2/2019 \par  Conclusions:\par 1. Mitral valve not well visualized, probably normal.\par Minimal mitral regurgitation.\par 2. Normal trileaflet aortic valve. No aortic valve\par regurgitation seen.\par 3. Mild segmental left ventricular systolic dysfunction.\par The mid inferolateral wall, the basal  inferolateral wall,\par the basal anterolateral wall, and the mid anterolateral\par wall are hypokinetic.\par 4. Mild diastolic dysfunction (Stage I).\par 5. Normal right ventricular size and function.\par *** No previous Echo exam. \par \par LHC 4/1/2019 \par \par LM:   --  Distal left main: Angiography showed minor luminal irregularities\par with no flow limiting lesions.\par LAD:   --  Proximal LAD: There was a diffuse 100 % stenosis. This is a\par likely culprit for the patient's clinical presentation.\par --  D1: There was a tubular 90 % stenosis.\par --  D2: There was a discrete 90 % stenosis at the ostium of the vessel\par segment.\par CX:   --  Proximal circumflex: There was a discrete 80 % stenosis.\par --  OM1: Angiography showed minor luminal irregularities with no flow\par limiting lesions.\par --  OM2: Angiography showed minor luminal irregularities with no flow\par limiting lesions.\par RCA:   --  Proximal RCA: Angiography showed minor luminal irregularities\par with no flow limiting lesions.\par --  Mid RCA: Angiography showed mild atherosclerosis with no flow limiting\par lesions.\par --  RPLS: Angiography showed minor luminal irregularities with no flow\par limiting lesions.\par COMPLICATIONS: There were no complications.\par DIAGNOSTIC IMPRESSIONS: Severe CAD. Occluded LAD. Severediag and prox LCx\par disesae. No LV gram due to severe CKD.\par INTERVENTIONAL RECOMMENDATIONS: Aspirin and Brilinta

## 2019-05-02 NOTE — DISCUSSION/SUMMARY
[FreeTextEntry1] : 77 yo man w/ hx of DM2, HTN, CAD s/p PCI 2000, CKD who presented for NSTEMI s/p MACI to LAD. Still w/ lesions in the Cx/D1/D2 branches. However not intervened due to advanced CKD. \par Currently euvolemic on exam w/ ongoing anginal sx's.  ECG unchanged today as compared to last week/and hospital discharge. \par BMP obtained which showed normal K (~ 4.1) w/ renal function slightly above baseline; likely from overdiuresis.  \par Seen by nephrology and is aware that if goes for repeat LHC, there's the risk of being on RRT (renal replacement therapy) which he's willing to accept if need be.  \par \par RECS\par - cont dapt, statin, coreg, hydralazine \par - increase imdur to 60 mg daily \par - hold lasix and use it on a PRN basis only  \par - will schedule for RHC/LHC (staged PCI of LCX) w/ Dr. Hagan this week/next week\par - pt will be hydrated w/ IVF/bicarb prior to LHC procedure \par \par RTC in 4 weeks.  sooner PRN  \par \par Akira Wilson MD

## 2019-05-03 LAB
ANION GAP SERPL CALC-SCNC: 17 MMOL/L
BUN SERPL-MCNC: 79 MG/DL
CALCIUM SERPL-MCNC: 9.6 MG/DL
CHLORIDE SERPL-SCNC: 98 MMOL/L
CO2 SERPL-SCNC: 28 MMOL/L
CREAT SERPL-MCNC: 4.07 MG/DL
GLUCOSE SERPL-MCNC: 129 MG/DL
MAGNESIUM SERPL-MCNC: 2.3 MG/DL
POTASSIUM SERPL-SCNC: 4.1 MMOL/L
SODIUM SERPL-SCNC: 143 MMOL/L

## 2019-05-06 ENCOUNTER — INPATIENT (INPATIENT)
Facility: HOSPITAL | Age: 79
LOS: 0 days | Discharge: HOME | DRG: 247 | End: 2019-05-07
Attending: INTERNAL MEDICINE | Admitting: INTERNAL MEDICINE
Payer: MEDICARE

## 2019-05-06 ENCOUNTER — TRANSCRIPTION ENCOUNTER (OUTPATIENT)
Age: 79
End: 2019-05-06

## 2019-05-06 VITALS
RESPIRATION RATE: 16 BRPM | HEIGHT: 71 IN | SYSTOLIC BLOOD PRESSURE: 148 MMHG | HEART RATE: 59 BPM | WEIGHT: 248.02 LBS | TEMPERATURE: 98 F | DIASTOLIC BLOOD PRESSURE: 78 MMHG | OXYGEN SATURATION: 98 %

## 2019-05-06 DIAGNOSIS — I25.10 ATHEROSCLEROTIC HEART DISEASE OF NATIVE CORONARY ARTERY WITHOUT ANGINA PECTORIS: ICD-10-CM

## 2019-05-06 DIAGNOSIS — Z98.890 OTHER SPECIFIED POSTPROCEDURAL STATES: Chronic | ICD-10-CM

## 2019-05-06 LAB
ALBUMIN MFR SERPL ELPH: 55.5 %
ALBUMIN SERPL ELPH-MCNC: 3.6 G/DL — SIGNIFICANT CHANGE UP (ref 3.3–5)
ALBUMIN SERPL-MCNC: 3.6 G/DL
ALBUMIN/GLOB SERPL: 1.2 RATIO
ALP SERPL-CCNC: 86 U/L — SIGNIFICANT CHANGE UP (ref 40–120)
ALPHA1 GLOB MFR SERPL ELPH: 4.3 %
ALPHA1 GLOB SERPL ELPH-MCNC: 0.3 G/DL
ALPHA2 GLOB MFR SERPL ELPH: 12.9 %
ALPHA2 GLOB SERPL ELPH-MCNC: 0.8 G/DL
ALT FLD-CCNC: 16 U/L — SIGNIFICANT CHANGE UP (ref 10–45)
ANION GAP SERPL CALC-SCNC: 12 MMOL/L
ANION GAP SERPL CALC-SCNC: 12 MMOL/L — SIGNIFICANT CHANGE UP (ref 5–17)
APPEARANCE: CLEAR
AST SERPL-CCNC: 20 U/L — SIGNIFICANT CHANGE UP (ref 10–40)
B-GLOBULIN MFR SERPL ELPH: 15 %
B-GLOBULIN SERPL ELPH-MCNC: 1 G/DL
BACTERIA: NEGATIVE
BASOPHILS # BLD AUTO: 0.05 K/UL
BASOPHILS NFR BLD AUTO: 0.7 %
BILIRUB SERPL-MCNC: 0.3 MG/DL — SIGNIFICANT CHANGE UP (ref 0.2–1.2)
BILIRUBIN URINE: NEGATIVE
BLOOD URINE: NEGATIVE
BUN SERPL-MCNC: 48 MG/DL — HIGH (ref 7–23)
BUN SERPL-MCNC: 63 MG/DL
C3 SERPL-MCNC: 128 MG/DL
C4 SERPL-MCNC: 25 MG/DL
CALCIUM SERPL-MCNC: 9.3 MG/DL — SIGNIFICANT CHANGE UP (ref 8.4–10.5)
CALCIUM SERPL-MCNC: 9.5 MG/DL
CALCIUM SERPL-MCNC: 9.5 MG/DL
CHLORIDE SERPL-SCNC: 105 MMOL/L
CHLORIDE SERPL-SCNC: 109 MMOL/L — HIGH (ref 96–108)
CO2 SERPL-SCNC: 23 MMOL/L — SIGNIFICANT CHANGE UP (ref 22–31)
CO2 SERPL-SCNC: 24 MMOL/L
COLOR: YELLOW
CREAT SERPL-MCNC: 3.02 MG/DL — HIGH (ref 0.5–1.3)
CREAT SERPL-MCNC: 3.62 MG/DL
CREAT SPEC-SCNC: 108 MG/DL
CREAT/PROT UR: 3.2 RATIO
EOSINOPHIL # BLD AUTO: 0.12 K/UL
EOSINOPHIL NFR BLD AUTO: 1.6 %
GAMMA GLOB FLD ELPH-MCNC: 0.8 G/DL
GAMMA GLOB MFR SERPL ELPH: 12.3 %
GLUCOSE BLDC GLUCOMTR-MCNC: 113 MG/DL — HIGH (ref 70–99)
GLUCOSE BLDC GLUCOMTR-MCNC: 129 MG/DL — HIGH (ref 70–99)
GLUCOSE BLDC GLUCOMTR-MCNC: 155 MG/DL — HIGH (ref 70–99)
GLUCOSE QUALITATIVE U: ABNORMAL
GLUCOSE SERPL-MCNC: 133 MG/DL — HIGH (ref 70–99)
GLUCOSE SERPL-MCNC: 227 MG/DL
HBV SURFACE AG SER QL: NONREACTIVE
HCT VFR BLD CALC: 32.6 % — LOW (ref 39–50)
HCT VFR BLD CALC: 36.7 %
HCV AB SER QL: NONREACTIVE
HCV S/CO RATIO: 0.05 S/CO
HGB BLD-MCNC: 11.1 G/DL — LOW (ref 13–17)
HGB BLD-MCNC: 11.5 G/DL
HYALINE CASTS: 2 /LPF
IMM GRANULOCYTES NFR BLD AUTO: 0.4 %
INTERPRETATION SERPL IEP-IMP: NORMAL
KETONES URINE: NEGATIVE
LEUKOCYTE ESTERASE URINE: NEGATIVE
LYMPHOCYTES # BLD AUTO: 1.45 K/UL
LYMPHOCYTES NFR BLD AUTO: 19.5 %
M PROTEIN SPEC IFE-MCNC: NORMAL
MAN DIFF?: NORMAL
MCHC RBC-ENTMCNC: 31.3 GM/DL
MCHC RBC-ENTMCNC: 32.2 PG
MCHC RBC-ENTMCNC: 34.1 GM/DL — SIGNIFICANT CHANGE UP (ref 32–36)
MCHC RBC-ENTMCNC: 34.2 PG — HIGH (ref 27–34)
MCV RBC AUTO: 100 FL — SIGNIFICANT CHANGE UP (ref 80–100)
MCV RBC AUTO: 102.8 FL
MICROSCOPIC-UA: NORMAL
MONOCYTES # BLD AUTO: 0.36 K/UL
MONOCYTES NFR BLD AUTO: 4.9 %
NEUTROPHILS # BLD AUTO: 5.41 K/UL
NEUTROPHILS NFR BLD AUTO: 72.9 %
NITRITE URINE: NEGATIVE
PARATHYROID HORMONE INTACT: 205 PG/ML
PH URINE: 6
PHOSPHATE SERPL-MCNC: 3.1 MG/DL
PHOSPHOLIPASE A2 RECEPTOR ELISA: <2 RU/ML
PHOSPHOLIPASE A2 RECEPTOR IFA: NEGATIVE
PLATELET # BLD AUTO: 128 K/UL — LOW (ref 150–400)
PLATELET # BLD AUTO: 161 K/UL
POTASSIUM SERPL-MCNC: 4.3 MMOL/L — SIGNIFICANT CHANGE UP (ref 3.5–5.3)
POTASSIUM SERPL-SCNC: 4.3 MMOL/L — SIGNIFICANT CHANGE UP (ref 3.5–5.3)
POTASSIUM SERPL-SCNC: 5 MMOL/L
PROT SERPL-MCNC: 6.4 G/DL — SIGNIFICANT CHANGE UP (ref 6–8.3)
PROT SERPL-MCNC: 6.5 G/DL
PROT SERPL-MCNC: 6.5 G/DL
PROT UR-MCNC: 344 MG/DL
PROTEIN URINE: ABNORMAL
RBC # BLD: 3.25 M/UL — LOW (ref 4.2–5.8)
RBC # BLD: 3.57 M/UL
RBC # FLD: 11.6 % — SIGNIFICANT CHANGE UP (ref 10.3–14.5)
RBC # FLD: 13.5 %
RED BLOOD CELLS URINE: 0 /HPF
SODIUM SERPL-SCNC: 141 MMOL/L
SODIUM SERPL-SCNC: 144 MMOL/L — SIGNIFICANT CHANGE UP (ref 135–145)
SPECIFIC GRAVITY URINE: 1.02
SQUAMOUS EPITHELIAL CELLS: 2 /HPF
UROBILINOGEN URINE: NORMAL
WBC # BLD: 7 K/UL — SIGNIFICANT CHANGE UP (ref 3.8–10.5)
WBC # FLD AUTO: 7 K/UL — SIGNIFICANT CHANGE UP (ref 3.8–10.5)
WBC # FLD AUTO: 7.42 K/UL
WHITE BLOOD CELLS URINE: 1 /HPF

## 2019-05-06 PROCEDURE — 92978 ENDOLUMINL IVUS OCT C 1ST: CPT | Mod: 26,LC,GC

## 2019-05-06 PROCEDURE — 93010 ELECTROCARDIOGRAM REPORT: CPT | Mod: 77

## 2019-05-06 PROCEDURE — 99152 MOD SED SAME PHYS/QHP 5/>YRS: CPT | Mod: GC

## 2019-05-06 PROCEDURE — 93010 ELECTROCARDIOGRAM REPORT: CPT

## 2019-05-06 PROCEDURE — 93460 R&L HRT ART/VENTRICLE ANGIO: CPT | Mod: 26,59,GC

## 2019-05-06 PROCEDURE — 92928 PRQ TCAT PLMT NTRAC ST 1 LES: CPT | Mod: LC,GC

## 2019-05-06 RX ORDER — HYDRALAZINE HCL 50 MG
100 TABLET ORAL THREE TIMES A DAY
Qty: 0 | Refills: 0 | Status: DISCONTINUED | OUTPATIENT
Start: 2019-05-06 | End: 2019-05-07

## 2019-05-06 RX ORDER — ISOSORBIDE MONONITRATE 60 MG/1
90 TABLET, EXTENDED RELEASE ORAL DAILY
Qty: 0 | Refills: 0 | Status: DISCONTINUED | OUTPATIENT
Start: 2019-05-06 | End: 2019-05-07

## 2019-05-06 RX ORDER — ACETAMINOPHEN 500 MG
650 TABLET ORAL ONCE
Qty: 0 | Refills: 0 | Status: COMPLETED | OUTPATIENT
Start: 2019-05-06 | End: 2019-05-06

## 2019-05-06 RX ORDER — AMLODIPINE BESYLATE 2.5 MG/1
5 TABLET ORAL DAILY
Qty: 0 | Refills: 0 | Status: DISCONTINUED | OUTPATIENT
Start: 2019-05-07 | End: 2019-05-07

## 2019-05-06 RX ORDER — INSULIN LISPRO 100/ML
VIAL (ML) SUBCUTANEOUS AT BEDTIME
Qty: 0 | Refills: 0 | Status: DISCONTINUED | OUTPATIENT
Start: 2019-05-06 | End: 2019-05-07

## 2019-05-06 RX ORDER — INSULIN LISPRO 100/ML
VIAL (ML) SUBCUTANEOUS
Qty: 0 | Refills: 0 | Status: DISCONTINUED | OUTPATIENT
Start: 2019-05-06 | End: 2019-05-07

## 2019-05-06 RX ORDER — LATANOPROST 0.05 MG/ML
1 SOLUTION/ DROPS OPHTHALMIC; TOPICAL AT BEDTIME
Qty: 0 | Refills: 0 | Status: DISCONTINUED | OUTPATIENT
Start: 2019-05-06 | End: 2019-05-07

## 2019-05-06 RX ORDER — ATORVASTATIN CALCIUM 80 MG/1
80 TABLET, FILM COATED ORAL AT BEDTIME
Qty: 0 | Refills: 0 | Status: DISCONTINUED | OUTPATIENT
Start: 2019-05-06 | End: 2019-05-07

## 2019-05-06 RX ORDER — TICAGRELOR 90 MG/1
90 TABLET ORAL
Qty: 0 | Refills: 0 | Status: DISCONTINUED | OUTPATIENT
Start: 2019-05-06 | End: 2019-05-07

## 2019-05-06 RX ORDER — LEVOTHYROXINE SODIUM 125 MCG
175 TABLET ORAL DAILY
Qty: 0 | Refills: 0 | Status: DISCONTINUED | OUTPATIENT
Start: 2019-05-07 | End: 2019-05-07

## 2019-05-06 RX ORDER — SODIUM CHLORIDE 9 MG/ML
1000 INJECTION, SOLUTION INTRAVENOUS
Qty: 0 | Refills: 0 | Status: DISCONTINUED | OUTPATIENT
Start: 2019-05-06 | End: 2019-05-07

## 2019-05-06 RX ORDER — DEXTROSE 50 % IN WATER 50 %
12.5 SYRINGE (ML) INTRAVENOUS ONCE
Qty: 0 | Refills: 0 | Status: DISCONTINUED | OUTPATIENT
Start: 2019-05-06 | End: 2019-05-07

## 2019-05-06 RX ORDER — ASPIRIN/CALCIUM CARB/MAGNESIUM 324 MG
81 TABLET ORAL DAILY
Qty: 0 | Refills: 0 | Status: DISCONTINUED | OUTPATIENT
Start: 2019-05-07 | End: 2019-05-07

## 2019-05-06 RX ORDER — CARVEDILOL PHOSPHATE 80 MG/1
12.5 CAPSULE, EXTENDED RELEASE ORAL EVERY 12 HOURS
Qty: 0 | Refills: 0 | Status: DISCONTINUED | OUTPATIENT
Start: 2019-05-06 | End: 2019-05-07

## 2019-05-06 RX ORDER — DEXTROSE 50 % IN WATER 50 %
25 SYRINGE (ML) INTRAVENOUS ONCE
Qty: 0 | Refills: 0 | Status: DISCONTINUED | OUTPATIENT
Start: 2019-05-06 | End: 2019-05-07

## 2019-05-06 RX ORDER — SODIUM CHLORIDE 9 MG/ML
250 INJECTION INTRAMUSCULAR; INTRAVENOUS; SUBCUTANEOUS ONCE
Qty: 0 | Refills: 0 | Status: DISCONTINUED | OUTPATIENT
Start: 2019-05-06 | End: 2019-05-07

## 2019-05-06 RX ORDER — DEXTROSE 50 % IN WATER 50 %
15 SYRINGE (ML) INTRAVENOUS ONCE
Qty: 0 | Refills: 0 | Status: DISCONTINUED | OUTPATIENT
Start: 2019-05-06 | End: 2019-05-07

## 2019-05-06 RX ORDER — PANTOPRAZOLE SODIUM 20 MG/1
40 TABLET, DELAYED RELEASE ORAL
Qty: 0 | Refills: 0 | Status: DISCONTINUED | OUTPATIENT
Start: 2019-05-06 | End: 2019-05-07

## 2019-05-06 RX ORDER — GLUCAGON INJECTION, SOLUTION 0.5 MG/.1ML
1 INJECTION, SOLUTION SUBCUTANEOUS ONCE
Qty: 0 | Refills: 0 | Status: DISCONTINUED | OUTPATIENT
Start: 2019-05-06 | End: 2019-05-07

## 2019-05-06 RX ADMIN — Medication 100 MILLIGRAM(S): at 21:53

## 2019-05-06 RX ADMIN — Medication 650 MILLIGRAM(S): at 14:20

## 2019-05-06 RX ADMIN — Medication 650 MILLIGRAM(S): at 13:19

## 2019-05-06 RX ADMIN — CARVEDILOL PHOSPHATE 12.5 MILLIGRAM(S): 80 CAPSULE, EXTENDED RELEASE ORAL at 17:36

## 2019-05-06 RX ADMIN — Medication 1: at 17:58

## 2019-05-06 RX ADMIN — LATANOPROST 1 DROP(S): 0.05 SOLUTION/ DROPS OPHTHALMIC; TOPICAL at 21:53

## 2019-05-06 RX ADMIN — TICAGRELOR 90 MILLIGRAM(S): 90 TABLET ORAL at 17:36

## 2019-05-06 RX ADMIN — Medication 100 MILLIGRAM(S): at 15:13

## 2019-05-06 RX ADMIN — ATORVASTATIN CALCIUM 80 MILLIGRAM(S): 80 TABLET, FILM COATED ORAL at 21:53

## 2019-05-06 NOTE — H&P CARDIOLOGY - PMH
ASHD (arteriosclerotic heart disease)    CAD (coronary artery disease)    CKD (chronic kidney disease)    Depression    Diabetes    DM (diabetes mellitus)    Glaucoma    History of kidney stones    HLD (hyperlipidemia)    HTN (hypertension)    Hypothyroid    NSTEMI (non-ST elevated myocardial infarction)

## 2019-05-06 NOTE — H&P CARDIOLOGY - PSH
History of left heart catheterization (LHC)  2000  and 04/2019  History of open heart surgery  CABG x 2V

## 2019-05-06 NOTE — H&P CARDIOLOGY - HISTORY OF PRESENT ILLNESS
This is a 78 year od  male with history of HTN, HLD, DMT2 ( last A1C 6.3 on 04/2019, well managed w/o complications as per patient, PCP: Zheng Joya ), CAD with stents x2 (2000), hypothyroid, depression, recent NSTEMI 04/2019 and hypertensive urgency ( was admitted to [facility] CCU, for nitro gtt), at that time he had a LHC ( see report below) and found to have multi vessel CAD s/p x2 MACI to pLAD ( currently on Aspirin and Brilinta), however due to worsening renal function ( Creat 3-3.6) did not undergo any further interventions. Cont to have worsening exertional dyspnea with left sided chest pressure, orthopnea/PND, abd distention and leg swelling since discharge, however symptoms improved after PCIs ( f/u with cardiology clinic).  Presents here today for C, needs hydration given CKD prior to cath today, ( f/u with renal Era Pascalva [phone]). Creat on dc 3.0 on 04/3/2019.           < from: Cardiac Cath Lab - Adult (04.01.19 @ 20:16) >  CORONARY VESSELS: The coronary circulation is right dominant.  LM:   --  Distal left main: Angiography showed minor luminal irregularities  with no flow limiting lesions.  LAD:   --  Proximal LAD: There was a diffuse 100 % stenosis. This is a  likely culprit for the patient's clinical presentation.  --  D1: There was a tubular 90 % stenosis.  --  D2: There was a discrete 90 % stenosis at the ostium of the vessel  segment.  CX:   --  Proximal circumflex: There was a discrete 80 % stenosis.  --  OM1: Angiography showed minor luminal irregularities with no flow  limiting lesions.  --  OM2: Angiography showed minor luminal irregularities with no flow  limiting lesions.  RCA:   --  Proximal RCA: Angiography showed minor luminal irregularities  with no flow limiting lesions.  --  Mid RCA: Angiography showed mild atherosclerosis with no flow limiting  lesions.  --  RPLS: Angiography showed minor luminal irregularities with no flow  limiting lesions.  COMPLICATIONS: There were no complications.  DIAGNOSTIC IMPRESSIONS: Severe CAD. Occluded LAD. Severediag and prox LCx  disesae. No LV gram due to severe CKD.  INTERVENTIONAL RECOMMENDATIONS: Aspirin and Brilinta  Prepared and signed by  Toy Hagan M.D.    < end of copied text >    < from: TTE with Doppler (w/Cont) (04.02.19 @ 06:37) >  ------------------------------------------------------------------------  Dimensions:    Normal Values:  LA:     3.3    2.0 - 4.0 cm  Ao:     3.4    2.0 - 3.8 cm  SEPTUM: 1.1    0.6 - 1.2 cm  PWT:    1.1    0.6 - 1.1 cm  LVIDd:  5.8    3.0 - 5.6 cm  LVIDs:  4.3    1.8 - 4.0 cm  Derived variables:  LVMI: 120 g/m2  RWT: 0.37  Fractional short: 26 %  EF (Visual Estimate): 45-50 %  Doppler Peak Velocity (m/sec): AoV=1.4  ------------------------------------------------------------------------  Observations:  Mitral Valve: Mitral valve not well visualized, probably  normal. Minimal mitral regurgitation.  Aortic Valve/Aorta: Normal trileaflet aortic valve. Peak  transaortic valve gradient equals 8 mm Hg, estimated aortic  valve area equals 3.5 sqcm. No aortic valve regurgitation  seen. Peak left ventricular outflow tract gradient equals 7  mm Hg.  Aortic Root: 3.4 cm.  Left Atrium: Normal left atrium.  LA volume index = 25  cc/m2.  Left Ventricle: Mild segmental left ventricular systolic  dysfunction. The mid inferolateral wall, the basal  inferolateral wall, the basal anterolateral wall, and the  mid anterolateral wall are hypokinetic.  Normal left  ventricular internal dimensions and wall thicknesses. Mild  diastolic dysfunction (Stage I).  Right Heart: Normal right atrium. Normal right ventricular  size and function. Normal tricuspid valve. Minimal  tricuspid regurgitation. Normal pulmonic valve. Minimal  pulmonic regurgitation.  Pericardium/Pleura: Normal pericardium with no pericardial  effusion.  ------------------------------------------------------------------------  Conclusions:  1. Mitral valve not well visualized, probably normal.  Minimal mitral regurgitation.  2. Normal trileaflet aortic valve. No aortic valve  regurgitation seen.  3. Mild segmental left ventricular systolic dysfunction.  The mid inferolateral wall, the basal  inferolateral wall,  the basal anterolateral wall, and the mid anterolateral  wall are hypokinetic.  4. Mild diastolic dysfunction (Stage I).  5. Normal right ventricular size and function.  *** No previous Echo exam.    < end of copied text > This is a 78 year od  male with history of HTN, HLD, DMT2 ( last A1C 6.3 on 04/2019, well managed w/o complications as per patient, PCP: Zheng Joya ), CAD with stents x2 (2000), hypothyroid, depression, recent NSTEMI 04/2019 and hypertensive urgency ( was admitted to [facility] CCU, for nitro gtt), at that time he had a LHC ( see report below) and found to have multi vessel CAD s/p x2 MACI to pLAD ( currently on Aspirin and Brilinta), however due to worsening renal function ( Creat 3-3.6) did not undergo any further interventions. Cont to have worsening exertional dyspnea with left sided chest pressure, orthopnea/PND, abd distention and leg swelling since discharge, however symptoms improved after PCIs ( f/u with cardiology clinic).  Presents here today for C, needs hydration given CKD prior to cath today, ( f/u with renal Era Pascalva [phone]). Creat on dc 3.0 on 04/3/2019.           < from: Cardiac Cath Lab - Adult (04.01.19 @ 20:16) >  CORONARY VESSELS: The coronary circulation is right dominant.  LM:   --  Distal left main: Angiography showed minor luminal irregularities  with no flow limiting lesions.  LAD:   --  Proximal LAD: There was a diffuse 100 % stenosis. This is a  likely culprit for the patient's clinical presentation.  --  D1: There was a tubular 90 % stenosis.  --  D2: There was a discrete 90 % stenosis at the ostium of the vessel  segment.  CX:   --  Proximal circumflex: There was a discrete 80 % stenosis.  --  OM1: Angiography showed minor luminal irregularities with no flow  limiting lesions.  --  OM2: Angiography showed minor luminal irregularities with no flow  limiting lesions.  RCA:   --  Proximal RCA: Angiography showed minor luminal irregularities  with no flow limiting lesions.  --  Mid RCA: Angiography showed mild atherosclerosis with no flow limiting  lesions.  --  RPLS: Angiography showed minor luminal irregularities with no flow  limiting lesions.  COMPLICATIONS: There were no complications.  DIAGNOSTIC IMPRESSIONS: Severe CAD. Occluded LAD. Severediag and prox LCx  disesae. No LV gram due to severe CKD.  INTERVENTIONAL RECOMMENDATIONS: Aspirin and Brilinta  Prepared and signed by  Toy Hagan M.D.    < end of copied text >    < from: TTE with Doppler (w/Cont) (04.02.19 @ 06:37) >  ------------------------------------------------------------------------  Dimensions:    Normal Values:  LA:     3.3    2.0 - 4.0 cm  Ao:     3.4    2.0 - 3.8 cm  SEPTUM: 1.1    0.6 - 1.2 cm  PWT:    1.1    0.6 - 1.1 cm  LVIDd:  5.8    3.0 - 5.6 cm  LVIDs:  4.3    1.8 - 4.0 cm  Derived variables:  LVMI: 120 g/m2  RWT: 0.37  Fractional short: 26 %  EF (Visual Estimate): 45-50 %  Doppler Peak Velocity (m/sec): AoV=1.4  ------------------------------------------------------------------------  Observations:  Mitral Valve: Mitral valve not well visualized, probably  normal. Minimal mitral regurgitation.  Aortic Valve/Aorta: Normal trileaflet aortic valve. Peak  transaortic valve gradient equals 8 mm Hg, estimated aortic  valve area equals 3.5 sqcm. No aortic valve regurgitation  seen. Peak left ventricular outflow tract gradient equals 7  mm Hg.  Aortic Root: 3.4 cm.  Left Atrium: Normal left atrium.  LA volume index = 25  cc/m2.  Left Ventricle: Mild segmental left ventricular systolic  dysfunction. The mid inferolateral wall, the basal  inferolateral wall, the basal anterolateral wall, and the  mid anterolateral wall are hypokinetic.  Normal left  ventricular internal dimensions and wall thicknesses. Mild  diastolic dysfunction (Stage I).  Right Heart: Normal right atrium. Normal right ventricular  size and function. Normal tricuspid valve. Minimal  tricuspid regurgitation. Normal pulmonic valve. Minimal  pulmonic regurgitation.  Pericardium/Pleura: Normal pericardium with no pericardial  effusion.  ------------------------------------------------------------------------  Conclusions:  1. Mitral valve not well visualized, probably normal.  Minimal mitral regurgitation.  2. Normal trileaflet aortic valve. No aortic valve  regurgitation seen.  3. Mild segmental left ventricular systolic dysfunction.  The mid inferolateral wall, the basal  inferolateral wall,  the basal anterolateral wall, and the mid anterolateral  wall are hypokinetic.  4. Mild diastolic dysfunction (Stage I).  5. Normal right ventricular size and function.  *** No previous Echo exam.    as per Renal note on [employer]   "CKD 4- Likely sec to diabetic nephropathy. serological work up negative. ALDEN likely sec to malignant hypertension and worsened by RAMIRO during cardiac cath. last eGFR 15 ml/min. no acute dialysis needs but I explained to him and her daughter that if he undergoes repeat cardiac cath ( which he needs due to persistent chest discomfort/ ischemia), he may end up on dialysis. He and his daughter understand this. Even referral for access placement ( provided he wishes to undergo dialysis) will need cardiac clearance. I communicated this to Dr. Gomes".   < end of copied text > This is a 78 year od  male with history of HTN, HLD, DMT2 ( last A1C 6.3 on 04/2019, complicated by diabetic nephropathy , PCP: Zheng Joya ), CKD 4, CAD with stents x2 (2000), hypothyroid, depression, recent NSTEMI 04/2019 and hypertensive urgency ( was admitted to [facility] CCU, for nitro gtt), at that time he had a LHC ( see report below) and found to have multi vessel CAD s/p x2 MACI to pLAD ( currently on Aspirin and Brilinta), however due to worsening renal function ( Creat 3-3.6) did not undergo any further interventions. Cont to have worsening exertional dyspnea with left sided chest pressure, orthopnea/PND, abd distention and leg swelling since discharge, however symptoms improved after PCIs ( f/u with cardiology clinic).  Presents here today for R and LHC, needs hydration given CKD prior to cath today, ( f/u with renal Dr Zachary [facility] [phone]). Creat on dc 3.0 on 04/3/2019).  Presently verb mild dyspnea ( chronic)  but denies: chest pain, dizziness, palpitations, N&V. HA. + LE edema R>L. ( Lasix prn only because for elevated Creat, as per daughter).     < from: Cardiac Cath Lab - Adult (04.01.19 @ 20:16) >  CORONARY VESSELS: The coronary circulation is right dominant.  LM:   --  Distal left main: Angiography showed minor luminal irregularities  with no flow limiting lesions.  LAD:   --  Proximal LAD: There was a diffuse 100 % stenosis. This is a  likely culprit for the patient's clinical presentation.  --  D1: There was a tubular 90 % stenosis.  --  D2: There was a discrete 90 % stenosis at the ostium of the vessel  segment.  CX:   --  Proximal circumflex: There was a discrete 80 % stenosis.  --  OM1: Angiography showed minor luminal irregularities with no flow  limiting lesions.  --  OM2: Angiography showed minor luminal irregularities with no flow  limiting lesions.  RCA:   --  Proximal RCA: Angiography showed minor luminal irregularities  with no flow limiting lesions.  --  Mid RCA: Angiography showed mild atherosclerosis with no flow limiting  lesions.  --  RPLS: Angiography showed minor luminal irregularities with no flow  limiting lesions.  COMPLICATIONS: There were no complications.  DIAGNOSTIC IMPRESSIONS: Severe CAD. Occluded LAD. Severediag and prox LCx  disesae. No LV gram due to severe CKD.  INTERVENTIONAL RECOMMENDATIONS: Aspirin and Brilinta  Prepared and signed by  Toy Hagan M.D.    < end of copied text >    < from: TTE with Doppler (w/Cont) (04.02.19 @ 06:37) >  ------------------------------------------------------------------------  Dimensions:    Normal Values:  LA:     3.3    2.0 - 4.0 cm  Ao:     3.4    2.0 - 3.8 cm  SEPTUM: 1.1    0.6 - 1.2 cm  PWT:    1.1    0.6 - 1.1 cm  LVIDd:  5.8    3.0 - 5.6 cm  LVIDs:  4.3    1.8 - 4.0 cm  Derived variables:  LVMI: 120 g/m2  RWT: 0.37  Fractional short: 26 %  EF (Visual Estimate): 45-50 %  Doppler Peak Velocity (m/sec): AoV=1.4  ------------------------------------------------------------------------  Observations:  Mitral Valve: Mitral valve not well visualized, probably  normal. Minimal mitral regurgitation.  Aortic Valve/Aorta: Normal trileaflet aortic valve. Peak  transaortic valve gradient equals 8 mm Hg, estimated aortic  valve area equals 3.5 sqcm. No aortic valve regurgitation  seen. Peak left ventricular outflow tract gradient equals 7  mm Hg.  Aortic Root: 3.4 cm.  Left Atrium: Normal left atrium.  LA volume index = 25  cc/m2.  Left Ventricle: Mild segmental left ventricular systolic  dysfunction. The mid inferolateral wall, the basal  inferolateral wall, the basal anterolateral wall, and the  mid anterolateral wall are hypokinetic.  Normal left  ventricular internal dimensions and wall thicknesses. Mild  diastolic dysfunction (Stage I).  Right Heart: Normal right atrium. Normal right ventricular  size and function. Normal tricuspid valve. Minimal  tricuspid regurgitation. Normal pulmonic valve. Minimal  pulmonic regurgitation.  Pericardium/Pleura: Normal pericardium with no pericardial  effusion.  ------------------------------------------------------------------------  Conclusions:  1. Mitral valve not well visualized, probably normal.  Minimal mitral regurgitation.  2. Normal trileaflet aortic valve. No aortic valve  regurgitation seen.  3. Mild segmental left ventricular systolic dysfunction.  The mid inferolateral wall, the basal  inferolateral wall,  the basal anterolateral wall, and the mid anterolateral  wall are hypokinetic.  4. Mild diastolic dysfunction (Stage I).  5. Normal right ventricular size and function.  *** No previous Echo exam.    as per Renal note on [employer]   "CKD 4- Likely sec to diabetic nephropathy. serological work up negative. ALDEN likely sec to malignant hypertension and worsened by RAMIRO during cardiac cath. last eGFR 15 ml/min. no acute dialysis needs but I explained to him and her daughter that if he undergoes repeat cardiac cath ( which he needs due to persistent chest discomfort/ ischemia), he may end up on dialysis. He and his daughter understand this. Even referral for access placement ( provided he wishes to undergo dialysis) will need cardiac clearance. I communicated this to Dr. Gomes".   < end of copied text > This is a 78 year od  male, only speaks Romanian refuses  phone would like daughter to translate instead,  with history of HTN, HLD, DMT2 ( last A1C 6.3 on 04/2019, complicated by diabetic nephropathy , PCP: Zheng Joya ), CKD 4, CAD with stents x2 (2000), hypothyroid, depression, recent NSTEMI 04/2019 and hypertensive urgency ( was admitted to [facility] CCU, for nitro gtt), at that time he had a LHC ( see report below) and found to have multi vessel CAD s/p x2 MACI to pLAD ( currently on Aspirin and Brilinta), however due to worsening renal function ( Creat 3-3.6) did not undergo any further interventions. Cont to have worsening exertional dyspnea with left sided chest pressure, orthopnea/PND, abd distention and leg swelling since discharge, however symptoms improved after PCIs ( f/u with cardiology clinic).  Presents here today for R and LHC, needs hydration given CKD prior to cath today, ( f/u with renal Dr Zachary [facility] [phone]). Creat on dc 3.0 on 04/3/2019).  Presently verb mild dyspnea ( chronic)  but denies: chest pain, dizziness, palpitations, N&V. HA. + LE edema R>L. ( Lasix prn only because for elevated Creat, as per daughter).     < from: Cardiac Cath Lab - Adult (04.01.19 @ 20:16) >  CORONARY VESSELS: The coronary circulation is right dominant.  LM:   --  Distal left main: Angiography showed minor luminal irregularities  with no flow limiting lesions.  LAD:   --  Proximal LAD: There was a diffuse 100 % stenosis. This is a  likely culprit for the patient's clinical presentation.  --  D1: There was a tubular 90 % stenosis.  --  D2: There was a discrete 90 % stenosis at the ostium of the vessel  segment.  CX:   --  Proximal circumflex: There was a discrete 80 % stenosis.  --  OM1: Angiography showed minor luminal irregularities with no flow  limiting lesions.  --  OM2: Angiography showed minor luminal irregularities with no flow  limiting lesions.  RCA:   --  Proximal RCA: Angiography showed minor luminal irregularities  with no flow limiting lesions.  --  Mid RCA: Angiography showed mild atherosclerosis with no flow limiting  lesions.  --  RPLS: Angiography showed minor luminal irregularities with no flow  limiting lesions.  COMPLICATIONS: There were no complications.  DIAGNOSTIC IMPRESSIONS: Severe CAD. Occluded LAD. Severediag and prox LCx  disesae. No LV gram due to severe CKD.  INTERVENTIONAL RECOMMENDATIONS: Aspirin and Brilinta  Prepared and signed by  Toy Hagan M.D.    < end of copied text >    < from: TTE with Doppler (w/Cont) (04.02.19 @ 06:37) >  ------------------------------------------------------------------------  Dimensions:    Normal Values:  LA:     3.3    2.0 - 4.0 cm  Ao:     3.4    2.0 - 3.8 cm  SEPTUM: 1.1    0.6 - 1.2 cm  PWT:    1.1    0.6 - 1.1 cm  LVIDd:  5.8    3.0 - 5.6 cm  LVIDs:  4.3    1.8 - 4.0 cm  Derived variables:  LVMI: 120 g/m2  RWT: 0.37  Fractional short: 26 %  EF (Visual Estimate): 45-50 %  Doppler Peak Velocity (m/sec): AoV=1.4  ------------------------------------------------------------------------  Observations:  Mitral Valve: Mitral valve not well visualized, probably  normal. Minimal mitral regurgitation.  Aortic Valve/Aorta: Normal trileaflet aortic valve. Peak  transaortic valve gradient equals 8 mm Hg, estimated aortic  valve area equals 3.5 sqcm. No aortic valve regurgitation  seen. Peak left ventricular outflow tract gradient equals 7  mm Hg.  Aortic Root: 3.4 cm.  Left Atrium: Normal left atrium.  LA volume index = 25  cc/m2.  Left Ventricle: Mild segmental left ventricular systolic  dysfunction. The mid inferolateral wall, the basal  inferolateral wall, the basal anterolateral wall, and the  mid anterolateral wall are hypokinetic.  Normal left  ventricular internal dimensions and wall thicknesses. Mild  diastolic dysfunction (Stage I).  Right Heart: Normal right atrium. Normal right ventricular  size and function. Normal tricuspid valve. Minimal  tricuspid regurgitation. Normal pulmonic valve. Minimal  pulmonic regurgitation.  Pericardium/Pleura: Normal pericardium with no pericardial  effusion.  ------------------------------------------------------------------------  Conclusions:  1. Mitral valve not well visualized, probably normal.  Minimal mitral regurgitation.  2. Normal trileaflet aortic valve. No aortic valve  regurgitation seen.  3. Mild segmental left ventricular systolic dysfunction.  The mid inferolateral wall, the basal  inferolateral wall,  the basal anterolateral wall, and the mid anterolateral  wall are hypokinetic.  4. Mild diastolic dysfunction (Stage I).  5. Normal right ventricular size and function.  *** No previous Echo exam.    as per Renal note on [employer]   "CKD 4- Likely sec to diabetic nephropathy. serological work up negative. ALDEN likely sec to malignant hypertension and worsened by RAMIRO during cardiac cath. last eGFR 15 ml/min. no acute dialysis needs but I explained to him and her daughter that if he undergoes repeat cardiac cath ( which he needs due to persistent chest discomfort/ ischemia), he may end up on dialysis. He and his daughter understand this. Even referral for access placement ( provided he wishes to undergo dialysis) will need cardiac clearance. I communicated this to Dr. Gomes".   < end of copied text >

## 2019-05-06 NOTE — CHART NOTE - NSCHARTNOTEFT_GEN_A_CORE
Patient underwent a PCI procedure and is being admitted as they are at increased risk for major adverse cardiac and vascular events if discharged due to the following high risk characteristics:      Pre-procedure Clinical Criteria  Major: Unstable Angina    Angiographic Criteria     BLAYNE Christopher [id number]

## 2019-05-07 ENCOUNTER — TRANSCRIPTION ENCOUNTER (OUTPATIENT)
Age: 79
End: 2019-05-07

## 2019-05-07 VITALS
OXYGEN SATURATION: 97 % | DIASTOLIC BLOOD PRESSURE: 84 MMHG | HEART RATE: 70 BPM | TEMPERATURE: 98 F | SYSTOLIC BLOOD PRESSURE: 168 MMHG | RESPIRATION RATE: 17 BRPM

## 2019-05-07 LAB
ANION GAP SERPL CALC-SCNC: 12 MMOL/L — SIGNIFICANT CHANGE UP (ref 5–17)
BUN SERPL-MCNC: 46 MG/DL — HIGH (ref 7–23)
CALCIUM SERPL-MCNC: 8.6 MG/DL — SIGNIFICANT CHANGE UP (ref 8.4–10.5)
CHLORIDE SERPL-SCNC: 108 MMOL/L — SIGNIFICANT CHANGE UP (ref 96–108)
CO2 SERPL-SCNC: 19 MMOL/L — LOW (ref 22–31)
CREAT SERPL-MCNC: 2.84 MG/DL — HIGH (ref 0.5–1.3)
GLUCOSE BLDC GLUCOMTR-MCNC: 119 MG/DL — HIGH (ref 70–99)
GLUCOSE SERPL-MCNC: 145 MG/DL — HIGH (ref 70–99)
HCT VFR BLD CALC: 30.8 % — LOW (ref 39–50)
HGB BLD-MCNC: 10.5 G/DL — LOW (ref 13–17)
MCHC RBC-ENTMCNC: 34 PG — SIGNIFICANT CHANGE UP (ref 27–34)
MCHC RBC-ENTMCNC: 34.1 GM/DL — SIGNIFICANT CHANGE UP (ref 32–36)
MCV RBC AUTO: 99.7 FL — SIGNIFICANT CHANGE UP (ref 80–100)
PLATELET # BLD AUTO: 120 K/UL — LOW (ref 150–400)
POTASSIUM SERPL-MCNC: 4.3 MMOL/L — SIGNIFICANT CHANGE UP (ref 3.5–5.3)
POTASSIUM SERPL-SCNC: 4.3 MMOL/L — SIGNIFICANT CHANGE UP (ref 3.5–5.3)
RBC # BLD: 3.09 M/UL — LOW (ref 4.2–5.8)
RBC # FLD: 11.7 % — SIGNIFICANT CHANGE UP (ref 10.3–14.5)
SODIUM SERPL-SCNC: 139 MMOL/L — SIGNIFICANT CHANGE UP (ref 135–145)
WBC # BLD: 8.2 K/UL — SIGNIFICANT CHANGE UP (ref 3.8–10.5)
WBC # FLD AUTO: 8.2 K/UL — SIGNIFICANT CHANGE UP (ref 3.8–10.5)

## 2019-05-07 PROCEDURE — 82962 GLUCOSE BLOOD TEST: CPT

## 2019-05-07 PROCEDURE — C1874: CPT

## 2019-05-07 PROCEDURE — 93460 R&L HRT ART/VENTRICLE ANGIO: CPT | Mod: 59

## 2019-05-07 PROCEDURE — C1894: CPT

## 2019-05-07 PROCEDURE — C9600: CPT | Mod: LC

## 2019-05-07 PROCEDURE — C1769: CPT

## 2019-05-07 PROCEDURE — 80053 COMPREHEN METABOLIC PANEL: CPT

## 2019-05-07 PROCEDURE — 92978 ENDOLUMINL IVUS OCT C 1ST: CPT | Mod: LC

## 2019-05-07 PROCEDURE — C1887: CPT

## 2019-05-07 PROCEDURE — C1753: CPT

## 2019-05-07 PROCEDURE — C1725: CPT

## 2019-05-07 PROCEDURE — C1760: CPT

## 2019-05-07 PROCEDURE — 93005 ELECTROCARDIOGRAM TRACING: CPT

## 2019-05-07 PROCEDURE — 80048 BASIC METABOLIC PNL TOTAL CA: CPT

## 2019-05-07 PROCEDURE — 99152 MOD SED SAME PHYS/QHP 5/>YRS: CPT

## 2019-05-07 PROCEDURE — 85027 COMPLETE CBC AUTOMATED: CPT

## 2019-05-07 RX ORDER — TICAGRELOR 90 MG/1
1 TABLET ORAL
Qty: 180 | Refills: 3
Start: 2019-05-07 | End: 2020-04-30

## 2019-05-07 RX ADMIN — TICAGRELOR 90 MILLIGRAM(S): 90 TABLET ORAL at 05:31

## 2019-05-07 RX ADMIN — AMLODIPINE BESYLATE 5 MILLIGRAM(S): 2.5 TABLET ORAL at 05:31

## 2019-05-07 RX ADMIN — Medication 100 MILLIGRAM(S): at 05:31

## 2019-05-07 RX ADMIN — PANTOPRAZOLE SODIUM 40 MILLIGRAM(S): 20 TABLET, DELAYED RELEASE ORAL at 05:31

## 2019-05-07 RX ADMIN — Medication 175 MICROGRAM(S): at 05:30

## 2019-05-07 RX ADMIN — Medication 81 MILLIGRAM(S): at 05:30

## 2019-05-07 RX ADMIN — CARVEDILOL PHOSPHATE 12.5 MILLIGRAM(S): 80 CAPSULE, EXTENDED RELEASE ORAL at 05:30

## 2019-05-07 NOTE — DISCHARGE NOTE PROVIDER - NSDCCPCAREPLAN_GEN_ALL_CORE_FT
PRINCIPAL DISCHARGE DIAGNOSIS  Diagnosis: CAD (coronary artery disease)  Assessment and Plan of Treatment: Pt remains chest pain free and understands post cath discharge instructions   No heavy lifting, strenuous activity, bending, straining or unnecessary stair climbing  for 2 weeks. No sex for 1 week.  No driving for 2 days. You may shower 24 hours following procedure but avoid baths and swimming for 1 week. Check groin site for bleeding and/or swelling daily following procedure. Call your doctor/cardiologist immediately should it occur or if you have increased/persistent pain at the site. Follow up with your cardiologist in 1- 2 weeks. You may call [facility] at [phone] or [phone] after office hours and weekends  with any questions or concerns following your procedure. Take medications as prescribed.      SECONDARY DISCHARGE DIAGNOSES  Diagnosis: DM (diabetes mellitus)  Assessment and Plan of Treatment: Your hemoglobin A1C will be between 7-8   Continue to follow with your primary care MD or your endocrinologist.  Follow a heart healthy diabetic diet. If you check your fingerstick glucose at home, call your MD if it is greater than 250mg/dL on 2 occasions or less than 100mg/dL on 2 occasions. Know signs of low blood sugar, such as: dizziness, shakiness, sweating, confusion, hunger, nervousness-drink 4 ounces apple juice if occurs and call your doctor. Know early signs of high blood sugar, such as: frequent urination, increased thirst, blurry vision, fatigue, headache - call your doctor if this occurs. Follow with other practitioners to care for your diabetes, such as ophthamologist and podiatrist.    Diagnosis: HLD (hyperlipidemia)  Assessment and Plan of Treatment: Your LDL cholesterol will be less than 70mg/dL   Continue with your cholesterol medications. Eat a heart healthy diet that is low in saturated fats and salt, and includes whole grains, fruits, vegetables and lean protein; exercise regularly (consult with your physician or cardiologist first); maintain a heart healthy weight. Continue to follow with your primary physician or cardiologist for treatment goals, continue medication, have liver function testing every 3 months as anti lipid medications can cause liver irritation. If you smoke - quit (A resource to help you stop smoking is the [facility] – phone number [phone].).    Diagnosis: HTN (hypertension)  Assessment and Plan of Treatment: Your blood pressure will be controlled.   Continue with your blood pressure medications; eat a heart healthy diet with low salt diet; exercise regularly (consult with your physician or cardiologist first); maintain a heart healthy weight; if you smoke - quit (A resource to help you stop smoking is the [facility] – phone number [phone].); include healthy ways to manage stress. Continue to follow with your primary care physician or cardiologist.

## 2019-05-07 NOTE — PROGRESS NOTE ADULT - ASSESSMENT
78y old  Male who presents with a chief complaint of CAD (07 May 2019 01:20) now s/p C MACI x 1 OM via RFA access.  Pt tolerated the procedure well, site benign. Post-procedure discharge instructions discussed and questions addressed

## 2019-05-07 NOTE — DISCHARGE NOTE PROVIDER - CARE PROVIDER_API CALL
Toy Hagan (MD)  Cardiovascular Disease; Interventional Cardiology  [address] [place] [address]  Phone: [phone]  Fax: [phone]  Follow Up Time:

## 2019-05-07 NOTE — DISCHARGE NOTE PROVIDER - NSDCCPTREATMENT_GEN_ALL_CORE_FT
PRINCIPAL PROCEDURE  Procedure: Left heart catheterization  Findings and Treatment: S/p MACI x 1 OM via  RFA

## 2019-05-07 NOTE — PROGRESS NOTE ADULT - SUBJECTIVE AND OBJECTIVE BOX
78y old  Male who presents with a chief complaint of CAD (07 May 2019 01:20) now s/p LHC MACI x 1 OM via RFA access           Allergies    No Known Drug Allergies  shellfish (Vomiting)    Intolerances        Medications:  amLODIPine   Tablet 5 milliGRAM(s) Oral daily  aspirin enteric coated 81 milliGRAM(s) Oral daily  atorvastatin 80 milliGRAM(s) Oral at bedtime  carvedilol 12.5 milliGRAM(s) Oral every 12 hours  dextrose 40% Gel 15 Gram(s) Oral once PRN  dextrose 5%. 1000 milliLiter(s) IV Continuous <Continuous>  dextrose 50% Injectable 12.5 Gram(s) IV Push once  dextrose 50% Injectable 25 Gram(s) IV Push once  dextrose 50% Injectable 25 Gram(s) IV Push once  glucagon  Injectable 1 milliGRAM(s) IntraMuscular once PRN  hydrALAZINE 100 milliGRAM(s) Oral three times a day  insulin lispro (HumaLOG) corrective regimen sliding scale   SubCutaneous three times a day before meals  insulin lispro (HumaLOG) corrective regimen sliding scale   SubCutaneous at bedtime  isosorbide   mononitrate ER Tablet (IMDUR) 90 milliGRAM(s) Oral daily  latanoprost 0.005% Ophthalmic Solution 1 Drop(s) Both EYES at bedtime  levothyroxine 175 MICROGram(s) Oral daily  pantoprazole    Tablet 40 milliGRAM(s) Oral before breakfast  sodium chloride 0.9% Bolus 250 milliLiter(s) IV Bolus once  ticagrelor 90 milliGRAM(s) Oral two times a day      Vitals:  T(C): 36.9 (19 @ 21:49), Max: 36.9 (19 @ 21:49)  HR: 69 (19 @ 21:49) (59 - 85)  BP: 162/84 (19 @ 22:30) (128/69 - 174/69)  BP(mean): 101 (19 @ 08:12) (101 - 101)  RR: 16 (19 @ 21:49) (16 - 18)  SpO2: 99% (19 @ 21:49) (95% - 99%)  Wt(kg): --  Daily Height in cm: 180.34 (06 May 2019 11:20)    Daily Weight in k.4 (06 May 2019 11:20)  I&O's Summary    06 May 2019 07:01  -  07 May 2019 03:53  --------------------------------------------------------  IN: 320 mL / OUT: 400 mL / NET: -80 mL          Physical Exam:  Procedural Access Site: RFA No hematoma, Non-tender to palpation, 2+ pulse, No bruit, No Ecchymosis  Respiratory: Clear to auscultation bilaterally  Psychiatry: AAOx3, Mood & affect appropriate  Skin: No rashes, No ecchymoses, No cyanosis        139  |  108  |  46<H>  ----------------------------<  145<H>  4.3   |  19<L>  |  2.84<H>    Ca    8.6      07 May 2019 00:19    TPro  6.4  /  Alb  3.6  /  TBili  0.3  /  DBili  x   /  AST  20  /  ALT  16  /  AlkPhos  86  -        Interpretation of Telemetry:

## 2019-05-07 NOTE — DISCHARGE NOTE NURSING/CASE MANAGEMENT/SOCIAL WORK - NSDCDPATPORTLINK_GEN_ALL_CORE
You can access the [facility] Portal, offered by [facility], by registering with the following website: [email or web address]

## 2019-05-07 NOTE — DISCHARGE NOTE PROVIDER - NSDCFUADDAPPT_GEN_ALL_CORE_FT
Follow up with your cardiologist in 1 week.  Do not stop your Asprin or Brilinta unless instructed to do so by your cardiologist.

## 2019-05-07 NOTE — DISCHARGE NOTE PROVIDER - HOSPITAL COURSE
This is a 78 year od  male, only speaks Syriac refuses  phone would like daughter to translate instead,  with history of HTN, HLD, DMT2 ( last A1C 6.3 on 04/2019, complicated by diabetic nephropathy , PCP: Zheng Joya ), CKD 4, CAD with stents x2 (2000), hypothyroid, depression, recent NSTEMI 04/2019 and hypertensive urgency ( was admitted to [facility] CCU, for nitro gtt), at that time he had a LHC ( see report below) and found to have multi vessel CAD s/p x2 MACI to pLAD ( currently on Aspirin and Brilinta), however due to worsening renal function ( Creat 3-3.6) did not undergo any further interventions. Cont to have worsening exertional dyspnea with left sided chest pressure, orthopnea/PND, abd distention and leg swelling since discharge, however symptoms improved after PCIs ( f/u with cardiology clinic).  Presents here today for R and LHC, needs hydration given CKD prior to cath today, ( f/u with renal , [facility] [phone]). Creat on dc 3.0 on 04/3/2019).  Presently verb mild dyspnea ( chronic)  but denies: chest pain, dizziness, palpitations, N&V. HA. + LE edema R>L. ( Lasix prn only because for elevated Creat, as per daughter). Pt is now s/p LHC MACI x 1 OM1 via RFA access. Pt tolerated the procedure well, site benign. Post-procedure discharge instructions discussed and questions addressed

## 2019-05-15 ENCOUNTER — APPOINTMENT (OUTPATIENT)
Dept: CARDIOLOGY | Facility: HOSPITAL | Age: 79
End: 2019-05-15

## 2019-05-15 ENCOUNTER — OUTPATIENT (OUTPATIENT)
Dept: OUTPATIENT SERVICES | Facility: HOSPITAL | Age: 79
LOS: 1 days | End: 2019-05-15
Payer: MEDICARE

## 2019-05-15 VITALS — DIASTOLIC BLOOD PRESSURE: 70 MMHG | SYSTOLIC BLOOD PRESSURE: 124 MMHG | HEART RATE: 71 BPM | OXYGEN SATURATION: 95 %

## 2019-05-15 DIAGNOSIS — I25.10 ATHEROSCLEROTIC HEART DISEASE OF NATIVE CORONARY ARTERY WITHOUT ANGINA PECTORIS: ICD-10-CM

## 2019-05-15 DIAGNOSIS — Z98.890 OTHER SPECIFIED POSTPROCEDURAL STATES: Chronic | ICD-10-CM

## 2019-05-15 PROBLEM — N18.9 CHRONIC KIDNEY DISEASE, UNSPECIFIED: Chronic | Status: ACTIVE | Noted: 2019-05-06

## 2019-05-15 PROBLEM — I21.4 NON-ST ELEVATION (NSTEMI) MYOCARDIAL INFARCTION: Chronic | Status: ACTIVE | Noted: 2019-05-06

## 2019-05-15 PROCEDURE — G0463: CPT

## 2019-05-16 NOTE — DISCUSSION/SUMMARY
[FreeTextEntry1] : 79 yo man w/ hx of DM2, HTN, CAD s/p PCI 2000, CKD who presented for NSTEMI s/p MACI x 2 to LAD. Staged PCI of OM1 on 5/6/2019.  Still w/ lesions in the D1/D2 branches. \par RHC w/ normal filling pressures \par Hypovolemic on exam today \par \par RECS\par - cont dapt, statin, coreg, hydralazine \par - cont imdur 60 mg daily \par - d/c lasix for now \par - encouraged PO hydration \par \par Optimized from a cardiac standpoint.  No objections to proceeding w/ vein mapping for RRT in the future if needed.  \par \par \par RTC in 4-6 weeks. sooner PRN \par \par Akira Wilson MD.

## 2019-05-16 NOTE — REVIEW OF SYSTEMS
[Feeling Fatigued] : feeling fatigued [Shortness Of Breath] : no shortness of breath [Dyspnea on exertion] : dyspnea during exertion [Chest  Pressure] : chest pressure [Cough] : no cough [Lower Ext Edema] : no extremity edema [Excessive Thirst] : polydipsia [Negative] : Neurological

## 2019-05-16 NOTE — PHYSICAL EXAM
[Normal Appearance] : normal appearance [General Appearance - Well Developed] : well developed [General Appearance - Well Nourished] : well nourished [Normal Conjunctiva] : the conjunctiva exhibited no abnormalities [Normal Oral Mucosa] : normal oral mucosa [Auscultation Breath Sounds / Voice Sounds] : lungs were clear to auscultation bilaterally [Heart Rate And Rhythm] : heart rate and rhythm were normal [Heart Sounds] : normal S1 and S2 [Murmurs] : no murmurs present [Bowel Sounds] : normal bowel sounds [Abdomen Tenderness] : non-tender [] : no hepato-splenomegaly [Abnormal Walk] : normal gait [FreeTextEntry1] : +BLE edema; much improved.  Delayed capillary refill [Skin Color & Pigmentation] : normal skin color and pigmentation

## 2019-05-16 NOTE — HISTORY OF PRESENT ILLNESS
[FreeTextEntry1] : 79 yo man w/ hx of DM2, HTN, CAD s/p PCI 2000, CKD who presented to [facility] on 4/2/19 w/ NSTEMI and  found to have multi-vessel CAD (pLAD 100%, D1/D2 90%, OM1 80%).  Underwent MACI x 2 to pLAD at the time.  Due to worsening renal function (Cr ~ 3-3.6), did not undergo further interventions at the time.  \par \par Seen in clinic as a follow and was still endorsing exertional dyspnea/angina.  Underwent repeat LHC on 5/6/2019 and underwent OM1 PCI.  Since then, patient's chest pain/dyspnea has essentially resolved.  \par \par Had a brief episode of chest pain (lasted seconds) this morning; however no orthopnea/PND, abd distention and leg swelling.  Pt's daughter noticed pt's feet to be more swollen and re-started him on PO lasix; however noted that pt felt more dizzy/dehydrated, thus became concerned and brought him back to clinic for further evaluation.  \par \par No other new issues addressed.  \par \par Cardiac Hx \par \par [facility] 5/6/2019 . MACI to OM1 \par \par RHC \par \par RA 6\par RV 39/12\par PA 38/14/21\par PCWP 9 \par PA 63\par CO 6.36/CI 2.75 \par SVR 1182 \par \par TTE 4/2/2019 \par  Conclusions:\par 1. Mitral valve not well visualized, probably normal.\par Minimal mitral regurgitation.\par 2. Normal trileaflet aortic valve. No aortic valve\par regurgitation seen.\par 3. Mild segmental left ventricular systolic dysfunction.\par The mid inferolateral wall, the basal  inferolateral wall,\par the basal anterolateral wall, and the mid anterolateral\par wall are hypokinetic.\par 4. Mild diastolic dysfunction (Stage I).\par 5. Normal right ventricular size and function.\par *** No previous Echo exam. \par \par [facility] 4/1/2019 \par \par LM:   --  Distal left main: Angiography showed minor luminal irregularities\par with no flow limiting lesions.\par LAD:   --  Proximal LAD: There was a diffuse 100 % stenosis. This is a\par likely culprit for the patient's clinical presentation.\par --  D1: There was a tubular 90 % stenosis.\par --  D2: There was a discrete 90 % stenosis at the ostium of the vessel\par segment.\par CX:   --  Proximal circumflex: There was a discrete 80 % stenosis.\par --  OM1: Angiography showed minor luminal irregularities with no flow\par limiting lesions.\par --  OM2: Angiography showed minor luminal irregularities with no flow\par limiting lesions.\par RCA:   --  Proximal RCA: Angiography showed minor luminal irregularities\par with no flow limiting lesions.\par --  Mid RCA: Angiography showed mild atherosclerosis with no flow limiting\par lesions.\par --  RPLS: Angiography showed minor luminal irregularities with no flow\par limiting lesions.\par COMPLICATIONS: There were no complications.\par DIAGNOSTIC IMPRESSIONS: Severe CAD. Occluded LAD. Severediag and prox LCx\par disesae. No LV gram due to severe CKD.\par INTERVENTIONAL RECOMMENDATIONS: Aspirin and Brilinta

## 2019-05-17 ENCOUNTER — CHART COPY (OUTPATIENT)
Age: 79
End: 2019-05-17

## 2019-05-17 DIAGNOSIS — I10 ESSENTIAL (PRIMARY) HYPERTENSION: ICD-10-CM

## 2019-05-17 DIAGNOSIS — E78.5 HYPERLIPIDEMIA, UNSPECIFIED: ICD-10-CM

## 2019-05-17 DIAGNOSIS — E11.22 TYPE 2 DIABETES MELLITUS WITH DIABETIC CHRONIC KIDNEY DISEASE: ICD-10-CM

## 2019-05-17 DIAGNOSIS — R94.31 ABNORMAL ELECTROCARDIOGRAM [ECG] [EKG]: ICD-10-CM

## 2019-05-17 LAB
ANION GAP SERPL CALC-SCNC: 17 MMOL/L
BUN SERPL-MCNC: 69 MG/DL
CALCIUM SERPL-MCNC: 9.2 MG/DL
CHLORIDE SERPL-SCNC: 104 MMOL/L
CO2 SERPL-SCNC: 23 MMOL/L
CREAT SERPL-MCNC: 4.57 MG/DL
GLUCOSE SERPL-MCNC: 151 MG/DL
MAGNESIUM SERPL-MCNC: 2.3 MG/DL
POTASSIUM SERPL-SCNC: 4.3 MMOL/L
SODIUM SERPL-SCNC: 144 MMOL/L

## 2019-05-19 ENCOUNTER — CHART COPY (OUTPATIENT)
Age: 79
End: 2019-05-19

## 2019-05-22 ENCOUNTER — APPOINTMENT (OUTPATIENT)
Dept: CARDIOLOGY | Facility: HOSPITAL | Age: 79
End: 2019-05-22

## 2019-05-22 LAB
ANION GAP SERPL CALC-SCNC: 15 MMOL/L
BUN SERPL-MCNC: 53 MG/DL
CALCIUM SERPL-MCNC: 9.6 MG/DL
CHLORIDE SERPL-SCNC: 106 MMOL/L
CO2 SERPL-SCNC: 22 MMOL/L
CREAT SERPL-MCNC: 3.02 MG/DL
GLUCOSE SERPL-MCNC: 112 MG/DL
POTASSIUM SERPL-SCNC: 4.6 MMOL/L
SODIUM SERPL-SCNC: 143 MMOL/L

## 2019-06-01 ENCOUNTER — CHART COPY (OUTPATIENT)
Age: 79
End: 2019-06-01

## 2019-06-02 LAB
ANION GAP SERPL CALC-SCNC: 13 MMOL/L
BUN SERPL-MCNC: 52 MG/DL
CALCIUM SERPL-MCNC: 9.3 MG/DL
CHLORIDE SERPL-SCNC: 104 MMOL/L
CO2 SERPL-SCNC: 23 MMOL/L
CREAT SERPL-MCNC: 3.4 MG/DL
GLUCOSE SERPL-MCNC: 143 MG/DL
MAGNESIUM SERPL-MCNC: 2.4 MG/DL
POTASSIUM SERPL-SCNC: 4.8 MMOL/L
SODIUM SERPL-SCNC: 140 MMOL/L

## 2019-06-12 ENCOUNTER — APPOINTMENT (OUTPATIENT)
Dept: CARDIOLOGY | Facility: HOSPITAL | Age: 79
End: 2019-06-12

## 2019-06-12 ENCOUNTER — OUTPATIENT (OUTPATIENT)
Dept: OUTPATIENT SERVICES | Facility: HOSPITAL | Age: 79
LOS: 1 days | End: 2019-06-12
Payer: MEDICARE

## 2019-06-12 VITALS
SYSTOLIC BLOOD PRESSURE: 180 MMHG | HEART RATE: 60 BPM | OXYGEN SATURATION: 97 % | WEIGHT: 239 LBS | HEIGHT: 71 IN | BODY MASS INDEX: 33.46 KG/M2 | DIASTOLIC BLOOD PRESSURE: 93 MMHG

## 2019-06-12 DIAGNOSIS — Z98.890 OTHER SPECIFIED POSTPROCEDURAL STATES: Chronic | ICD-10-CM

## 2019-06-12 DIAGNOSIS — I25.10 ATHEROSCLEROTIC HEART DISEASE OF NATIVE CORONARY ARTERY WITHOUT ANGINA PECTORIS: ICD-10-CM

## 2019-06-12 LAB
CHOLEST SERPL-MCNC: 135 MG/DL
CHOLEST/HDLC SERPL: 2.9 RATIO
HDLC SERPL-MCNC: 47 MG/DL
LDLC SERPL CALC-MCNC: 61 MG/DL
TRIGL SERPL-MCNC: 137 MG/DL

## 2019-06-12 PROCEDURE — G0463: CPT

## 2019-06-13 NOTE — DISCUSSION/SUMMARY
[FreeTextEntry1] : 77 yo man w/ hx of DM2, HTN, CAD s/p PCI 2000, CKD who presented for NSTEMI on 4/2019 s/p MACI x 2 to LAD. Staged PCI of OM1 on 5/6/2019. Still w/ lesions in the D1/D2 branches, however anginal sx's have resolved.  RHC 5/6/2019 w/ normal filling pressures and adequate cardiac output.  No evidence of elevated filling pressures or volume overload on exam. Thus leg swelling likely due to venous insufficiency.\par \par Hypertensive today, which is likely due to the fact that he did not take his BP meds as he ate something that did not "settle well in his stomach".    \par \par RECS\par - cont dapt, statin, coreg 12.5 mg BID, hydralazine 100 mg TID \par - cont imdur 60 mg daily\par - instructed daughter to keep a log of his BP readings and bring them to clinic next visit.  That way I can have an idea of how to uptitrate his medications \par - not a candidate for aldactone due to Cr. > 2.5 \par - would like to start him on an ace-i, however would like to have renal's input for this \par - Also recommended daily compression stockings for his leg swelling \par \par Optimized from a cardiac standpoint. No objections to proceeding w/ vein mapping for RRT in the future if needed. \par \par \par RTC in 1 month for BP follow up and medication uptitration. sooner PRN \par \par Akira Wilson MD.

## 2019-06-13 NOTE — PHYSICAL EXAM
[Normal Appearance] : normal appearance [General Appearance - Well Developed] : well developed [General Appearance - Well Nourished] : well nourished [Normal Conjunctiva] : the conjunctiva exhibited no abnormalities [Normal Oral Mucosa] : normal oral mucosa [Auscultation Breath Sounds / Voice Sounds] : lungs were clear to auscultation bilaterally [Heart Rate And Rhythm] : heart rate and rhythm were normal [Heart Sounds] : normal S1 and S2 [Murmurs] : no murmurs present [Bowel Sounds] : normal bowel sounds [Abdomen Tenderness] : non-tender [] : no hepato-splenomegaly [Abnormal Walk] : normal gait [Skin Color & Pigmentation] : normal skin color and pigmentation [FreeTextEntry1] : +BLE edema; unchanged

## 2019-06-13 NOTE — HISTORY OF PRESENT ILLNESS
[FreeTextEntry1] : 77 yo man w/ hx of DM2, HTN, CAD s/p PCI 2000, CKD stage IV who presented to [facility] on 4/2/19 w/ NSTEMI and  found to have multi-vessel CAD (pLAD 100%, D1/D2 90%, OM1 80%).  Underwent MACI x 2 to pLAD at the time.  Due to worsening renal function (Cr ~ 3-3.6), did not undergo further interventions at the time.  \par \par Seen in clinic as a follow and was still endorsing exertional dyspnea/angina.  Underwent repeat LHC on 5/6/2019 and underwent OM1 PCI.  Since then, patient's chest pain/dyspnea has essentially resolved.  \par \par Pt's daughter continues to notice occasional leg swelling, which usually worsens at the end of day when patient stands for a prolonged period of time and improves whenever he raises his legs.  She has given him lasix on a PRN basis, and has noted some improvement.  \par \par Continues to smoke (uses a pipe) behind his daughter's back.  States that he's been cutting down and feels like he doesn't need nicotine patches/gum.  \par \par Otherwise no CP, dyspnea, orthopnea or PND. \par \par Cardiac Hx \par \par C 5/6/2019 . MACI to OM1 \par \par C 5/6/2019\par \par RA 6\par RV 39/12\par PA 38/14/21\par PCWP 9 \par PA 63\par CO 6.36/CI 2.75 \par SVR 1182 \par \par TTE 4/2/2019 \par  Conclusions:\par 1. Mitral valve not well visualized, probably normal.\par Minimal mitral regurgitation.\par 2. Normal trileaflet aortic valve. No aortic valve\par regurgitation seen.\par 3. Mild segmental left ventricular systolic dysfunction.\par The mid inferolateral wall, the basal  inferolateral wall,\par the basal anterolateral wall, and the mid anterolateral\par wall are hypokinetic.\par 4. Mild diastolic dysfunction (Stage I).\par 5. Normal right ventricular size and function.\par *** No previous Echo exam. \par \par [facility] 4/1/2019 \par \par LM:   --  Distal left main: Angiography showed minor luminal irregularities\par with no flow limiting lesions.\par LAD:   --  Proximal LAD: There was a diffuse 100 % stenosis. This is a\par likely culprit for the patient's clinical presentation.\par --  D1: There was a tubular 90 % stenosis.\par --  D2: There was a discrete 90 % stenosis at the ostium of the vessel\par segment.\par CX:   --  Proximal circumflex: There was a discrete 80 % stenosis.\par --  OM1: Angiography showed minor luminal irregularities with no flow\par limiting lesions.\par --  OM2: Angiography showed minor luminal irregularities with no flow\par limiting lesions.\par RCA:   --  Proximal RCA: Angiography showed minor luminal irregularities\par with no flow limiting lesions.\par --  Mid RCA: Angiography showed mild atherosclerosis with no flow limiting\par lesions.\par --  RPLS: Angiography showed minor luminal irregularities with no flow\par limiting lesions.\par COMPLICATIONS: There were no complications.\par DIAGNOSTIC IMPRESSIONS: Severe CAD. Occluded LAD. Severediag and prox LCx\par disesae. No LV gram due to severe CKD.\par INTERVENTIONAL RECOMMENDATIONS: Aspirin and Brilinta

## 2019-06-14 DIAGNOSIS — I10 ESSENTIAL (PRIMARY) HYPERTENSION: ICD-10-CM

## 2019-06-14 DIAGNOSIS — E11.22 TYPE 2 DIABETES MELLITUS WITH DIABETIC CHRONIC KIDNEY DISEASE: ICD-10-CM

## 2019-07-31 ENCOUNTER — OUTPATIENT (OUTPATIENT)
Dept: OUTPATIENT SERVICES | Facility: HOSPITAL | Age: 79
LOS: 1 days | End: 2019-07-31
Payer: MEDICARE

## 2019-07-31 ENCOUNTER — APPOINTMENT (OUTPATIENT)
Dept: CARDIOLOGY | Facility: HOSPITAL | Age: 79
End: 2019-07-31

## 2019-07-31 ENCOUNTER — NON-APPOINTMENT (OUTPATIENT)
Age: 79
End: 2019-07-31

## 2019-07-31 VITALS — DIASTOLIC BLOOD PRESSURE: 76 MMHG | HEART RATE: 62 BPM | SYSTOLIC BLOOD PRESSURE: 134 MMHG

## 2019-07-31 DIAGNOSIS — I25.10 ATHEROSCLEROTIC HEART DISEASE OF NATIVE CORONARY ARTERY WITHOUT ANGINA PECTORIS: ICD-10-CM

## 2019-07-31 DIAGNOSIS — Z98.890 OTHER SPECIFIED POSTPROCEDURAL STATES: Chronic | ICD-10-CM

## 2019-07-31 PROCEDURE — 93005 ELECTROCARDIOGRAM TRACING: CPT

## 2019-07-31 PROCEDURE — G0463: CPT

## 2019-07-31 NOTE — REVIEW OF SYSTEMS
[Feeling Fatigued] : not feeling fatigued [Shortness Of Breath] : no shortness of breath [Chest  Pressure] : no chest pressure [Dyspnea on exertion] : not dyspnea during exertion [Excessive Thirst] : no polydipsia [Lower Ext Edema] : no extremity edema [Cough] : no cough [Negative] : Psychiatric

## 2019-07-31 NOTE — DISCUSSION/SUMMARY
[FreeTextEntry1] : 77 yo man w/ hx of obstructive CAD s/p recent PCI 4-5/2019 (pLAD, OM1) CKD stage IV, ICMO (EF 45-50%) who presents for follow up.  Doing quite well.  Euvolemic on exam w/ normal RHC on 5/2019. Thus leg swelling likely due to venous insufficiency.    \par \par RECS\par - cont dapt, statin, coreg 12.5 mg BID, hydralazine 100 mg TID \par - decrease imdur to 30 mg daily.  Start Lisinopril 5 mg daily as pt has some degree of LV dysfunction in the setting of recent MI\par - obtain BMP in 2 days to monitor K level \par - not a candidate for aldactone due to Cr. > 2.5 \par - instructed daughter to get compression stockings and refrain from diuretics as pt is euvolemic \par \par Will f/u with Nephrology.  He's optimized from a cardiac standpoint. No objections to proceeding w/ vein mapping for RRT in the future if needed.  \par \par \par RTC in 4 weeks for BP f/u and medication uptitration. sooner PRN \par \par Akira Wilson MD.

## 2019-07-31 NOTE — HISTORY OF PRESENT ILLNESS
[FreeTextEntry1] : 77 yo man w/ hx of obstructive CAD (s/p PCI 2000, NSTEMI 4/2019 s/p PCI x 2 to LAD and staged PCI to OM1 on 5/2019), ICMO (EF 45-50%, normal RV), CKD stage IV (B/L Scr 3-4.2), DM2 (A1c 6.3 on 4/2019), HTN, who presents for follow up.  \par \par Doing much better.  Chest pain has resolved.  Quit smoking.  Still w/ occasional leg swelling which usually worsens at the end of day when patient stands for a prolonged period of time and improves whenever he raises his legs. Daughter still waiting to order compression stockings.  \par \par Otherwise no other sx's. \par \par Cardiac Hx \par \par C 5/6/2019 . MACI to OM1 \par \par C 5/6/2019\par \par RA 6\par RV 39/12\par PA 38/14/21\par PCWP 9 \par PA 63\par CO 6.36/CI 2.75 \par SVR 1182 \par \par TTE 4/2/2019 \par  Conclusions:\par 1. Mitral valve not well visualized, probably normal.\par Minimal mitral regurgitation.\par 2. Normal trileaflet aortic valve. No aortic valve\par regurgitation seen.\par 3. Mild segmental left ventricular systolic dysfunction.\par The mid inferolateral wall, the basal  inferolateral wall,\par the basal anterolateral wall, and the mid anterolateral\par wall are hypokinetic.\par 4. Mild diastolic dysfunction (Stage I).\par 5. Normal right ventricular size and function.\par *** No previous Echo exam. \par \par [facility] 4/1/2019 \par \par LM:   --  Distal left main: Angiography showed minor luminal irregularities\par with no flow limiting lesions.\par LAD:   --  Proximal LAD: There was a diffuse 100 % stenosis. This is a\par likely culprit for the patient's clinical presentation.\par --  D1: There was a tubular 90 % stenosis.\par --  D2: There was a discrete 90 % stenosis at the ostium of the vessel\par segment.\par CX:   --  Proximal circumflex: There was a discrete 80 % stenosis.\par --  OM1: Angiography showed minor luminal irregularities with no flow\par limiting lesions.\par --  OM2: Angiography showed minor luminal irregularities with no flow\par limiting lesions.\par RCA:   --  Proximal RCA: Angiography showed minor luminal irregularities\par with no flow limiting lesions.\par --  Mid RCA: Angiography showed mild atherosclerosis with no flow limiting\par lesions.\par --  RPLS: Angiography showed minor luminal irregularities with no flow\par limiting lesions.\par COMPLICATIONS: There were no complications.\par DIAGNOSTIC IMPRESSIONS: Severe CAD. Occluded LAD. Severediag and prox LCx\par disesae. No LV gram due to severe CKD.\par INTERVENTIONAL RECOMMENDATIONS: Aspirin and Brilinta

## 2019-07-31 NOTE — PHYSICAL EXAM
[General Appearance - Well Developed] : well developed [Normal Appearance] : normal appearance [General Appearance - Well Nourished] : well nourished [Normal Conjunctiva] : the conjunctiva exhibited no abnormalities [Normal Oral Mucosa] : normal oral mucosa [Auscultation Breath Sounds / Voice Sounds] : lungs were clear to auscultation bilaterally [Heart Rate And Rhythm] : heart rate and rhythm were normal [Heart Sounds] : normal S1 and S2 [Murmurs] : no murmurs present [Bowel Sounds] : normal bowel sounds [Abdomen Tenderness] : non-tender [Abnormal Walk] : normal gait [] : no hepato-splenomegaly [FreeTextEntry1] : +BLE edema; unchanged  [Skin Color & Pigmentation] : normal skin color and pigmentation

## 2019-08-02 ENCOUNTER — APPOINTMENT (OUTPATIENT)
Dept: NEPHROLOGY | Facility: CLINIC | Age: 79
End: 2019-08-02
Payer: MEDICARE

## 2019-08-02 VITALS
SYSTOLIC BLOOD PRESSURE: 162 MMHG | WEIGHT: 246.91 LBS | BODY MASS INDEX: 34.57 KG/M2 | OXYGEN SATURATION: 96 % | HEIGHT: 71 IN | HEART RATE: 62 BPM | DIASTOLIC BLOOD PRESSURE: 96 MMHG

## 2019-08-02 DIAGNOSIS — I10 ESSENTIAL (PRIMARY) HYPERTENSION: ICD-10-CM

## 2019-08-02 DIAGNOSIS — R60.0 LOCALIZED EDEMA: ICD-10-CM

## 2019-08-02 DIAGNOSIS — E78.5 HYPERLIPIDEMIA, UNSPECIFIED: ICD-10-CM

## 2019-08-02 DIAGNOSIS — E11.22 TYPE 2 DIABETES MELLITUS WITH DIABETIC CHRONIC KIDNEY DISEASE: ICD-10-CM

## 2019-08-02 PROCEDURE — 99215 OFFICE O/P EST HI 40 MIN: CPT

## 2019-08-02 NOTE — PHYSICAL EXAM
[General Appearance - In No Acute Distress] : in no acute distress [General Appearance - Alert] : alert [PERRL With Normal Accommodation] : pupils were equal in size, round, and reactive to light [Sclera] : the sclera and conjunctiva were normal [Hearing Threshold Finger Rub Not [place]] : hearing was normal [Outer Ear] : the ears and nose were normal in appearance [Examination Of The Oral Cavity] : the lips and gums were normal [Neck Appearance] : the appearance of the neck was normal [Jugular Venous Distention Increased] : there was no jugular-venous distention [Neck Cervical Mass (___cm)] : no neck mass was observed [Thyroid Diffuse Enlargement] : the thyroid was not enlarged [Respiration, Rhythm And Depth] : normal respiratory rhythm and effort [Exaggerated Use Of Accessory Muscles For Inspiration] : no accessory muscle use [Heart Sounds] : normal S1 and S2 [Auscultation Breath Sounds / Voice Sounds] : lungs were clear to auscultation bilaterally [Heart Sounds Gallop] : no gallops [Arterial Pulses Carotid] : carotid pulses were normal with no bruits [Bowel Sounds] : normal bowel sounds [Edema] : there was no peripheral edema [Abdomen Soft] : soft [Abdomen Tenderness] : non-tender [No CVA Tenderness] : no ~M costovertebral angle tenderness [Abnormal Walk] : normal gait [Nail Clubbing] : no clubbing  or cyanosis of the fingernails [No Spinal Tenderness] : no spinal tenderness [Skin Color & Pigmentation] : normal skin color and pigmentation [Musculoskeletal - Swelling] : no joint swelling seen [Skin Turgor] : normal skin turgor [] : no rash [Cranial Nerves] : cranial nerves 2-12 were intact [Skin Lesions] : no skin lesions [Deep Tendon Reflexes (DTR)] : deep tendon reflexes were 2+ and symmetric [Oriented To Time, Place, And Person] : oriented to person, place, and time [Affect] : the affect was normal [Impaired Insight] : insight and judgment were intact [Mood] : the mood was normal

## 2019-08-02 NOTE — ASSESSMENT
[FreeTextEntry1] : 77 yo male with ckd 4 likely sec to diabetic nephropathy, HTN, CAD with stents x2 (2000), hypothyroid, depression, transferred from [facility] for NSTEMI and hypertensive urgency s/p CCU stay requiring on nitro gtt s/p PCI with MACI x 2 to pLAD 4/1 with ALDEN \par \par CKD 4- Likely sec to diabetic nephropathy. serological work up negative. ALDEN likely sec to malignant hypertension and worsened by RAMIRO during cardiac cath. he was recently started on Lisinopril and his eGFR dropped from 22> 17 ml/min. Will d/w Dr. Wilson to stop it in advanced kidney disease. No dialysis needs at this time. Will arrange for vein mapping. \par \par anemia- last hb was 11.5. will check cbc next viist ( he already had labs this morning) \par \par htn- well controlled.

## 2019-08-02 NOTE — HISTORY OF PRESENT ILLNESS
[FreeTextEntry1] : follow up CKD \par \par 77 yo Iranian speaking male with PMH HTN, DMT2, CAD with stents x2 (2000), hypothyroid, depression, transferred from [facility] for NSTEMI and hypertensive urgency s/p CCU stay requiring [facility] nitro gtt [facility] Baseline creat in sep 2018 was 2.0. admitted with a creat of 3.0 likely in the setting of malignant hypertension which got worse due to RAMIRO. Discharged with a creat of 3.4 on 4/8/2019 and has beeni in the 3s range. He had a \par \par Since his last visit, he had a cardiac cath >  [facility] 5/6/2019 . MACI to OM1 \par \par C 5/6/2019\par \par RA 6\par RV 39/12\par PA 38/14/21\par PCWP 9 \par PA 63\par CO 6.36/CI 2.75 \par SVR 1182 \par \par started on lisinopril 5 a day recently\par \par per his daughter, he feels well. He is eating well and denies any nausea/ vomiting. \par \par ROS \par  - No changes in urination, no blood in urine \par CVS- No chest pain, no shortness of breath \par all other systems reviewed in detail and were negative except as above \par

## 2019-08-07 ENCOUNTER — CHART COPY (OUTPATIENT)
Age: 79
End: 2019-08-07

## 2019-08-07 LAB
ANION GAP SERPL CALC-SCNC: 16 MMOL/L
BUN SERPL-MCNC: 49 MG/DL
CALCIUM SERPL-MCNC: 9.3 MG/DL
CHLORIDE SERPL-SCNC: 105 MMOL/L
CO2 SERPL-SCNC: 21 MMOL/L
CREAT SERPL-MCNC: 3.37 MG/DL
GLUCOSE SERPL-MCNC: 123 MG/DL
POTASSIUM SERPL-SCNC: 4.1 MMOL/L
SODIUM SERPL-SCNC: 142 MMOL/L

## 2019-09-04 ENCOUNTER — APPOINTMENT (OUTPATIENT)
Dept: CARDIOLOGY | Facility: HOSPITAL | Age: 79
End: 2019-09-04

## 2019-09-04 ENCOUNTER — OUTPATIENT (OUTPATIENT)
Dept: OUTPATIENT SERVICES | Facility: HOSPITAL | Age: 79
LOS: 1 days | End: 2019-09-04
Payer: MEDICARE

## 2019-09-04 VITALS
HEIGHT: 60 IN | SYSTOLIC BLOOD PRESSURE: 132 MMHG | DIASTOLIC BLOOD PRESSURE: 76 MMHG | BODY MASS INDEX: 42.41 KG/M2 | WEIGHT: 216 LBS | HEART RATE: 63 BPM | OXYGEN SATURATION: 96 %

## 2019-09-04 DIAGNOSIS — I25.10 ATHEROSCLEROTIC HEART DISEASE OF NATIVE CORONARY ARTERY WITHOUT ANGINA PECTORIS: ICD-10-CM

## 2019-09-04 DIAGNOSIS — Z98.890 OTHER SPECIFIED POSTPROCEDURAL STATES: Chronic | ICD-10-CM

## 2019-09-04 DIAGNOSIS — F17.200 NICOTINE DEPENDENCE, UNSPECIFIED, UNCOMPLICATED: ICD-10-CM

## 2019-09-04 PROCEDURE — G0463: CPT

## 2019-09-04 RX ORDER — PHENYLEPHRINE HCL 10 MG
7 TABLET ORAL DAILY
Qty: 30 | Refills: 2 | Status: ACTIVE | COMMUNITY
Start: 2019-09-04 | End: 1900-01-01

## 2019-09-04 NOTE — PHYSICAL EXAM
[General Appearance - Well Developed] : well developed [Normal Appearance] : normal appearance [General Appearance - Well Nourished] : well nourished [Normal Conjunctiva] : the conjunctiva exhibited no abnormalities [Normal Oral Mucosa] : normal oral mucosa [Auscultation Breath Sounds / Voice Sounds] : lungs were clear to auscultation bilaterally [Heart Rate And Rhythm] : heart rate and rhythm were normal [Heart Sounds] : normal S1 and S2 [Murmurs] : no murmurs present [Abdomen Tenderness] : non-tender [Bowel Sounds] : normal bowel sounds [Abnormal Walk] : normal gait [] : no hepato-splenomegaly [FreeTextEntry1] : +BLE edema; much improved [Skin Color & Pigmentation] : normal skin color and pigmentation

## 2019-09-04 NOTE — DISCUSSION/SUMMARY
[FreeTextEntry1] : 79 yo man w/ hx of DM2, HTN, CAD s/p multiple PCIs (last MACI x 2 to LAD for NSTEMI followed by Staged PCI to OM1 on 5/2019), CKD IV (3.2-4) who presentes for follow up.  \par Pt euvolemic w/ improved BP.  Tolerating Ace-i well.  \par \par RECS\par - cont dapt, statin, coreg 12.5 mg BID, hydralazine 100 mg TID, imdur 30 mg daily\par - cont lisinopril 5 mg daily \par - not a candidate for aldactone due to Cr. > 2.5 \par - obtain BMP to evaluate K/Cr.  Spoke w/ nephro service () who's in agreement w/ ace-i therapy.  If Cr/K stable, will uptitrate ace-i and wean off imdur.  However if labs worsen, will d/c ace-i.  \par - cont compression stockings for his leg swelling \par - nicotine patches as this will help w/ smoking cessation\par \par RTC in 8 weeks sooner PRN \par \par Akira Wilson MD.

## 2019-09-04 NOTE — REVIEW OF SYSTEMS
[Feeling Fatigued] : not feeling fatigued [Shortness Of Breath] : no shortness of breath [Dyspnea on exertion] : not dyspnea during exertion [Chest  Pressure] : no chest pressure [Lower Ext Edema] : no extremity edema [Cough] : no cough [Excessive Thirst] : no polydipsia [Negative] : Psychiatric

## 2019-09-04 NOTE — HISTORY OF PRESENT ILLNESS
[FreeTextEntry1] : 77 yo man w/ hx of obstructive CAD (s/p PCI 2000, NSTEMI 4/2019 s/p PCI x 2 to LAD and staged PCI to OM1 on 5/2019), ICMO (EF 45-50%, normal RV), CKD stage IV (B/L Scr 3-4.2), DM2 (A1c 6.3 on 4/2019), HTN, who presents for follow up.  \par \par Doing much better.  Chest pain has resolved.  Still smoking.  Otherwise no other sx's. \par \par Cardiac Hx \par \par [facility] 5/6/2019 . MACI to OM1 \par \par [facility] 5/6/2019\par \par RA 6\par RV 39/12\par PA 38/14/21\par PCWP 9 \par PA 63\par CO 6.36/CI 2.75 \par SVR 1182 \par \par TTE 4/2/2019 \par  Conclusions:\par 1. Mitral valve not well visualized, probably normal.\par Minimal mitral regurgitation.\par 2. Normal trileaflet aortic valve. No aortic valve\par regurgitation seen.\par 3. Mild segmental left ventricular systolic dysfunction.\par The mid inferolateral wall, the basal  inferolateral wall,\par the basal anterolateral wall, and the mid anterolateral\par wall are hypokinetic.\par 4. Mild diastolic dysfunction (Stage I).\par 5. Normal right ventricular size and function.\par *** No previous Echo exam. \par \par [facility] 4/1/2019 \par \par LM:   --  Distal left main: Angiography showed minor luminal irregularities\par with no flow limiting lesions.\par LAD:   --  Proximal LAD: There was a diffuse 100 % stenosis. This is a\par likely culprit for the patient's clinical presentation.\par --  D1: There was a tubular 90 % stenosis.\par --  D2: There was a discrete 90 % stenosis at the ostium of the vessel\par segment.\par CX:   --  Proximal circumflex: There was a discrete 80 % stenosis.\par --  OM1: Angiography showed minor luminal irregularities with no flow\par limiting lesions.\par --  OM2: Angiography showed minor luminal irregularities with no flow\par limiting lesions.\par RCA:   --  Proximal RCA: Angiography showed minor luminal irregularities\par with no flow limiting lesions.\par --  Mid RCA: Angiography showed mild atherosclerosis with no flow limiting\par lesions.\par --  RPLS: Angiography showed minor luminal irregularities with no flow\par limiting lesions.\par COMPLICATIONS: There were no complications.\par DIAGNOSTIC IMPRESSIONS: Severe CAD. Occluded LAD. Severe diag and prox LCx\par disesae. No LV gram due to severe CKD.\par INTERVENTIONAL RECOMMENDATIONS: Aspirin and Brilinta

## 2019-09-05 DIAGNOSIS — F17.200 NICOTINE DEPENDENCE, UNSPECIFIED, UNCOMPLICATED: ICD-10-CM

## 2019-09-05 DIAGNOSIS — I10 ESSENTIAL (PRIMARY) HYPERTENSION: ICD-10-CM

## 2019-09-05 DIAGNOSIS — I87.2 VENOUS INSUFFICIENCY (CHRONIC) (PERIPHERAL): ICD-10-CM

## 2019-09-05 DIAGNOSIS — E78.5 HYPERLIPIDEMIA, UNSPECIFIED: ICD-10-CM

## 2019-09-05 DIAGNOSIS — E11.22 TYPE 2 DIABETES MELLITUS WITH DIABETIC CHRONIC KIDNEY DISEASE: ICD-10-CM

## 2019-09-05 LAB
ANION GAP SERPL CALC-SCNC: 14 MMOL/L
BUN SERPL-MCNC: 51 MG/DL
CALCIUM SERPL-MCNC: 9.6 MG/DL
CHLORIDE SERPL-SCNC: 106 MMOL/L
CO2 SERPL-SCNC: 22 MMOL/L
CREAT SERPL-MCNC: 3.37 MG/DL
ESTIMATED AVERAGE GLUCOSE: 123 MG/DL
GLUCOSE SERPL-MCNC: 105 MG/DL
HBA1C MFR BLD HPLC: 5.9 %
POTASSIUM SERPL-SCNC: 4.5 MMOL/L
SODIUM SERPL-SCNC: 142 MMOL/L

## 2019-10-16 ENCOUNTER — OUTPATIENT (OUTPATIENT)
Dept: OUTPATIENT SERVICES | Facility: HOSPITAL | Age: 79
LOS: 1 days | End: 2019-10-16
Payer: MEDICARE

## 2019-10-16 ENCOUNTER — APPOINTMENT (OUTPATIENT)
Dept: CARDIOLOGY | Facility: HOSPITAL | Age: 79
End: 2019-10-16

## 2019-10-16 VITALS
HEART RATE: 72 BPM | DIASTOLIC BLOOD PRESSURE: 77 MMHG | HEIGHT: 60 IN | SYSTOLIC BLOOD PRESSURE: 142 MMHG | OXYGEN SATURATION: 98 %

## 2019-10-16 DIAGNOSIS — I25.10 ATHEROSCLEROTIC HEART DISEASE OF NATIVE CORONARY ARTERY WITHOUT ANGINA PECTORIS: ICD-10-CM

## 2019-10-16 DIAGNOSIS — Z98.890 OTHER SPECIFIED POSTPROCEDURAL STATES: Chronic | ICD-10-CM

## 2019-10-16 PROCEDURE — G0463: CPT

## 2019-10-16 NOTE — DISCUSSION/SUMMARY
[FreeTextEntry1] : 79 yo man w/ hx of DM2, HTN, CAD s/p PCI 2000, CKD who presented for NSTEMI on 4/2019 s/p MACI x 2 to LAD. Staged PCI of OM1 on 5/6/2019.  Doing much better; w/ no anginal sx's. \par \par Still not optimized on his BP despite multiple anti-hypertensives.  \par \par RECS\par - cont dapt, statin, hydralazine 100 mg TID, lisinopril 5 mg daily \par - increase coreg to 25 mg BID; d/c imdur \par - instructed daughter to keep a log of his BP readings and bring them to clinic next visit.  That way I can have an idea of how to uptitrate his medications \par - not a candidate for aldactone due to Cr. > 2.5 \par - cont compression stockings for his leg swelling; which has improved \par \par RTC in 3-4 weeks for BP follow up and medication uptitration. sooner PRN \par \par Akira Wilson MD.

## 2019-10-16 NOTE — PHYSICAL EXAM
[General Appearance - Well Developed] : well developed [Normal Appearance] : normal appearance [General Appearance - Well Nourished] : well nourished [Normal Conjunctiva] : the conjunctiva exhibited no abnormalities [Normal Oral Mucosa] : normal oral mucosa [Auscultation Breath Sounds / Voice Sounds] : lungs were clear to auscultation bilaterally [Heart Rate And Rhythm] : heart rate and rhythm were normal [Heart Sounds] : normal S1 and S2 [Murmurs] : no murmurs present [Bowel Sounds] : normal bowel sounds [Abdomen Tenderness] : non-tender [] : no hepato-splenomegaly [Abnormal Walk] : normal gait [FreeTextEntry1] : no BLE edema  [Skin Color & Pigmentation] : normal skin color and pigmentation

## 2019-10-16 NOTE — HISTORY OF PRESENT ILLNESS
[FreeTextEntry1] : 79 yo man w/ hx of obstructive CAD (s/p PCI 2000, NSTEMI 4/2019 s/p PCI x 2 to LAD and staged PCI to OM1 on 5/2019), ICMO (EF 45-50%, normal RV), CKD stage IV (B/L Scr 3-4.2), DM2 (A1c 6.3 on 4/2019), HTN, who presents for follow up.  \par \par Doing much better.  Quit smoking; occasional nicotine patch usage.  Otherwise no other sx's. \par \par Cardiac Hx \par \par [facility] 5/6/2019 . MACI to OM1 \par \par [facility] 5/6/2019\par \par RA 6\par RV 39/12\par PA 38/14/21\par PCWP 9 \par PA 63\par CO 6.36/CI 2.75 \par SVR 1182 \par \par TTE 4/2/2019 \par  Conclusions:\par 1. Mitral valve not well visualized, probably normal.\par Minimal mitral regurgitation.\par 2. Normal trileaflet aortic valve. No aortic valve\par regurgitation seen.\par 3. Mild segmental left ventricular systolic dysfunction.\par The mid inferolateral wall, the basal  inferolateral wall,\par the basal anterolateral wall, and the mid anterolateral\par wall are hypokinetic.\par 4. Mild diastolic dysfunction (Stage I).\par 5. Normal right ventricular size and function.\par *** No previous Echo exam. \par \par [facility] 4/1/2019 \par \par LM:   --  Distal left main: Angiography showed minor luminal irregularities\par with no flow limiting lesions.\par LAD:   --  Proximal LAD: There was a diffuse 100 % stenosis. This is a\par likely culprit for the patient's clinical presentation.\par --  D1: There was a tubular 90 % stenosis.\par --  D2: There was a discrete 90 % stenosis at the ostium of the vessel\par segment.\par CX:   --  Proximal circumflex: There was a discrete 80 % stenosis.\par --  OM1: Angiography showed minor luminal irregularities with no flow\par limiting lesions.\par --  OM2: Angiography showed minor luminal irregularities with no flow\par limiting lesions.\par RCA:   --  Proximal RCA: Angiography showed minor luminal irregularities\par with no flow limiting lesions.\par --  Mid RCA: Angiography showed mild atherosclerosis with no flow limiting\par lesions.\par --  RPLS: Angiography showed minor luminal irregularities with no flow\par limiting lesions.\par COMPLICATIONS: There were no complications.\par DIAGNOSTIC IMPRESSIONS: Severe CAD. Occluded LAD. Severe diag and prox LCx\par disesae. No LV gram due to severe CKD.\par INTERVENTIONAL RECOMMENDATIONS: Aspirin and Brilinta

## 2019-10-17 DIAGNOSIS — I10 ESSENTIAL (PRIMARY) HYPERTENSION: ICD-10-CM

## 2019-10-17 DIAGNOSIS — E11.22 TYPE 2 DIABETES MELLITUS WITH DIABETIC CHRONIC KIDNEY DISEASE: ICD-10-CM

## 2019-10-17 DIAGNOSIS — I25.5 ISCHEMIC CARDIOMYOPATHY: ICD-10-CM

## 2019-10-29 ENCOUNTER — APPOINTMENT (OUTPATIENT)
Dept: NEPHROLOGY | Facility: CLINIC | Age: 79
End: 2019-10-29
Payer: MEDICARE

## 2019-10-29 VITALS
BODY MASS INDEX: 37.59 KG/M2 | WEIGHT: 248 LBS | SYSTOLIC BLOOD PRESSURE: 151 MMHG | HEART RATE: 63 BPM | HEIGHT: 68 IN | OXYGEN SATURATION: 98 % | DIASTOLIC BLOOD PRESSURE: 85 MMHG

## 2019-10-29 PROCEDURE — 99215 OFFICE O/P EST HI 40 MIN: CPT

## 2019-10-30 LAB
25(OH)D3 SERPL-MCNC: 26 NG/ML
ALBUMIN SERPL ELPH-MCNC: 3.8 G/DL
ANION GAP SERPL CALC-SCNC: 13 MMOL/L
APPEARANCE: CLEAR
BACTERIA: NEGATIVE
BASOPHILS # BLD AUTO: 0.04 K/UL
BASOPHILS NFR BLD AUTO: 0.5 %
BILIRUBIN URINE: NEGATIVE
BLOOD URINE: NEGATIVE
BUN SERPL-MCNC: 48 MG/DL
CALCIUM SERPL-MCNC: 8.7 MG/DL
CHLORIDE SERPL-SCNC: 110 MMOL/L
CO2 SERPL-SCNC: 19 MMOL/L
COLOR: YELLOW
CREAT SERPL-MCNC: 3.1 MG/DL
CREAT SPEC-SCNC: 92 MG/DL
CREAT/PROT UR: 2.5 RATIO
EOSINOPHIL # BLD AUTO: 0.21 K/UL
EOSINOPHIL NFR BLD AUTO: 2.5 %
FERRITIN SERPL-MCNC: 120 NG/ML
GLUCOSE QUALITATIVE U: NEGATIVE
GLUCOSE SERPL-MCNC: 144 MG/DL
HCT VFR BLD CALC: 33 %
HGB BLD-MCNC: 10.3 G/DL
HYALINE CASTS: 1 /LPF
IMM GRANULOCYTES NFR BLD AUTO: 0.1 %
IRON SATN MFR SERPL: 25 %
IRON SERPL-MCNC: 61 UG/DL
KETONES URINE: NEGATIVE
LEUKOCYTE ESTERASE URINE: NEGATIVE
LYMPHOCYTES # BLD AUTO: 2.54 K/UL
LYMPHOCYTES NFR BLD AUTO: 30 %
MAN DIFF?: NORMAL
MCHC RBC-ENTMCNC: 31.2 GM/DL
MCHC RBC-ENTMCNC: 32.1 PG
MCV RBC AUTO: 102.8 FL
MICROSCOPIC-UA: NORMAL
MONOCYTES # BLD AUTO: 0.68 K/UL
MONOCYTES NFR BLD AUTO: 8 %
NEUTROPHILS # BLD AUTO: 4.98 K/UL
NEUTROPHILS NFR BLD AUTO: 58.9 %
NITRITE URINE: NEGATIVE
PH URINE: 6.5
PHOSPHATE SERPL-MCNC: 3.1 MG/DL
PLATELET # BLD AUTO: 150 K/UL
POTASSIUM SERPL-SCNC: 4.3 MMOL/L
PROT UR-MCNC: 233 MG/DL
PROTEIN URINE: ABNORMAL
RBC # BLD: 3.21 M/UL
RBC # FLD: 14.5 %
RED BLOOD CELLS URINE: 3 /HPF
SODIUM SERPL-SCNC: 142 MMOL/L
SPECIFIC GRAVITY URINE: 1.02
SQUAMOUS EPITHELIAL CELLS: 1 /HPF
TIBC SERPL-MCNC: 241 UG/DL
UIBC SERPL-MCNC: 180 UG/DL
UROBILINOGEN URINE: NORMAL
WBC # FLD AUTO: 8.46 K/UL
WHITE BLOOD CELLS URINE: 1 /HPF

## 2019-12-04 ENCOUNTER — NON-APPOINTMENT (OUTPATIENT)
Age: 79
End: 2019-12-04

## 2019-12-04 ENCOUNTER — OUTPATIENT (OUTPATIENT)
Dept: OUTPATIENT SERVICES | Facility: HOSPITAL | Age: 79
LOS: 1 days | End: 2019-12-04
Payer: MEDICARE

## 2019-12-04 ENCOUNTER — APPOINTMENT (OUTPATIENT)
Dept: CARDIOLOGY | Facility: HOSPITAL | Age: 79
End: 2019-12-04

## 2019-12-04 VITALS
DIASTOLIC BLOOD PRESSURE: 78 MMHG | HEIGHT: 68 IN | HEART RATE: 67 BPM | SYSTOLIC BLOOD PRESSURE: 148 MMHG | OXYGEN SATURATION: 98 %

## 2019-12-04 DIAGNOSIS — Z98.890 OTHER SPECIFIED POSTPROCEDURAL STATES: Chronic | ICD-10-CM

## 2019-12-04 DIAGNOSIS — I25.10 ATHEROSCLEROTIC HEART DISEASE OF NATIVE CORONARY ARTERY WITHOUT ANGINA PECTORIS: ICD-10-CM

## 2019-12-04 PROCEDURE — 93005 ELECTROCARDIOGRAM TRACING: CPT

## 2019-12-04 PROCEDURE — G0463: CPT

## 2019-12-04 NOTE — PHYSICAL EXAM
[General Appearance - Well Developed] : well developed [General Appearance - Well Nourished] : well nourished [Normal Appearance] : normal appearance [Normal Conjunctiva] : the conjunctiva exhibited no abnormalities [Normal Oral Mucosa] : normal oral mucosa [Heart Sounds] : normal S1 and S2 [Heart Rate And Rhythm] : heart rate and rhythm were normal [Auscultation Breath Sounds / Voice Sounds] : lungs were clear to auscultation bilaterally [Murmurs] : no murmurs present [] : no hepato-splenomegaly [Abdomen Tenderness] : non-tender [Bowel Sounds] : normal bowel sounds [FreeTextEntry1] : no BLE edema  [Abnormal Walk] : normal gait [Skin Color & Pigmentation] : normal skin color and pigmentation

## 2019-12-04 NOTE — HISTORY OF PRESENT ILLNESS
[FreeTextEntry1] : 77 yo man w/ hx of obstructive CAD (s/p PCI 2000, NSTEMI 4/2019 s/p PCI x 2 to LAD and staged PCI to OM1 on 5/2019), ICM (EF 45-50%, normal RV), CKD stage IV (B/L Scr 3-4.2), DM2 (A1c 6.3 on 4/2019), HTN, who presents for follow up.  \par \par Doing much better.  However BP still elevated (SBP 140s-150s at home).  Otherwise no other sx's. \par \par Cardiac Hx \par \par [facility] 5/6/2019 . MACI to OM1 \par \par [facility] 5/6/2019\par \par RA 6\par RV 39/12\par PA 38/14/21\par PCWP 9 \par PA 63\par CO 6.36/CI 2.75 \par SVR 1182 \par \par TTE 4/2/2019 \par  Conclusions:\par 1. Mitral valve not well visualized, probably normal.\par Minimal mitral regurgitation.\par 2. Normal trileaflet aortic valve. No aortic valve\par regurgitation seen.\par 3. Mild segmental left ventricular systolic dysfunction.\par The mid inferolateral wall, the basal  inferolateral wall,\par the basal anterolateral wall, and the mid anterolateral\par wall are hypokinetic.\par 4. Mild diastolic dysfunction (Stage I).\par 5. Normal right ventricular size and function.\par *** No previous Echo exam. \par \par [facility] 4/1/2019 \par \par LM:   --  Distal left main: Angiography showed minor luminal irregularities\par with no flow limiting lesions.\par LAD:   --  Proximal LAD: There was a diffuse 100 % stenosis. This is a\par likely culprit for the patient's clinical presentation.\par --  D1: There was a tubular 90 % stenosis.\par --  D2: There was a discrete 90 % stenosis at the ostium of the vessel\par segment.\par CX:   --  Proximal circumflex: There was a discrete 80 % stenosis.\par --  OM1: Angiography showed minor luminal irregularities with no flow\par limiting lesions.\par --  OM2: Angiography showed minor luminal irregularities with no flow\par limiting lesions.\par RCA:   --  Proximal RCA: Angiography showed minor luminal irregularities\par with no flow limiting lesions.\par --  Mid RCA: Angiography showed mild atherosclerosis with no flow limiting\par lesions.\par --  RPLS: Angiography showed minor luminal irregularities with no flow\par limiting lesions.\par COMPLICATIONS: There were no complications.\par DIAGNOSTIC IMPRESSIONS: Severe CAD. Occluded LAD. Severe diag and prox LCx\par disesae. No LV gram due to severe CKD.\par INTERVENTIONAL RECOMMENDATIONS: Aspirin and Brilinta

## 2019-12-04 NOTE — REVIEW OF SYSTEMS
[Feeling Fatigued] : not feeling fatigued [Dyspnea on exertion] : not dyspnea during exertion [Chest  Pressure] : no chest pressure [Shortness Of Breath] : no shortness of breath [Lower Ext Edema] : no extremity edema [Cough] : no cough [Excessive Thirst] : no polydipsia [Negative] : Psychiatric

## 2019-12-04 NOTE — DISCUSSION/SUMMARY
[FreeTextEntry1] : 77 yo man w/ hx of DM2, HTN, CAD (s/p first MI 2000, MI 4/2019 s/p PCI x 2 to LAD, staged PCI to OM1 5/2019), CKD stage 3 (GFR 30s; Scr 3-3.3) who presents for follow up.  Doing quite well; however still hypertensive. \par \par RECS\par - cont dapt, statin, coreg 25 mg BID, hydralazine 100 mg TID \par - increase lisinopril to 10 mg daily\par - obtain BMP to evaluate renal function and K next week\par - instructed daughter to keep a log of his BP readings and bring them to clinic next visit.  \par - not a candidate for aldactone due to Cr. > 2.5 \par \par RTC in 2 weeks for BP follow up and medication uptitration. sooner PRN \par \par Akira Wilson MD.

## 2019-12-06 DIAGNOSIS — E11.22 TYPE 2 DIABETES MELLITUS WITH DIABETIC CHRONIC KIDNEY DISEASE: ICD-10-CM

## 2019-12-06 DIAGNOSIS — E78.5 HYPERLIPIDEMIA, UNSPECIFIED: ICD-10-CM

## 2019-12-06 DIAGNOSIS — I25.5 ISCHEMIC CARDIOMYOPATHY: ICD-10-CM

## 2019-12-06 DIAGNOSIS — I10 ESSENTIAL (PRIMARY) HYPERTENSION: ICD-10-CM

## 2019-12-13 LAB
ANION GAP SERPL CALC-SCNC: 12 MMOL/L
BUN SERPL-MCNC: 44 MG/DL
CALCIUM SERPL-MCNC: 9.6 MG/DL
CHLORIDE SERPL-SCNC: 106 MMOL/L
CO2 SERPL-SCNC: 27 MMOL/L
CREAT SERPL-MCNC: 3.33 MG/DL
GLUCOSE SERPL-MCNC: 101 MG/DL
POTASSIUM SERPL-SCNC: 4.1 MMOL/L
SODIUM SERPL-SCNC: 145 MMOL/L

## 2019-12-18 ENCOUNTER — APPOINTMENT (OUTPATIENT)
Dept: CARDIOLOGY | Facility: HOSPITAL | Age: 79
End: 2019-12-18

## 2020-01-28 ENCOUNTER — APPOINTMENT (OUTPATIENT)
Dept: NEPHROLOGY | Facility: CLINIC | Age: 80
End: 2020-01-28
Payer: MEDICARE

## 2020-01-28 VITALS
DIASTOLIC BLOOD PRESSURE: 50 MMHG | WEIGHT: 233.69 LBS | BODY MASS INDEX: 35.53 KG/M2 | OXYGEN SATURATION: 97 % | SYSTOLIC BLOOD PRESSURE: 104 MMHG | HEART RATE: 65 BPM

## 2020-01-28 PROCEDURE — 99215 OFFICE O/P EST HI 40 MIN: CPT

## 2020-01-29 ENCOUNTER — APPOINTMENT (OUTPATIENT)
Dept: CARDIOLOGY | Facility: HOSPITAL | Age: 80
End: 2020-01-29

## 2020-01-29 ENCOUNTER — OUTPATIENT (OUTPATIENT)
Dept: OUTPATIENT SERVICES | Facility: HOSPITAL | Age: 80
LOS: 1 days | End: 2020-01-29
Payer: MEDICARE

## 2020-01-29 VITALS
HEIGHT: 68 IN | SYSTOLIC BLOOD PRESSURE: 130 MMHG | DIASTOLIC BLOOD PRESSURE: 67 MMHG | HEART RATE: 65 BPM | OXYGEN SATURATION: 98 %

## 2020-01-29 DIAGNOSIS — Z98.890 OTHER SPECIFIED POSTPROCEDURAL STATES: Chronic | ICD-10-CM

## 2020-01-29 DIAGNOSIS — I25.10 ATHEROSCLEROTIC HEART DISEASE OF NATIVE CORONARY ARTERY WITHOUT ANGINA PECTORIS: ICD-10-CM

## 2020-01-29 PROCEDURE — G0463: CPT

## 2020-01-29 NOTE — REVIEW OF SYSTEMS
[Negative] : Psychiatric [Feeling Fatigued] : not feeling fatigued [Shortness Of Breath] : no shortness of breath [Dyspnea on exertion] : not dyspnea during exertion [Chest  Pressure] : no chest pressure [Cough] : no cough [Lower Ext Edema] : no extremity edema [Excessive Thirst] : no polydipsia

## 2020-01-29 NOTE — HISTORY OF PRESENT ILLNESS
[FreeTextEntry1] : 77 yo man w/ hx of obstructive CAD (s/p PCI 2000, NSTEMI 4/2019 s/p PCI x 2 to LAD and staged PCI to OM1 on 5/2019), ICM (EF 45-50%, normal RV), CKD stage IV (B/L Scr 3-4.2), DM2 (A1c 6.3 on 4/2019), HTN, who presents for follow up.  \par \par Doing much better.  BP much better.  Otherwise no other sx's. \par \par Cardiac Hx \par \par [facility] 5/6/2019 . MACI to OM1 \par \par [facility] 5/6/2019\par \par RA 6\par RV 39/12\par PA 38/14/21\par PCWP 9 \par PA 63\par CO 6.36/CI 2.75 \par SVR 1182 \par \par [facility]TE 4/2/2019 \par  Conclusions:\par 1. Mitral valve not well visualized, probably normal.\par Minimal mitral regurgitation.\par 2. Normal trileaflet aortic valve. No aortic valve\par regurgitation seen.\par 3. Mild segmental left ventricular systolic dysfunction.\par The mid inferolateral wall, the basal  inferolateral wall,\par the basal anterolateral wall, and the mid anterolateral\par wall are hypokinetic.\par 4. Mild diastolic dysfunction (Stage I).\par 5. Normal right ventricular size and function.\par *** No previous Echo exam. \par \par [facility] 4/1/2019 \par \par LM:   --  Distal left main: Angiography showed minor luminal irregularities\par with no flow limiting lesions.\par LAD:   --  Proximal LAD: There was a diffuse 100 % stenosis. This is a\par likely culprit for the patient's clinical presentation.\par --  D1: There was a tubular 90 % stenosis.\par --  D2: There was a discrete 90 % stenosis at the ostium of the vessel\par segment.\par CX:   --  Proximal circumflex: There was a discrete 80 % stenosis.\par --  OM1: Angiography showed minor luminal irregularities with no flow\par limiting lesions.\par --  OM2: Angiography showed minor luminal irregularities with no flow\par limiting lesions.\par RCA:   --  Proximal RCA: Angiography showed minor luminal irregularities\par with no flow limiting lesions.\par --  Mid RCA: Angiography showed mild atherosclerosis with no flow limiting\par lesions.\par --  RPLS: Angiography showed minor luminal irregularities with no flow\par limiting lesions.\par COMPLICATIONS: There were no complications.\par DIAGNOSTIC IMPRESSIONS: Severe CAD. Occluded LAD. Severe diag and prox LCx\par disesae. No LV gram due to severe CKD.\par INTERVENTIONAL RECOMMENDATIONS: Aspirin and Brilinta

## 2020-01-29 NOTE — PHYSICAL EXAM
[General Appearance - Well Developed] : well developed [General Appearance - Well Nourished] : well nourished [Normal Appearance] : normal appearance [Normal Conjunctiva] : the conjunctiva exhibited no abnormalities [Normal Oral Mucosa] : normal oral mucosa [Auscultation Breath Sounds / Voice Sounds] : lungs were clear to auscultation bilaterally [Murmurs] : no murmurs present [Heart Rate And Rhythm] : heart rate and rhythm were normal [Heart Sounds] : normal S1 and S2 [Abdomen Tenderness] : non-tender [] : no hepato-splenomegaly [Bowel Sounds] : normal bowel sounds [Abnormal Walk] : normal gait [Skin Color & Pigmentation] : normal skin color and pigmentation [FreeTextEntry1] : no BLE edema

## 2020-01-30 NOTE — ASSESSMENT
[FreeTextEntry1] : 80 yo male with ckd 4 likely sec to diabetic nephropathy, HTN, CAD with stents x2 (2000), hypothyroid, depression, transferred from [facility] for NSTEMI and hypertensive urgency s/p CCU stay requiring on nitro gtt s/p PCI with MACI x 2 to pLAD 4/1 with ALDEN \par \par CKD 4- Likely sec to diabetic nephropathy. serological work up negative. ALDEN likely sec to malignant hypertension and worsened by RAMIRO during cardiac cath. Most recently has had stable kidney function and is feeling well. I have had a discussion about dialysis sometime back, but subsequently his volume status has bene optimized and kidney function is stable. no dialysis needs. last creat 3s. \par \par with the next set of labs, will also check phos/pth\par

## 2020-01-30 NOTE — HISTORY OF PRESENT ILLNESS
[FreeTextEntry1] : follow up ckd 4/5 \par \par 77 yo American speaking male with PMH HTN, DMT2, CAD with stents x2 (2000), hypothyroid, depression, transferred from [facility] for NSTEMI and hypertensive urgency s/p CCU stay requiring on nitro gtt s/p PCI with MACI x 2 to [facility] 4/1. Baseline creat in sep 2018 was 2.0. admitted with a creat of 3.0 likely in the setting of malignant hypertension which got worse due to RAMIRO. Discharged with a creat of 3.4 on 4/8/2019 and has beeni in the 3s range. [facility] 5/2019\par \par since his last visit, he has been eating well and has lost weight intentionally. denies any problems with breathing. \par \par ROS \par  - no changes in urination, no blood in urine\par CVS- No chest pain, no palpitations \par all other systems reviewed in detail and were negative except as above

## 2020-01-30 NOTE — PHYSICAL EXAM
[General Appearance - Alert] : alert [General Appearance - In No Acute Distress] : in no acute distress [General Appearance - Well Nourished] : well nourished [General Appearance - Well Developed] : well developed [Sclera] : the sclera and conjunctiva were normal [PERRL With Normal Accommodation] : pupils were equal in size, round, and reactive to light [Outer Ear] : the ears and nose were normal in appearance [Hearing Threshold Finger Rub Not [place]] : hearing was normal [Examination Of The Oral Cavity] : the lips and gums were normal [Neck Appearance] : the appearance of the neck was normal [Jugular Venous Distention Increased] : there was no jugular-venous distention [Neck Cervical Mass (___cm)] : no neck mass was observed [Respiration, Rhythm And Depth] : normal respiratory rhythm and effort [Exaggerated Use Of Accessory Muscles For Inspiration] : no accessory muscle use [Auscultation Breath Sounds / Voice Sounds] : lungs were clear to auscultation bilaterally [Heart Sounds] : normal S1 and S2 [Heart Sounds Gallop] : no gallops [Heart Rate And Rhythm] : heart rate was normal and rhythm regular [Arterial Pulses Carotid] : carotid pulses were normal with no bruits [Edema] : there was no peripheral edema [Abdomen Soft] : soft [Bowel Sounds] : normal bowel sounds [No CVA Tenderness] : no ~M costovertebral angle tenderness [Abdomen Tenderness] : non-tender [No Spinal Tenderness] : no spinal tenderness [Abnormal Walk] : normal gait [Nail Clubbing] : no clubbing  or cyanosis of the fingernails [Musculoskeletal - Swelling] : no joint swelling seen [Motor Tone] : muscle strength and tone were normal [Skin Turgor] : normal skin turgor [Skin Color & Pigmentation] : normal skin color and pigmentation [] : no rash [Skin Lesions] : no skin lesions [Cranial Nerves] : cranial nerves 2-12 were intact [Deep Tendon Reflexes (DTR)] : deep tendon reflexes were 2+ and symmetric [Motor Exam] : the motor exam was normal [Sensation] : the sensory exam was normal to light touch and pinprick [Oriented To Time, Place, And Person] : oriented to person, place, and time [Impaired Insight] : insight and judgment were intact [Affect] : the affect was normal

## 2020-01-31 DIAGNOSIS — I10 ESSENTIAL (PRIMARY) HYPERTENSION: ICD-10-CM

## 2020-01-31 DIAGNOSIS — E11.22 TYPE 2 DIABETES MELLITUS WITH DIABETIC CHRONIC KIDNEY DISEASE: ICD-10-CM

## 2020-02-11 ENCOUNTER — INPATIENT (INPATIENT)
Facility: HOSPITAL | Age: 80
LOS: 2 days | Discharge: HOME | DRG: 392 | End: 2020-02-14
Attending: HOSPITALIST | Admitting: INTERNAL MEDICINE
Payer: MEDICARE

## 2020-02-11 VITALS
RESPIRATION RATE: 18 BRPM | HEIGHT: 71 IN | TEMPERATURE: 98 F | SYSTOLIC BLOOD PRESSURE: 168 MMHG | DIASTOLIC BLOOD PRESSURE: 87 MMHG | HEART RATE: 62 BPM | WEIGHT: 199.08 LBS | OXYGEN SATURATION: 99 %

## 2020-02-11 DIAGNOSIS — Z98.890 OTHER SPECIFIED POSTPROCEDURAL STATES: Chronic | ICD-10-CM

## 2020-02-11 DIAGNOSIS — R07.9 CHEST PAIN, UNSPECIFIED: ICD-10-CM

## 2020-02-11 LAB
ALBUMIN SERPL ELPH-MCNC: 3.7 G/DL — SIGNIFICANT CHANGE UP (ref 3.3–5)
ALP SERPL-CCNC: 89 U/L — SIGNIFICANT CHANGE UP (ref 40–120)
ALT FLD-CCNC: 12 U/L — SIGNIFICANT CHANGE UP (ref 10–45)
ANION GAP SERPL CALC-SCNC: 15 MMOL/L — SIGNIFICANT CHANGE UP (ref 5–17)
APTT BLD: 28.4 SEC — SIGNIFICANT CHANGE UP (ref 27.5–36.3)
AST SERPL-CCNC: 15 U/L — SIGNIFICANT CHANGE UP (ref 10–40)
BASOPHILS # BLD AUTO: 0.04 K/UL — SIGNIFICANT CHANGE UP (ref 0–0.2)
BASOPHILS NFR BLD AUTO: 0.5 % — SIGNIFICANT CHANGE UP (ref 0–2)
BILIRUB SERPL-MCNC: 0.2 MG/DL — SIGNIFICANT CHANGE UP (ref 0.2–1.2)
BUN SERPL-MCNC: 53 MG/DL — HIGH (ref 7–23)
CALCIUM SERPL-MCNC: 9 MG/DL — SIGNIFICANT CHANGE UP (ref 8.4–10.5)
CHLORIDE SERPL-SCNC: 103 MMOL/L — SIGNIFICANT CHANGE UP (ref 96–108)
CO2 SERPL-SCNC: 21 MMOL/L — LOW (ref 22–31)
CREAT SERPL-MCNC: 3.52 MG/DL — HIGH (ref 0.5–1.3)
EOSINOPHIL # BLD AUTO: 0.21 K/UL — SIGNIFICANT CHANGE UP (ref 0–0.5)
EOSINOPHIL NFR BLD AUTO: 2.8 % — SIGNIFICANT CHANGE UP (ref 0–6)
GLUCOSE SERPL-MCNC: 135 MG/DL — HIGH (ref 70–99)
HCT VFR BLD CALC: 32.2 % — LOW (ref 39–50)
HGB BLD-MCNC: 10.2 G/DL — LOW (ref 13–17)
IMM GRANULOCYTES NFR BLD AUTO: 0.3 % — SIGNIFICANT CHANGE UP (ref 0–1.5)
INR BLD: 0.96 RATIO — SIGNIFICANT CHANGE UP (ref 0.88–1.16)
LYMPHOCYTES # BLD AUTO: 2.2 K/UL — SIGNIFICANT CHANGE UP (ref 1–3.3)
LYMPHOCYTES # BLD AUTO: 29.6 % — SIGNIFICANT CHANGE UP (ref 13–44)
MCHC RBC-ENTMCNC: 31.3 PG — SIGNIFICANT CHANGE UP (ref 27–34)
MCHC RBC-ENTMCNC: 31.7 GM/DL — LOW (ref 32–36)
MCV RBC AUTO: 98.8 FL — SIGNIFICANT CHANGE UP (ref 80–100)
MONOCYTES # BLD AUTO: 0.63 K/UL — SIGNIFICANT CHANGE UP (ref 0–0.9)
MONOCYTES NFR BLD AUTO: 8.5 % — SIGNIFICANT CHANGE UP (ref 2–14)
NEUTROPHILS # BLD AUTO: 4.33 K/UL — SIGNIFICANT CHANGE UP (ref 1.8–7.4)
NEUTROPHILS NFR BLD AUTO: 58.3 % — SIGNIFICANT CHANGE UP (ref 43–77)
NRBC # BLD: 0 /100 WBCS — SIGNIFICANT CHANGE UP (ref 0–0)
PLATELET # BLD AUTO: 144 K/UL — LOW (ref 150–400)
POTASSIUM SERPL-MCNC: 4.6 MMOL/L — SIGNIFICANT CHANGE UP (ref 3.5–5.3)
POTASSIUM SERPL-SCNC: 4.6 MMOL/L — SIGNIFICANT CHANGE UP (ref 3.5–5.3)
PROT SERPL-MCNC: 6.4 G/DL — SIGNIFICANT CHANGE UP (ref 6–8.3)
PROTHROM AB SERPL-ACNC: 10.9 SEC — SIGNIFICANT CHANGE UP (ref 10–12.9)
RBC # BLD: 3.26 M/UL — LOW (ref 4.2–5.8)
RBC # FLD: 13.4 % — SIGNIFICANT CHANGE UP (ref 10.3–14.5)
SODIUM SERPL-SCNC: 139 MMOL/L — SIGNIFICANT CHANGE UP (ref 135–145)
TROPONIN T, HIGH SENSITIVITY RESULT: 39 NG/L — SIGNIFICANT CHANGE UP (ref 0–51)
TROPONIN T, HIGH SENSITIVITY RESULT: 40 NG/L — SIGNIFICANT CHANGE UP (ref 0–51)
WBC # BLD: 7.43 K/UL — SIGNIFICANT CHANGE UP (ref 3.8–10.5)
WBC # FLD AUTO: 7.43 K/UL — SIGNIFICANT CHANGE UP (ref 3.8–10.5)

## 2020-02-11 PROCEDURE — 93010 ELECTROCARDIOGRAM REPORT: CPT

## 2020-02-11 PROCEDURE — 99285 EMERGENCY DEPT VISIT HI MDM: CPT | Mod: GC

## 2020-02-11 PROCEDURE — 71046 X-RAY EXAM CHEST 2 VIEWS: CPT | Mod: 26

## 2020-02-11 NOTE — ED PROVIDER NOTE - ATTENDING CONTRIBUTION TO CARE
79M h/o HTN HLD CKD DM CAD Hypothyroidism presenting with CP x 4 days, worsening with exertion, concerning for unstable angina as now more severe pain improving with sl NTG today; ECG NSR without st elevations; no signs/symptoms of acute infectious etiology; no leg swelling, no h/o PE/vte, not c/w PE, presentation not c/w aortic dissection; will check labs: cbc, cmp, trop, proBNP and obtain CXR; likely to need admission as high risk for ACS.

## 2020-02-11 NOTE — ED PROVIDER NOTE - CLINICAL SUMMARY MEDICAL DECISION MAKING FREE TEXT BOX
Attending note (Randal): 79M h/o HTN HLD CKD DM CAD Hypothyroidism presenting with CP x 4 days, worsening with exertion, concerning for unstable angina as now more severe pain improving with sl NTG today; ECG NSR without st elevations; no signs/symptoms of acute infectious etiology; no leg swelling, no h/o PE/vte, not c/w PE, presntation not c/w aortic dissection; will check labs: cbc, cmp, trop, proBNP and obtain CXR; likely to need admission as high risk for ACS. Attending note (Randal): 79M h/o HTN HLD CKD DM CAD Hypothyroidism presenting with CP x 4 days, worsening with exertion, concerning for unstable angina as now more severe pain improving with sl NTG today; ECG NSR without st elevations; no signs/symptoms of acute infectious etiology; no leg swelling, no h/o PE/vte, not c/w PE, presentation not c/w aortic dissection; will check labs: cbc, cmp, trop, proBNP and obtain CXR; likely to need admission as high risk for ACS.

## 2020-02-11 NOTE — ED PROVIDER NOTE - OBJECTIVE STATEMENT
Attending note (Randal): 80 y/o M with h/o HTN, HLD, Hypothyroidism, DM, and CAD (s/p NSTEMI 2019 s/p stents) presenting with chest pain for the past 4 days, left sided, nonradiating; worse with walking/exertion and laying on left side, and a/w shortness of breath when pain is bad.  CP has been constant, but usually mild, becoming moderate at times in past 4 days (typically associated with walking or laying on left side).  Today, became suddenly worse, (not a/w exertion/position) and called 911; given by EMS ASA 81mg x 4 and sl NTG x 2 (with rapid improvement in pain back to baseline "mild" level).  No fever, no cough, no leg swelling, no calf pain.  No recent travel, no recent hospitalizations.    PCP: Toan  Cardio: Akira Sumner  Allergies: NKDA  meds: atorvastatin 10mg qd, brilinta 90mg bid, furosemide 40mg 1 qd prn weight, omeprazole 20mg qd, levothyroxine 175mcg qd, hydralazine 100mg q8h, carvedilol 12.5mg bid, isosorbide mononitrate 30mg x 3 qd, tradjenta 5mg qd, lisinopril 10mg qd.

## 2020-02-11 NOTE — ED ADULT NURSE NOTE - OBJECTIVE STATEMENT
78yo male, with hx stents and DM, BIBA with 4 days of chest pain. As per EMS report, today pain woke pt from sleep. Pt given 324mg ASA and 0.4mg sublingual nitro x2 tabs. Pain "mild" after nitro. Pt A&Ox3. Scottish speaking. DOMINGO. Denies sob. Respirations even/unlabored. Abd soft. No n/v/d. Denies urinary symptoms. Skin WDI.

## 2020-02-12 DIAGNOSIS — I10 ESSENTIAL (PRIMARY) HYPERTENSION: ICD-10-CM

## 2020-02-12 DIAGNOSIS — E03.9 HYPOTHYROIDISM, UNSPECIFIED: ICD-10-CM

## 2020-02-12 DIAGNOSIS — N17.9 ACUTE KIDNEY FAILURE, UNSPECIFIED: ICD-10-CM

## 2020-02-12 DIAGNOSIS — Z29.9 ENCOUNTER FOR PROPHYLACTIC MEASURES, UNSPECIFIED: ICD-10-CM

## 2020-02-12 DIAGNOSIS — Z02.9 ENCOUNTER FOR ADMINISTRATIVE EXAMINATIONS, UNSPECIFIED: ICD-10-CM

## 2020-02-12 DIAGNOSIS — R09.89 OTHER SPECIFIED SYMPTOMS AND SIGNS INVOLVING THE CIRCULATORY AND RESPIRATORY SYSTEMS: ICD-10-CM

## 2020-02-12 DIAGNOSIS — I20.0 UNSTABLE ANGINA: ICD-10-CM

## 2020-02-12 DIAGNOSIS — I50.20 UNSPECIFIED SYSTOLIC (CONGESTIVE) HEART FAILURE: ICD-10-CM

## 2020-02-12 DIAGNOSIS — I25.110 ATHEROSCLEROTIC HEART DISEASE OF NATIVE CORONARY ARTERY WITH UNSTABLE ANGINA PECTORIS: ICD-10-CM

## 2020-02-12 DIAGNOSIS — E11.69 TYPE 2 DIABETES MELLITUS WITH OTHER SPECIFIED COMPLICATION: ICD-10-CM

## 2020-02-12 LAB
ANION GAP SERPL CALC-SCNC: 14 MMOL/L — SIGNIFICANT CHANGE UP (ref 5–17)
BASOPHILS # BLD AUTO: 0.04 K/UL — SIGNIFICANT CHANGE UP (ref 0–0.2)
BASOPHILS NFR BLD AUTO: 0.5 % — SIGNIFICANT CHANGE UP (ref 0–2)
BUN SERPL-MCNC: 54 MG/DL — HIGH (ref 7–23)
CALCIUM SERPL-MCNC: 9.6 MG/DL — SIGNIFICANT CHANGE UP (ref 8.4–10.5)
CHLORIDE SERPL-SCNC: 103 MMOL/L — SIGNIFICANT CHANGE UP (ref 96–108)
CK MB CFR SERPL CALC: 2.6 NG/ML — SIGNIFICANT CHANGE UP (ref 0–6.7)
CK SERPL-CCNC: 92 U/L — SIGNIFICANT CHANGE UP (ref 30–200)
CO2 SERPL-SCNC: 22 MMOL/L — SIGNIFICANT CHANGE UP (ref 22–31)
CREAT SERPL-MCNC: 3.56 MG/DL — HIGH (ref 0.5–1.3)
EOSINOPHIL # BLD AUTO: 0.2 K/UL — SIGNIFICANT CHANGE UP (ref 0–0.5)
EOSINOPHIL NFR BLD AUTO: 2.6 % — SIGNIFICANT CHANGE UP (ref 0–6)
GLUCOSE SERPL-MCNC: 93 MG/DL — SIGNIFICANT CHANGE UP (ref 70–99)
HBA1C BLD-MCNC: 5.7 % — HIGH (ref 4–5.6)
HCT VFR BLD CALC: 34.6 % — LOW (ref 39–50)
HGB BLD-MCNC: 10.9 G/DL — LOW (ref 13–17)
IMM GRANULOCYTES NFR BLD AUTO: 0.1 % — SIGNIFICANT CHANGE UP (ref 0–1.5)
LYMPHOCYTES # BLD AUTO: 2.64 K/UL — SIGNIFICANT CHANGE UP (ref 1–3.3)
LYMPHOCYTES # BLD AUTO: 34.7 % — SIGNIFICANT CHANGE UP (ref 13–44)
MAGNESIUM SERPL-MCNC: 2.4 MG/DL — SIGNIFICANT CHANGE UP (ref 1.6–2.6)
MCHC RBC-ENTMCNC: 31 PG — SIGNIFICANT CHANGE UP (ref 27–34)
MCHC RBC-ENTMCNC: 31.5 GM/DL — LOW (ref 32–36)
MCV RBC AUTO: 98.3 FL — SIGNIFICANT CHANGE UP (ref 80–100)
MONOCYTES # BLD AUTO: 0.64 K/UL — SIGNIFICANT CHANGE UP (ref 0–0.9)
MONOCYTES NFR BLD AUTO: 8.4 % — SIGNIFICANT CHANGE UP (ref 2–14)
NEUTROPHILS # BLD AUTO: 4.07 K/UL — SIGNIFICANT CHANGE UP (ref 1.8–7.4)
NEUTROPHILS NFR BLD AUTO: 53.7 % — SIGNIFICANT CHANGE UP (ref 43–77)
NRBC # BLD: 0 /100 WBCS — SIGNIFICANT CHANGE UP (ref 0–0)
NT-PROBNP SERPL-SCNC: 1306 PG/ML — HIGH (ref 0–300)
PLATELET # BLD AUTO: 142 K/UL — LOW (ref 150–400)
POTASSIUM SERPL-MCNC: 4.4 MMOL/L — SIGNIFICANT CHANGE UP (ref 3.5–5.3)
POTASSIUM SERPL-SCNC: 4.4 MMOL/L — SIGNIFICANT CHANGE UP (ref 3.5–5.3)
RBC # BLD: 3.52 M/UL — LOW (ref 4.2–5.8)
RBC # FLD: 13.3 % — SIGNIFICANT CHANGE UP (ref 10.3–14.5)
SODIUM SERPL-SCNC: 139 MMOL/L — SIGNIFICANT CHANGE UP (ref 135–145)
TROPONIN T, HIGH SENSITIVITY RESULT: 40 NG/L — SIGNIFICANT CHANGE UP (ref 0–51)
WBC # BLD: 7.6 K/UL — SIGNIFICANT CHANGE UP (ref 3.8–10.5)
WBC # FLD AUTO: 7.6 K/UL — SIGNIFICANT CHANGE UP (ref 3.8–10.5)

## 2020-02-12 PROCEDURE — 99223 1ST HOSP IP/OBS HIGH 75: CPT | Mod: GC

## 2020-02-12 PROCEDURE — 12345: CPT | Mod: NC

## 2020-02-12 PROCEDURE — 99223 1ST HOSP IP/OBS HIGH 75: CPT

## 2020-02-12 PROCEDURE — 93010 ELECTROCARDIOGRAM REPORT: CPT

## 2020-02-12 PROCEDURE — 93306 TTE W/DOPPLER COMPLETE: CPT | Mod: 26

## 2020-02-12 RX ORDER — DEXTROSE 50 % IN WATER 50 %
25 SYRINGE (ML) INTRAVENOUS ONCE
Refills: 0 | Status: DISCONTINUED | OUTPATIENT
Start: 2020-02-12 | End: 2020-02-14

## 2020-02-12 RX ORDER — DEXTROSE 50 % IN WATER 50 %
15 SYRINGE (ML) INTRAVENOUS ONCE
Refills: 0 | Status: DISCONTINUED | OUTPATIENT
Start: 2020-02-12 | End: 2020-02-14

## 2020-02-12 RX ORDER — HYDRALAZINE HCL 50 MG
100 TABLET ORAL EVERY 8 HOURS
Refills: 0 | Status: DISCONTINUED | OUTPATIENT
Start: 2020-02-12 | End: 2020-02-14

## 2020-02-12 RX ORDER — ASPIRIN/CALCIUM CARB/MAGNESIUM 324 MG
81 TABLET ORAL DAILY
Refills: 0 | Status: DISCONTINUED | OUTPATIENT
Start: 2020-02-12 | End: 2020-02-14

## 2020-02-12 RX ORDER — TICAGRELOR 90 MG/1
90 TABLET ORAL EVERY 12 HOURS
Refills: 0 | Status: DISCONTINUED | OUTPATIENT
Start: 2020-02-12 | End: 2020-02-14

## 2020-02-12 RX ORDER — INSULIN LISPRO 100/ML
VIAL (ML) SUBCUTANEOUS AT BEDTIME
Refills: 0 | Status: DISCONTINUED | OUTPATIENT
Start: 2020-02-12 | End: 2020-02-14

## 2020-02-12 RX ORDER — ISOSORBIDE MONONITRATE 60 MG/1
90 TABLET, EXTENDED RELEASE ORAL DAILY
Refills: 0 | Status: DISCONTINUED | OUTPATIENT
Start: 2020-02-12 | End: 2020-02-14

## 2020-02-12 RX ORDER — GLUCAGON INJECTION, SOLUTION 0.5 MG/.1ML
1 INJECTION, SOLUTION SUBCUTANEOUS ONCE
Refills: 0 | Status: DISCONTINUED | OUTPATIENT
Start: 2020-02-12 | End: 2020-02-14

## 2020-02-12 RX ORDER — NITROGLYCERIN 6.5 MG
0.4 CAPSULE, EXTENDED RELEASE ORAL ONCE
Refills: 0 | Status: DISCONTINUED | OUTPATIENT
Start: 2020-02-12 | End: 2020-02-14

## 2020-02-12 RX ORDER — ATORVASTATIN CALCIUM 80 MG/1
10 TABLET, FILM COATED ORAL AT BEDTIME
Refills: 0 | Status: DISCONTINUED | OUTPATIENT
Start: 2020-02-12 | End: 2020-02-12

## 2020-02-12 RX ORDER — LISINOPRIL 2.5 MG/1
10 TABLET ORAL DAILY
Refills: 0 | Status: DISCONTINUED | OUTPATIENT
Start: 2020-02-12 | End: 2020-02-13

## 2020-02-12 RX ORDER — PANTOPRAZOLE SODIUM 20 MG/1
40 TABLET, DELAYED RELEASE ORAL
Refills: 0 | Status: DISCONTINUED | OUTPATIENT
Start: 2020-02-12 | End: 2020-02-14

## 2020-02-12 RX ORDER — DEXTROSE 50 % IN WATER 50 %
12.5 SYRINGE (ML) INTRAVENOUS ONCE
Refills: 0 | Status: DISCONTINUED | OUTPATIENT
Start: 2020-02-12 | End: 2020-02-14

## 2020-02-12 RX ORDER — FUROSEMIDE 40 MG
40 TABLET ORAL DAILY
Refills: 0 | Status: DISCONTINUED | OUTPATIENT
Start: 2020-02-12 | End: 2020-02-12

## 2020-02-12 RX ORDER — SODIUM CHLORIDE 9 MG/ML
1000 INJECTION, SOLUTION INTRAVENOUS
Refills: 0 | Status: DISCONTINUED | OUTPATIENT
Start: 2020-02-12 | End: 2020-02-14

## 2020-02-12 RX ORDER — AMLODIPINE BESYLATE 2.5 MG/1
5 TABLET ORAL DAILY
Refills: 0 | Status: DISCONTINUED | OUTPATIENT
Start: 2020-02-12 | End: 2020-02-14

## 2020-02-12 RX ORDER — INSULIN LISPRO 100/ML
VIAL (ML) SUBCUTANEOUS
Refills: 0 | Status: DISCONTINUED | OUTPATIENT
Start: 2020-02-12 | End: 2020-02-14

## 2020-02-12 RX ORDER — CARVEDILOL PHOSPHATE 80 MG/1
12.5 CAPSULE, EXTENDED RELEASE ORAL EVERY 12 HOURS
Refills: 0 | Status: DISCONTINUED | OUTPATIENT
Start: 2020-02-12 | End: 2020-02-12

## 2020-02-12 RX ORDER — HEPARIN SODIUM 5000 [USP'U]/ML
5000 INJECTION INTRAVENOUS; SUBCUTANEOUS EVERY 12 HOURS
Refills: 0 | Status: DISCONTINUED | OUTPATIENT
Start: 2020-02-12 | End: 2020-02-14

## 2020-02-12 RX ORDER — CARVEDILOL PHOSPHATE 80 MG/1
25 CAPSULE, EXTENDED RELEASE ORAL EVERY 12 HOURS
Refills: 0 | Status: DISCONTINUED | OUTPATIENT
Start: 2020-02-12 | End: 2020-02-14

## 2020-02-12 RX ORDER — LEVOTHYROXINE SODIUM 125 MCG
175 TABLET ORAL DAILY
Refills: 0 | Status: DISCONTINUED | OUTPATIENT
Start: 2020-02-12 | End: 2020-02-14

## 2020-02-12 RX ORDER — ATORVASTATIN CALCIUM 80 MG/1
80 TABLET, FILM COATED ORAL AT BEDTIME
Refills: 0 | Status: DISCONTINUED | OUTPATIENT
Start: 2020-02-12 | End: 2020-02-14

## 2020-02-12 RX ADMIN — Medication 81 MILLIGRAM(S): at 11:26

## 2020-02-12 RX ADMIN — Medication 100 MILLIGRAM(S): at 11:26

## 2020-02-12 RX ADMIN — CARVEDILOL PHOSPHATE 25 MILLIGRAM(S): 80 CAPSULE, EXTENDED RELEASE ORAL at 17:35

## 2020-02-12 RX ADMIN — ATORVASTATIN CALCIUM 10 MILLIGRAM(S): 80 TABLET, FILM COATED ORAL at 01:11

## 2020-02-12 RX ADMIN — CARVEDILOL PHOSPHATE 12.5 MILLIGRAM(S): 80 CAPSULE, EXTENDED RELEASE ORAL at 01:11

## 2020-02-12 RX ADMIN — HEPARIN SODIUM 5000 UNIT(S): 5000 INJECTION INTRAVENOUS; SUBCUTANEOUS at 17:35

## 2020-02-12 RX ADMIN — Medication 100 MILLIGRAM(S): at 21:18

## 2020-02-12 RX ADMIN — AMLODIPINE BESYLATE 5 MILLIGRAM(S): 2.5 TABLET ORAL at 16:12

## 2020-02-12 RX ADMIN — TICAGRELOR 90 MILLIGRAM(S): 90 TABLET ORAL at 01:11

## 2020-02-12 RX ADMIN — PANTOPRAZOLE SODIUM 40 MILLIGRAM(S): 20 TABLET, DELAYED RELEASE ORAL at 05:38

## 2020-02-12 RX ADMIN — ISOSORBIDE MONONITRATE 90 MILLIGRAM(S): 60 TABLET, EXTENDED RELEASE ORAL at 11:26

## 2020-02-12 RX ADMIN — Medication 100 MILLIGRAM(S): at 05:38

## 2020-02-12 RX ADMIN — Medication 100 MILLIGRAM(S): at 01:11

## 2020-02-12 RX ADMIN — Medication 40 MILLIGRAM(S): at 05:38

## 2020-02-12 RX ADMIN — ATORVASTATIN CALCIUM 80 MILLIGRAM(S): 80 TABLET, FILM COATED ORAL at 21:18

## 2020-02-12 RX ADMIN — Medication 175 MICROGRAM(S): at 05:38

## 2020-02-12 RX ADMIN — TICAGRELOR 90 MILLIGRAM(S): 90 TABLET ORAL at 17:35

## 2020-02-12 NOTE — H&P ADULT - NEGATIVE SKIN SYMPTOMS
[facility]  [id number] [facility] [id number] [phone]    Outpatient Progress Note  Patient:  Concepción Morales  YOB: 1965  Medical Record Number:  [id number]    30 minutes including counseling and coordination of care    Date:  8/23/2018      Diagnoses/Problems addressed during this visit:  F31.63 Bipolar 1 disorder, mixed, severe (CMS/HCC)  (primary encounter diagnosis)  F41.0 Panic disorder  F43.10 PTSD (post-traumatic stress disorder)  R25.9 Abnormal involuntary movements   In process of workup for multiple sclerosis    Current Status:    At the patient's  last appointment we discussed setting boundaries with . She continues current medications, does have dry mouth however if she does not take the benztropine she develops slurred speech and her  mocks her. Her new grandchild was born with jaundice, daughter had a uterine infection, this was extremely frightening and traumatic for her, she is still having flashbacks related to that experience. The Aricept seems to be helping she is able to cook regular meals and recipes at this point.        Past Medical History:  The following were reviewed with patient and in the electronic record as past history and active problems:    Patient Active Problem List   Diagnosis   • OA (osteoarthritis)   • Essential (primary) hypertension   • Panic disorder   • PTSD (post-traumatic stress disorder)   • Dissociative disorder or reaction   • Bipolar 1 disorder, mixed, severe (CMS/HCC)   • Dyslipidemia   • Abnormal fasting glucose   • Mild cognitive impairment   • Abnormal involuntary movements       Allergies:  ALLERGIES:   Allergen Reactions   • Codeine Nausea & Vomiting   • Morphine Nausea & Vomiting   • Sulfa Antibiotics      Tongue swell, hi fever and lose taste buds.         Weight:  There were no vitals taken for this visit.      Current  Medications:    Medications were reviewed in the electronic record.  Current Outpatient Prescriptions   Medication Sig Dispense Refill   • ALPRAZolam (XANAX) 0.5 MG tablet Take 1  pill daily as needed for anxiety or insomnia, NTE 30 pills per month 30 tablet 5   • ALPRAZolam ER (XANAX XR) 0.5 MG extended release tablet Take 1 pill in the AM and 1 pill in the PM 60 tablet 5   • ARIPiprazole (ABILIFY) 15 MG tablet Take 1 tablet by mouth nightly. 90 tablet 1   • benztropine (COGENTIN) 0.5 MG tablet Take 2 in the am and 1 in the pm to prevent thick tongue 270 tablet 1   • lamotrigine (LAMICTAL) 200 MG tablet Take 1 tablet by mouth nightly. Take 200 mg in the PM 90 tablet 1   • venlafaxine XR (EFFEXOR XR) 75 MG 24 hr capsule Take 75 mg daily 90 capsule 1   • hydrochlorothiazide (HYDRODIURIL) 25 MG tablet TAKE ONE TABLET BY MOUTH DAILY. 90 tablet 0   • lisinopril (PRINIVIL,ZESTRIL) 40 MG tablet TAKE ONE TABLET BY MOUTH DAILY. 90 tablet 0   • donepezil (ARICEPT) 10 MG tablet TAKE ONE TABLET BY MOUTH NIGHTLY. 90 tablet 1   • atorvastatin (LIPITOR) 20 MG tablet Take 1 tablet by mouth daily. 90 tablet 1   • albuterol (PROVENTIL HFA) 108 (90 BASE) MCG/ACT inhaler Inhale 2 puffs into the lungs every 4 hours as needed for Shortness of Breath. 3 Inhaler 1   • magnesium gluconate (MAGONATE) 500 MG tablet Take 500 mg by mouth 2 times daily.     • Omega-3 Fatty Acids (FISH OIL BURP-LESS) 1000 MG Cap Take by mouth daily.     • Naproxen Sodium (ALEVE PO) Take by mouth daily.     • Multiple Vitamins-Minerals (MULTIVITAMIN PO) Take  by mouth daily.     • Cholecalciferol (VITAMIN D3) 51880 UNITS CAPS Take 1,000 Units by mouth daily.      • cyanocobalamin (VITAMIN B-12) 500 MCG tablet Take 500 mcg by mouth 2 times daily. Take 2 by mouth twice daily        No current facility-administered medications for this visit.        Mental Status Exam:    Appearance: Casually groomed, fair eye contact.  Habitus:  A bit overweight  Musculoskeletal:   Well-developed, no abnormal movements.  Gait/Station: Unremarkable.  Speech:  Normal tone, rate and rhythm.  Associations:  Intact.  Mood/Affect:  Stressed but able to pull herself together  Concentration/Focus: Good during today's appointment.  Thought Content:  Denies suicidal or homicidal ideation. Denies hallucinations, delusions, ideas of reference.  Insight/Judgment:  Fair  Orientation:  Oriented to person, place, date and time.      Medical Decision Making:    Initially she is quite distraught, the episode of baby and daughter being sick seemed to overwhelm her and she went into a panic attack. Did inform her that she could take an extra 0.5 mg once or twice a month for emergencies. Also informed her that she can take the benztropine as little as possible for the thick tongue, this is giving her the dry mouth. She is going to use a lubricating mouthwash. Discussed her concern that she is going through a workup for multiple sclerosis. The pharmacy incorrect leave provided alprazolam 0.25 mg tablets, correction was written on ABS for patient to take the pharmacy.      Return to clinic:     3 months    Denise A Brachmann, MD    Board Certified Child and Adult Psychiatrist         no rash/no itching

## 2020-02-12 NOTE — CONSULT NOTE ADULT - ASSESSMENT
78 year old Italian-speaking male with PMH ICM (EF 45-50%), CAD s/p PCI 2000 and CABG x2V, NSTEMI 4/2019 s/p PCI x2 to LAD and staged PCI to OM1 on 5/2019, CKD IV, HTN, DM2 presents for left substernal chest pain for 4 days. EKG without acute changes; negative delta trop. Chest pain at rest without EKG changes and negative trops consistent with unstable angina.    #unstable angina  - c/w DAPT   - 78 year old Slovak-speaking male with PMH ICM (EF 45-50%), CAD s/p PCI 2000 and CABG x2V, NSTEMI 4/2019 s/p PCI x2 to LAD and staged PCI to OM1 on 5/2019, CKD IV, HTN, DM2 presents for left substernal chest pain for 4 days. EKG without acute changes; negative delta trop. Chest pain at rest without EKG changes and negative trops consistent with unstable angina.    #unstable angina  - c/w DAPT and atorvastatin 80   - c/w home coreg 25 BID and lisinopril 10mg QD 78 year old Danish-speaking male with PMH ICM (EF 45-50%), CAD s/p PCI 2000 and CABG x2V, NSTEMI 4/2019 s/p PCI x2 to LAD and staged PCI to OM1 on 5/2019, CKD IV, HTN, DM2 presents for left substernal chest pain for 4 days. EKG without acute changes; high sensitivity troponin negative and negative delta. This prolonged chest pain at rest without EKG changes and negative troponin is not characteristic of angina.    - c/w DAPT and atorvastatin 80   - c/w home coreg 25 BID and lisinopril 10mg QD  - seek non-cardiac explanations for symptoms. 78 year old Estonian-speaking male with PMH ICM (EF 45-50%), CAD s/p PCI 2000 and CABG x2V, NSTEMI 4/2019 s/p PCI x2 to LAD and staged PCI to OM1 on 5/2019, CKD IV, HTN, DM2 presents for left substernal chest pain for 4 days. EKG without acute changes; high sensitivity troponin negative and negative delta. This prolonged chest pain at rest without EKG changes and negative troponin is not characteristic of angina.    - c/w DAPT and atorvastatin 80   - c/w home coreg 25 BID and lisinopril 10mg QD  - seek non-cardiac explanations for symptoms.  - please stop lasix. Pt is no longer on this medication per last outpatient cardiology visit.

## 2020-02-12 NOTE — H&P ADULT - NSHPPHYSICALEXAM_GEN_ALL_CORE
Vital Signs Last 24 Hrs  T(C): 36.8 (02-11-20 @ 19:34), Max: 36.8 (02-11-20 @ 19:34)  T(F): 98.2 (02-11-20 @ 19:34), Max: 98.2 (02-11-20 @ 19:34)  HR: 62 (02-11-20 @ 19:34) (62 - 62)  BP: 168/87 (02-11-20 @ 19:34) (168/87 - 168/87)  BP(mean): --  RR: 18 (02-11-20 @ 19:34) (18 - 18)  SpO2: 99% (02-11-20 @ 19:34) (99% - 99%)

## 2020-02-12 NOTE — CHART NOTE - NSCHARTNOTEFT_GEN_A_CORE
80 y/o  male  who presents with a chief complaint of Chest Pain (12 Feb 2020 10:17)    Event:  On rounds at 8:30 am patient with c/o CP . with the use  of the  , was able to ascertain patient's complaint   Per patient c/o chest heaviness 6/10.         Vital Signs Last 24 Hrs  T(C): 36.5 (12 Feb 2020 11:10), Max: 36.8 (11 Feb 2020 19:34)  T(F): 97.7 (12 Feb 2020 11:10), Max: 98.2 (11 Feb 2020 19:34)  HR: 56 (12 Feb 2020 11:10) (56 - 62)  BP: 174/75 (12 Feb 2020 11:10) (150/83 - 179/92)  BP(mean): --  RR: 18 (12 Feb 2020 11:10) (18 - 18)  SpO2: 97% (12 Feb 2020 11:10) (97% - 99%)      Labs:                          10.9   7.60  )-----------( 142      ( 12 Feb 2020 06:33 )             34.6     02-12    139  |  103  |  54<H>  ----------------------------<  93  4.4   |  22  |  3.56<H>    Ca    9.6      12 Feb 2020 06:33  Mg     2.4     02-12    TPro  6.4  /  Alb  3.7  /  TBili  0.2  /  DBili  x   /  AST  15  /  ALT  12  /  AlkPhos  89  02-11            Radiology:    Physical Exam:  General: WN/WD NAD  Neurology: A&Ox3, nonfocal, DOMINGO x 4  Respiratory: CTA B/L  CV: RRR, S1S2, no murmur  Abdominal: Soft, NT, ND + BS   MSK: No edema, + peripheral pulses,    Assessment & Plan:  HPI:  79M [place] Islander speaking w/ hx of CAD s/p 2DES May 2019, DM2, HTN, HLD, CHF, Hypothyroid p/w chest pain. Pt's daughter states pt has been having intermittent L sided chest pressure and pain over the past 4 days. Somewhat worsened with exertion. Has been in bed mostly for past 4 days due fear of pain. Today, pain persisted longer than usual and daughter decided to bring pt to ER for further evaluation. Denies missing any medications. Not obvious worse with palpation. Pt received ASA and nitro on way to hospital. Nitro improved pt's pain symptoms. Pt still has small amount of residual pain largely improved. Endorses some mild SOB as well with pain. Endorses medication compliance. States current LE edema is baseline. Denies any recent travel, fevers, chills cough. (12 Feb 2020 00:17)    >  >  >  > 78 y/o  male  who presents with a chief complaint of Chest Pain (12 Feb 2020 10:17)    Event:  On rounds at 8:30 am patient with c/o CP . with the use  of the  , was able to ascertain patient's complaint   Per patient c/o chest heaviness 6/10, feels like something is sitting on his chest.  He denies c/o SOB, dizziness or palpitation       Vital Signs Last 24 Hrs  T(C): 36.5 (12 Feb 2020 11:10), Max: 36.8 (11 Feb 2020 19:34)  T(F): 97.7 (12 Feb 2020 11:10), Max: 98.2 (11 Feb 2020 19:34)  HR: 56 (12 Feb 2020 11:10) (56 - 62)  BP: 174/75 (12 Feb 2020 11:10) (150/83 - 179/92)  BP(mean): --  RR: 18 (12 Feb 2020 11:10) (18 - 18)  SpO2: 97% (12 Feb 2020 11:10) (97% - 99%)      Labs:                          10.9   7.60  )-----------( 142      ( 12 Feb 2020 06:33 )             34.6     02-12    139  |  103  |  54<H>  ----------------------------<  93  4.4   |  22  |  3.56<H>    Ca    9.6      12 Feb 2020 06:33  Mg     2.4     02-12    TPro  6.4  /  Alb  3.7  /  TBili  0.2  /  DBili  x   /  AST  15  /  ALT  12  /  AlkPhos  89  02-11            Radiology:    Physical Exam:  General: WN/WD NAD  Neurology: A&Ox3, nonfocal, DOMINGO x 4  Respiratory: CTA B/L  CV: RRR, S1S2, no murmur  Abdominal: Soft, NT, ND + BS   MSK: No edema, + peripheral pulses,    Assessment & Plan:  HPI:  79M [place] speaking w/ hx of CAD s/p 2DES May 2019, DM2, HTN, HLD, CHF, Hypothyroid p/w chest pain. Pt's daughter states pt has been having intermittent L sided chest pressure and pain over the past 4 days. Somewhat worsened with exertion. Has been in bed mostly for past 4 days due fear of pain. Today, pain persisted longer than usual and daughter decided to bring pt to ER for further evaluation. Denies missing any medications. Not obvious worse with palpation. Pt received ASA and nitro on way to hospital. Nitro improved pt's pain symptoms. Pt still has small amount of residual pain largely improved. Endorses some mild SOB as well with pain. Endorses medication compliance. States current LE edema is baseline. Denies any recent travel, fevers, chills cough. (12 Feb 2020 00:17)   Patient now with CP 6/10     > EKG done ( flat T waves lateral leads)  > Cardiology called   > Troponin level sent  > Echo cardiogram     Discuss plan with Dr. Bragg, spoke with cardiology, patient went for Echo now.      Carla Guardado DNP- C  [id number]

## 2020-02-12 NOTE — CHART NOTE - NSCHARTNOTEFT_GEN_A_CORE
MEDICINE NP     YE MARION  79y Male  Patient is a 79y old  Male who presents with a chief complaint of Chest Pain (12 Feb 2020 13:10)       Follow up on Echocardiogram:  received call from Dr. Harrison, with results of an abnormal Echo, showing lateral ischemia   Cardiology and attending  called, CE 's sent       Carla Guardado DNP-C  Medicine Department

## 2020-02-12 NOTE — PROGRESS NOTE ADULT - PROBLEM SELECTOR PLAN 7
bp is elevated 160-170s  c/w cardiac meds as above, f/u with cards if should increase ACE=I in CKD vs add amldoipine bp is elevated 160-170s  c/w cardiac meds as above, f/u with cards if should increase ACE-I in CKD with expected contrast vs add amlodipine

## 2020-02-12 NOTE — H&P ADULT - NSICDXPASTSURGICALHX_GEN_ALL_CORE_FT
PAST SURGICAL HISTORY:  History of left heart catheterization (LHC) 2000  and 04/2019    History of open heart surgery CABG x 2V

## 2020-02-12 NOTE — H&P ADULT - HISTORY OF PRESENT ILLNESS
79M Belarusian speaking w/ hx of CAD s/p 2DES May 2019, DM2, HTN, HLD, CHF, Hypothyroid p/w chest pain. Pt's daughter states pt has been having intermittent L sided chest pressure and pain over the past 4 days. Somewhat worsened with exertion. Has been in bed mostly for past 4 days due fear of pain. Today, pain persisted longer than usual and daughter decided to bring pt to ER for further evaluation. Denies missing any medications. Not obvious worse with palpation. Pt received ASA and nitro on way to hospital. Nitro improved pt's pain symptoms. Pt still has small amount of residual pain largely improved. Endorses some mild SOB as well with pain. Endorses medication compliance. States current LE edema is baseline. Denies any recent travel, fevers, chills cough.

## 2020-02-12 NOTE — H&P ADULT - NSHPLABSRESULTS_GEN_ALL_CORE
I have reviewed the labs, imaging and ekg. EKG with NSR HR 63, QTc 460, non-specific ST segment findings.

## 2020-02-12 NOTE — H&P ADULT - PROBLEM SELECTOR PLAN 4
EF ~50% with some hypokinesis on recent admission  -Cont. coreg  -Cont. imdur  -Cont. hydralazine  -Holding ACE for now given possible ALDEN??

## 2020-02-12 NOTE — H&P ADULT - PROBLEM SELECTOR PLAN 1
EKG relative unremarkable and trop neg x3. Pt still with some mild pain but largely improved.   -Cont. asa and brilinta  -Nitro SubL PRN severe pain  -Telemetry  -Cardiology consult in AM  -Will have low threshold to consult cardiology overnight if pain worsens significantly.

## 2020-02-12 NOTE — PROGRESS NOTE ADULT - PROBLEM SELECTOR PLAN 2
Cr 3.5, baseline 3.0-3.5, unclear if ALDEN vs flucation  -renal consulted in case needs cath Cr 3.5, baseline 3.0-3.5, unclear if ALDEN vs fluctuation  -renal consulted for optimizing contrast for cath

## 2020-02-12 NOTE — H&P ADULT - PROBLEM SELECTOR PLAN 7
1.  Name of PCP: [facility]  2.  PCP Contacted on Admission: [ ] Y    [x ] N    3.  PCP contacted at Discharge: [ ] Y    [ ] N    [ ] N/A  4.  Post-Discharge Appointment Date and Location:  5.  Summary of Handoff given to PCP: -Trend vital signs  -Unclear if still taking amlodipine, not seen on current med list from home

## 2020-02-12 NOTE — PROGRESS NOTE ADULT - PROBLEM SELECTOR PLAN 1
EKG reviewed today, sinus TWI III, AVF which appear old, more flat in lead v3   -3 trops flat, 4th one added on  -Cont. asa and brilinta  -Nitro SubL PRN severe pain  -monitor on Telemetry  -Cardiology consulted, rec TTE and given further recs today EKG reviewed today, sinus TWI III, AVF which appear old, more flat in lead v3   -49 from 3 values around 40, will continue to trend enzymes  -Cont. asa and brilinta  -Nitro SubL PRN severe pain  -monitor on Telemetry  -TTE with EF 50%, Akinesis of the inferolateral wall aand possibly the basal  anterolateral wall, per cards likely old unchanged  -Cardiology consulted, will cath stephanie, trend enzymes, no heparin ggt for now but will given final rec later today

## 2020-02-12 NOTE — H&P ADULT - PROBLEM SELECTOR PLAN 8
DVT PPx  -SCDs 1.  Name of PCP: [facility]  2.  PCP Contacted on Admission: [ ] Y    [x ] N    3.  PCP contacted at Discharge: [ ] Y    [ ] N    [ ] N/A  4.  Post-Discharge Appointment Date and Location:  5.  Summary of Handoff given to PCP:

## 2020-02-12 NOTE — PROGRESS NOTE ADULT - PROBLEM SELECTOR PLAN 4
EF ~50%, euvolemic  -Cont. coreg  -Cont. imdur 90mg daily, coreg 25mg bid  -Cont. hydralazine 100mg  TID  -c/w lisinopril 10mg daily, may need to increase dose as bp elevated EF ~50%, euvolemic  -Cont. coreg 25mg bid, imdur 90mg daily, hydralazine 100mg  TID  -c/w lisinopril 10mg daily, may need to increase dose as bp elevated, f/u cards

## 2020-02-12 NOTE — PROGRESS NOTE ADULT - PROBLEM SELECTOR PLAN 3
CAD with stents 2019  -Cont. asa and brilinta  -Cont. atorvastatin, toprol CAD with stents 2019  -Cont. asa and brilinta bid  -Cont. atorvastatin, toprol

## 2020-02-12 NOTE — H&P ADULT - PROBLEM SELECTOR PROBLEM 5
Hypothyroidism, unspecified type Type 2 diabetes mellitus with other specified complication, without long-term current use of insulin

## 2020-02-12 NOTE — H&P ADULT - ASSESSMENT
79M Japanese speaking w/ hx of CAD s/p 2DES May 2019, DM2, HTN, HLD, CHF, Hypothyroid p/w unstable angina

## 2020-02-12 NOTE — H&P ADULT - PROBLEM SELECTOR PLAN 2
Renal function has been worsening recently as per daughter. Unclear if current Crt is new baseline.  -Renal dose medication  -Trend BMP  -Will hold ACE for now  -consider kidney US

## 2020-02-12 NOTE — PROGRESS NOTE ADULT - SUBJECTIVE AND OBJECTIVE BOX
Patient is a 79y old  Male who presents with a chief complaint of Chest Pain (12 Feb 2020 10:17)      SUBJECTIVE / OVERNIGHT EVENTS: No overnight events. Still reports 6/10 left sided chest pain, present at rest, no allievating or worsening factor, does not change with position and is not pleurtic. Denies sob, palpitation, n/v, LE swelling, recent travel    Tele reviewed: sinus 50-60      ADDITIONAL REVIEW OF SYSTEMS: Negative except for above    MEDICATIONS  (STANDING):  aspirin  chewable 81 milliGRAM(s) Oral daily  atorvastatin 80 milliGRAM(s) Oral at bedtime  carvedilol 25 milliGRAM(s) Oral every 12 hours  dextrose 5%. 1000 milliLiter(s) (50 mL/Hr) IV Continuous <Continuous>  dextrose 50% Injectable 12.5 Gram(s) IV Push once  dextrose 50% Injectable 25 Gram(s) IV Push once  dextrose 50% Injectable 25 Gram(s) IV Push once  furosemide    Tablet 40 milliGRAM(s) Oral daily  hydrALAZINE 100 milliGRAM(s) Oral every 8 hours  insulin lispro (HumaLOG) corrective regimen sliding scale   SubCutaneous three times a day before meals  insulin lispro (HumaLOG) corrective regimen sliding scale   SubCutaneous at bedtime  isosorbide   mononitrate ER Tablet (IMDUR) 90 milliGRAM(s) Oral daily  levothyroxine 175 MICROGram(s) Oral daily  lisinopril 10 milliGRAM(s) Oral daily  pantoprazole    Tablet 40 milliGRAM(s) Oral before breakfast  ticagrelor 90 milliGRAM(s) Oral every 12 hours    MEDICATIONS  (PRN):  dextrose 40% Gel 15 Gram(s) Oral once PRN Blood Glucose LESS THAN 70 milliGRAM(s)/deciliter  glucagon  Injectable 1 milliGRAM(s) IntraMuscular once PRN Glucose LESS THAN 70 milligrams/deciliter  nitroglycerin     SubLingual 0.4 milliGRAM(s) SubLingual once PRN Chest Pain      CAPILLARY BLOOD GLUCOSE      POCT Blood Glucose.: 119 mg/dL (12 Feb 2020 11:37)  POCT Blood Glucose.: 122 mg/dL (12 Feb 2020 08:20)  POCT Blood Glucose.: 97 mg/dL (12 Feb 2020 01:57)    I&O's Summary    12 Feb 2020 07:01  -  12 Feb 2020 13:10  --------------------------------------------------------  IN: 480 mL / OUT: 1300 mL / NET: -820 mL        PHYSICAL EXAM:  Vital Signs Last 24 Hrs  T(C): 36.5 (12 Feb 2020 11:10), Max: 36.8 (11 Feb 2020 19:34)  T(F): 97.7 (12 Feb 2020 11:10), Max: 98.2 (11 Feb 2020 19:34)  HR: 56 (12 Feb 2020 11:10) (56 - 62)  BP: 174/75 (12 Feb 2020 11:10) (150/83 - 179/92)  BP(mean): --  RR: 18 (12 Feb 2020 11:10) (18 - 18)  SpO2: 97% (12 Feb 2020 11:10) (97% - 99%)    PHYSICAL EXAM:  GENERAL: NAD, well-developed  HEAD:  Atraumatic, Normocephalic  NECK: Supple, No JVD  CHEST/LUNG: Clear to auscultation bilaterally; No wheeze  HEART: Regular rate and rhythm; No murmurs, rubs, or gallops, not reproducible  ABDOMEN: Soft, Nontender, Nondistended; Bowel sounds present  EXTREMITIES:  2+ Peripheral Pulses, No clubbing, cyanosis, or edema  PSYCH: AAOx3  NEUROLOGY: non-focal        LABS:                        10.9   7.60  )-----------( 142      ( 12 Feb 2020 06:33 )             34.6     02-12    139  |  103  |  54<H>  ----------------------------<  93  4.4   |  22  |  3.56<H>    Ca    9.6      12 Feb 2020 06:33  Mg     2.4     02-12    TPro  6.4  /  Alb  3.7  /  TBili  0.2  /  DBili  x   /  AST  15  /  ALT  12  /  AlkPhos  89  02-11    PT/INR - ( 11 Feb 2020 20:07 )   PT: 10.9 sec;   INR: 0.96 ratio         PTT - ( 11 Feb 2020 20:07 )  PTT:28.4 sec            RADIOLOGY & ADDITIONAL TESTS:    Imaging Personally Reviewed:    Electrocardiogram Personally Reviewed:    COORDINATION OF CARE:  Care Discussed with Consultants/Other Providers [Y/N]:  Prior or Outpatient Records Reviewed [Y/N]: Patient is a 79y old  Male who presents with a chief complaint of Chest Pain (12 Feb 2020 10:17)      SUBJECTIVE / OVERNIGHT EVENTS: No overnight events. Still reports 6/10 left sided chest pain, present at rest, no allievating or worsening factor, does not change with position and is not pleurtic. Denies sob, palpitation, n/v, LE swelling, recent travel    Tele reviewed: sinus 50-60      ADDITIONAL REVIEW OF SYSTEMS: Negative except for above    MEDICATIONS  (STANDING):  aspirin  chewable 81 milliGRAM(s) Oral daily  atorvastatin 80 milliGRAM(s) Oral at bedtime  carvedilol 25 milliGRAM(s) Oral every 12 hours  dextrose 5%. 1000 milliLiter(s) (50 mL/Hr) IV Continuous <Continuous>  dextrose 50% Injectable 12.5 Gram(s) IV Push once  dextrose 50% Injectable 25 Gram(s) IV Push once  dextrose 50% Injectable 25 Gram(s) IV Push once  furosemide    Tablet 40 milliGRAM(s) Oral daily  hydrALAZINE 100 milliGRAM(s) Oral every 8 hours  insulin lispro (HumaLOG) corrective regimen sliding scale   SubCutaneous three times a day before meals  insulin lispro (HumaLOG) corrective regimen sliding scale   SubCutaneous at bedtime  isosorbide   mononitrate ER Tablet (IMDUR) 90 milliGRAM(s) Oral daily  levothyroxine 175 MICROGram(s) Oral daily  lisinopril 10 milliGRAM(s) Oral daily  pantoprazole    Tablet 40 milliGRAM(s) Oral before breakfast  ticagrelor 90 milliGRAM(s) Oral every 12 hours    MEDICATIONS  (PRN):  dextrose 40% Gel 15 Gram(s) Oral once PRN Blood Glucose LESS THAN 70 milliGRAM(s)/deciliter  glucagon  Injectable 1 milliGRAM(s) IntraMuscular once PRN Glucose LESS THAN 70 milligrams/deciliter  nitroglycerin     SubLingual 0.4 milliGRAM(s) SubLingual once PRN Chest Pain      CAPILLARY BLOOD GLUCOSE      POCT Blood Glucose.: 119 mg/dL (12 Feb 2020 11:37)  POCT Blood Glucose.: 122 mg/dL (12 Feb 2020 08:20)  POCT Blood Glucose.: 97 mg/dL (12 Feb 2020 01:57)    I&O's Summary    12 Feb 2020 07:01  -  12 Feb 2020 13:10  --------------------------------------------------------  IN: 480 mL / OUT: 1300 mL / NET: -820 mL        PHYSICAL EXAM:  Vital Signs Last 24 Hrs  T(C): 36.5 (12 Feb 2020 11:10), Max: 36.8 (11 Feb 2020 19:34)  T(F): 97.7 (12 Feb 2020 11:10), Max: 98.2 (11 Feb 2020 19:34)  HR: 56 (12 Feb 2020 11:10) (56 - 62)  BP: 174/75 (12 Feb 2020 11:10) (150/83 - 179/92)  BP(mean): --  RR: 18 (12 Feb 2020 11:10) (18 - 18)  SpO2: 97% (12 Feb 2020 11:10) (97% - 99%)    PHYSICAL EXAM:  GENERAL: NAD, well-developed  HEAD:  Atraumatic, Normocephalic  NECK: Supple, No JVD  CHEST/LUNG: Clear to auscultation bilaterally; No wheeze  HEART: Regular rate and rhythm; No murmurs, rubs, or gallops, not reproducible  ABDOMEN: Soft, Nontender, Nondistended; Bowel sounds present  EXTREMITIES:  2+ Peripheral Pulses, No clubbing, cyanosis, or edema  PSYCH: AAOx3  NEUROLOGY: non-focal        LABS:                        10.9   7.60  )-----------( 142      ( 12 Feb 2020 06:33 )             34.6     02-12    139  |  103  |  54<H>  ----------------------------<  93  4.4   |  22  |  3.56<H>    Ca    9.6      12 Feb 2020 06:33  Mg     2.4     02-12    TPro  6.4  /  Alb  3.7  /  TBili  0.2  /  DBili  x   /  AST  15  /  ALT  12  /  AlkPhos  89  02-11    PT/INR - ( 11 Feb 2020 20:07 )   PT: 10.9 sec;   INR: 0.96 ratio         PTT - ( 11 Feb 2020 20:07 )  PTT:28.4 sec            RADIOLOGY & ADDITIONAL TESTS:    Imaging Personally Reviewed: TTE: Conclusions:  Estimated ejection fraction 50%.  Akinesis of the inferolateral wall aand possibly the basal  anterolateral wall.  *** Findings similar to those described in previous study  4/02/2019.      Electrocardiogram Personally Reviewed:    COORDINATION OF CARE:  Care Discussed with Consultants/Other Providers [Y/N]:  Prior or Outpatient Records Reviewed [Y/N]:

## 2020-02-12 NOTE — H&P ADULT - NSICDXPASTMEDICALHX_GEN_ALL_CORE_FT
PAST MEDICAL HISTORY:  ASHD (arteriosclerotic heart disease)     CAD (coronary artery disease)     CKD (chronic kidney disease)     Depression     Diabetes     DM (diabetes mellitus)     Glaucoma     History of kidney stones     HLD (hyperlipidemia)     HTN (hypertension)     Hypothyroid     NSTEMI (non-ST elevated myocardial infarction)

## 2020-02-12 NOTE — H&P ADULT - PROBLEM SELECTOR PROBLEM 4
Type 2 diabetes mellitus with other specified complication, without long-term current use of insulin Systolic congestive heart failure, unspecified HF chronicity

## 2020-02-12 NOTE — CONSULT NOTE ADULT - SUBJECTIVE AND OBJECTIVE BOX
Cardiology Initial Consult    History of Present Illness:   78 year old Slovenian-speaking male with PMH ICM (EF 45-50%), CAD s/p PCI 2000 and CABG x2V, NSTEMI 4/2019 s/p PCI x2 to LAD and staged PCI to OM1 on 5/2019, CKD IV, HTN, DM2 presents for chest pain. Pt's daughter states pt has been having intermittent L sided chest pressure and pain over the past 4 days. Somewhat worsened with exertion. Has been in bed mostly for past 4 days due fear of pain. Today, pain persisted longer than usual and daughter decided to bring pt to ER for further evaluation. Denies missing any medications. Not obvious worse with palpation. Pt received ASA and nitro on way to hospital. Nitro improved pt's pain symptoms. Pt still has small amount of residual pain largely improved. Endorses some mild SOB as well with pain. Endorses medication compliance. States current LE edema is baseline. Denies any recent travel, fevers, chills cough.    Pt regularly follows Cardiologist Dr. Akira Wilson - last seen in office 1/29. Home meds: DAPT (ASA, brilinta), statin (atorva 80), coreg 25 bid, hydralazine 100 TID lisinopril 10mg daily        PAST MEDICAL & SURGICAL HISTORY:  CKD (chronic kidney disease)  NSTEMI (non-ST elevated myocardial infarction)  History of kidney stones  Glaucoma  DM (diabetes mellitus)  CAD (coronary artery disease)  HLD (hyperlipidemia)  ASHD (arteriosclerotic heart disease)  Hypothyroid  Depression  Diabetes  HTN (hypertension)  History of left heart catheterization (LHC): 2000  and 04/2019  History of open heart surgery: CABG x 2V      FAMILY HISTORY:  No pertinent family history in first degree relatives      SOCIAL HISTORY:     Home Medications:  aspirin 81 mg oral delayed release tablet: 1 tab(s) orally once a day (12 Feb 2020 00:48)  atorvastatin 10 mg oral tablet: 1 tab(s) orally once a day (12 Feb 2020 00:48)  Lasix 40 mg oral tablet: 1 tab(s) orally once a day, As Needed  for weight gain &gt;2lbs  (12 Feb 2020 00:48)  latanoprost 0.005% ophthalmic solution: 1 drop(s) to each affected eye once a day (at bedtime) (12 Feb 2020 00:48)  levothyroxine 175 mcg (0.175 mg) oral tablet: 1 tab(s) orally once a day (12 Feb 2020 00:48)  lisinopril 10 mg oral tablet: 1 tab(s) orally once a day (12 Feb 2020 00:48)  omeprazole 20 mg oral delayed release capsule: 1 cap(s) orally once a day (12 Feb 2020 00:48)  Simbrinza 1%- 0.2% ophthalmic suspension: 1 drop(s) to each affected eye 2 times a day (12 Feb 2020 00:48)      CURRENT MEDICATIONS:   MEDICATIONS  (STANDING):  aspirin  chewable 81 milliGRAM(s) Oral daily  atorvastatin 10 milliGRAM(s) Oral at bedtime  carvedilol 12.5 milliGRAM(s) Oral every 12 hours  dextrose 5%. 1000 milliLiter(s) (50 mL/Hr) IV Continuous <Continuous>  dextrose 50% Injectable 12.5 Gram(s) IV Push once  dextrose 50% Injectable 25 Gram(s) IV Push once  dextrose 50% Injectable 25 Gram(s) IV Push once  furosemide    Tablet 40 milliGRAM(s) Oral daily  hydrALAZINE 100 milliGRAM(s) Oral every 8 hours  insulin lispro (HumaLOG) corrective regimen sliding scale   SubCutaneous three times a day before meals  insulin lispro (HumaLOG) corrective regimen sliding scale   SubCutaneous at bedtime  isosorbide   mononitrate ER Tablet (IMDUR) 90 milliGRAM(s) Oral daily  levothyroxine 175 MICROGram(s) Oral daily  pantoprazole    Tablet 40 milliGRAM(s) Oral before breakfast  ticagrelor 90 milliGRAM(s) Oral every 12 hours    MEDICATIONS  (PRN):  dextrose 40% Gel 15 Gram(s) Oral once PRN Blood Glucose LESS THAN 70 milliGRAM(s)/deciliter  glucagon  Injectable 1 milliGRAM(s) IntraMuscular once PRN Glucose LESS THAN 70 milligrams/deciliter  nitroglycerin     SubLingual 0.4 milliGRAM(s) SubLingual once PRN Chest Pain      Allergies    No Known Drug Allergies  shellfish (Vomiting)    Intolerances        REVIEW OF SYSTEMS:   CONSTITUTIONAL: No fever, chills, fatigue, recent weight change  HEENT: No vision changes, eye pain, vertigo, sore throat  RESPIRATORY: No cough, wheezing, shortness of breath  CARDIOVASCULAR: No chest pain, palpitations, QUIROS, PND, orthopnea  GASTROINTESTINAL: No abdominal pain, nausea, vomiting, diarrhea, constipation, melena  GENITOURINARY: No hematuria, dysuria, nocturia, incontinence  MUSCULOSKELETAL: No swelling, joint pain, muscle weakness  NEUROLOGICAL: No focal weakness, paresthesias, headache, syncope  INTEGUMENTARY: No rashes, ecchymoses    Vital Signs Last 24 Hrs  T(C): 36.5 (12 Feb 2020 04:31), Max: 36.8 (11 Feb 2020 19:34)  T(F): 97.7 (12 Feb 2020 04:31), Max: 98.2 (11 Feb 2020 19:34)  HR: 60 (12 Feb 2020 04:31) (56 - 62)  BP: 168/83 (12 Feb 2020 04:31) (150/83 - 179/92)  BP(mean): --  RR: 18 (12 Feb 2020 04:31) (18 - 18)  SpO2: 98% (12 Feb 2020 04:31) (98% - 99%)    I&O's Summary      PHYSICAL EXAM:  General: Well-appearing, NAD  HEENT: PERRL, EOMI, normal oral mucosa  Neck: Supple, no JVD, normal thyroid  Lungs: CTA b/l, no wheezing, rales or rhonchi  Heart: RRR, normal S1, S2, no murmurs  Abdomen: Soft, ND, NT, normoactive bowel sounds  Skin: Warm, well-perfused, no rashes or lesions  Neurologic: A&Ox3, no focal deficits  Extremities: Peripheral pulses 2+ b/l, no edema, cyanosis or clubbing    LABS:   CBC Full  -  ( 12 Feb 2020 06:33 )  WBC Count : 7.60 K/uL  RBC Count : 3.52 M/uL  Hemoglobin : 10.9 g/dL  Hematocrit : 34.6 %  Platelet Count - Automated : 142 K/uL  Mean Cell Volume : 98.3 fl  Mean Cell Hemoglobin : 31.0 pg  Mean Cell Hemoglobin Concentration : 31.5 gm/dL  Auto Neutrophil # : 4.07 K/uL  Auto Lymphocyte # : 2.64 K/uL  Auto Monocyte # : 0.64 K/uL  Auto Eosinophil # : 0.20 K/uL  Auto Basophil # : 0.04 K/uL  Auto Neutrophil % : 53.7 %  Auto Lymphocyte % : 34.7 %  Auto Monocyte % : 8.4 %  Auto Eosinophil % : 2.6 %  Auto Basophil % : 0.5 %      02-12    139  |  103  |  54<H>  ----------------------------<  93  4.4   |  22  |  3.56<H>    Ca    9.6      12 Feb 2020 06:33  Mg     2.4     02-12    TPro  6.4  /  Alb  3.7  /  TBili  0.2  /  DBili  x   /  AST  15  /  ALT  12  /  AlkPhos  89  02-11      ProBNP: 1306      HbA1c: Hemoglobin A1C, Whole Blood: 5.7 % (02-12 @ 08:41)      Troponin: Troponin T, High Sensitivity Result: 40 ng/L (02-11-20 @ 23:31)  Troponin T, High Sensitivity Result: 40 ng/L (02-11-20 @ 21:42)  Troponin T, High Sensitivity Result: 39 ng/L (02-11-20 @ 20:07)      TELEMETRY:     ECG:     RADIOLOGY:    PREVIOUS DIAGNOSTIC TESTING:    Echocardiogram:  TTE 4/2/2019    Conclusions:  1. Mitral valve not well visualized, probably normal.  Minimal mitral regurgitation.  2. Normal trileaflet aortic valve. No aortic valve  regurgitation seen.  3. Mild segmental left ventricular systolic dysfunction.  The mid inferolateral wall, the basal inferolateral wall,  the basal anterolateral wall, and the mid anterolateral  wall are hypokinetic.  4. Mild diastolic dysfunction (Stage I).  5. Normal right ventricular size and function.    Catheterization:          [facility] 5/6/2019 . MACI to OM1     [facility] 5/6/2019    RA 6  RV 39/12  PA 38/14/21  PCWP 9   PA 63  CO 6.36/CI 2.75   SVR 1182     [facility] 4/1/2019     LM: -- Distal left main: Angiography showed minor luminal irregularities  with no flow limiting lesions.  LAD: -- Proximal LAD: There was a diffuse 100 % stenosis. This is a  likely culprit for the patient's clinical presentation.  -- D1: There was a tubular 90 % stenosis.  -- D2: There was a discrete 90 % stenosis at the ostium of the vessel  segment.  CX: -- Proximal circumflex: There was a discrete 80 % stenosis.  -- OM1: Angiography showed minor luminal irregularities with no flow  limiting lesions.  -- OM2: Angiography showed minor luminal irregularities with no flow  limiting lesions.  RCA: -- Proximal RCA: Angiography showed minor luminal irregularities  with no flow limiting lesions.  -- Mid RCA: Angiography showed mild atherosclerosis with no flow limiting  lesions.  -- RPLS: Angiography showed minor luminal irregularities with no flow  limiting lesions.  COMPLICATIONS: There were no complications.  DIAGNOSTIC IMPRESSIONS: Severe CAD. Occluded LAD. Severe diag and prox LCx  disesae. No LV gram due to severe CKD.  INTERVENTIONAL RECOMMENDATIONS: Aspirin and Brilinta . Cardiology Initial Consult    History of Present Illness:   78 year old Emirati-speaking male with PMH ICM (EF 45-50%), CAD s/p PCI 2000 and CABG x2V, NSTEMI 4/2019 s/p PCI x2 to LAD and staged PCI to OM1 on 5/2019, CKD IV, HTN, DM2 presents for chest pain. Pt's daughter states pt has been having intermittent L sided chest pressure and pain over the past 4 days. Somewhat worsened with exertion. Has been in bed mostly for past 4 days due fear of pain. Today, pain persisted longer than usual and daughter decided to bring pt to ER for further evaluation. Denies missing any medications. Not obvious worse with palpation. Pt received ASA and nitro on way to hospital. Nitro improved pt's pain symptoms. Pt still has small amount of residual pain largely improved. Endorses some mild SOB as well with pain. Endorses medication compliance. States current LE edema is baseline. Denies any recent travel, fevers, chills cough.    Pt regularly follows Cardiologist Dr. Akira Wilson - last seen in office 1/29. Home meds: DAPT (ASA, brilinta), statin (atorva 80), coreg 25 bid, hydralazine 100 TID lisinopril 10mg daily        PAST MEDICAL & SURGICAL HISTORY:  CKD (chronic kidney disease)  NSTEMI (non-ST elevated myocardial infarction)  History of kidney stones  Glaucoma  DM (diabetes mellitus)  CAD (coronary artery disease)  HLD (hyperlipidemia)  ASHD (arteriosclerotic heart disease)  Hypothyroid  Depression  Diabetes  HTN (hypertension)  History of left heart catheterization (LHC): 2000  and 04/2019  History of open heart surgery: CABG x 2V      FAMILY HISTORY:  No pertinent family history in first degree relatives      SOCIAL HISTORY: former pipe smoker, quit 2 months ago. Occasional Etoh.     Home Medications:  aspirin 81 mg oral delayed release tablet: 1 tab(s) orally once a day (12 Feb 2020 00:48)  atorvastatin 10 mg oral tablet: 1 tab(s) orally once a day (12 Feb 2020 00:48)  Lasix 40 mg oral tablet: 1 tab(s) orally once a day, As Needed  for weight gain &gt;2lbs  (12 Feb 2020 00:48)  latanoprost 0.005% ophthalmic solution: 1 drop(s) to each affected eye once a day (at bedtime) (12 Feb 2020 00:48)  levothyroxine 175 mcg (0.175 mg) oral tablet: 1 tab(s) orally once a day (12 Feb 2020 00:48)  lisinopril 10 mg oral tablet: 1 tab(s) orally once a day (12 Feb 2020 00:48)  omeprazole 20 mg oral delayed release capsule: 1 cap(s) orally once a day (12 Feb 2020 00:48)  Simbrinza 1%- 0.2% ophthalmic suspension: 1 drop(s) to each affected eye 2 times a day (12 Feb 2020 00:48)      CURRENT MEDICATIONS:   MEDICATIONS  (STANDING):  aspirin  chewable 81 milliGRAM(s) Oral daily  atorvastatin 10 milliGRAM(s) Oral at bedtime  carvedilol 12.5 milliGRAM(s) Oral every 12 hours  dextrose 5%. 1000 milliLiter(s) (50 mL/Hr) IV Continuous <Continuous>  dextrose 50% Injectable 12.5 Gram(s) IV Push once  dextrose 50% Injectable 25 Gram(s) IV Push once  dextrose 50% Injectable 25 Gram(s) IV Push once  furosemide    Tablet 40 milliGRAM(s) Oral daily  hydrALAZINE 100 milliGRAM(s) Oral every 8 hours  insulin lispro (HumaLOG) corrective regimen sliding scale   SubCutaneous three times a day before meals  insulin lispro (HumaLOG) corrective regimen sliding scale   SubCutaneous at bedtime  isosorbide   mononitrate ER Tablet (IMDUR) 90 milliGRAM(s) Oral daily  levothyroxine 175 MICROGram(s) Oral daily  pantoprazole    Tablet 40 milliGRAM(s) Oral before breakfast  ticagrelor 90 milliGRAM(s) Oral every 12 hours    MEDICATIONS  (PRN):  dextrose 40% Gel 15 Gram(s) Oral once PRN Blood Glucose LESS THAN 70 milliGRAM(s)/deciliter  glucagon  Injectable 1 milliGRAM(s) IntraMuscular once PRN Glucose LESS THAN 70 milligrams/deciliter  nitroglycerin     SubLingual 0.4 milliGRAM(s) SubLingual once PRN Chest Pain      Allergies    No Known Drug Allergies  shellfish (Vomiting)    Intolerances        REVIEW OF SYSTEMS:   CONSTITUTIONAL: No fever, chills, fatigue, recent weight change  HEENT: No vision changes, eye pain, vertigo, sore throat  RESPIRATORY: No cough, wheezing, shortness of breath  CARDIOVASCULAR: + chest pain. No palpitations, QUIROS, PND, orthopnea  GASTROINTESTINAL: No abdominal pain, nausea, vomiting, diarrhea, constipation, melena  GENITOURINARY: No hematuria, dysuria, nocturia, incontinence  MUSCULOSKELETAL: No swelling, joint pain, muscle weakness  NEUROLOGICAL: No focal weakness, paresthesias, headache, syncope  INTEGUMENTARY: No rashes, ecchymoses    Vital Signs Last 24 Hrs  T(C): 36.5 (12 Feb 2020 04:31), Max: 36.8 (11 Feb 2020 19:34)  T(F): 97.7 (12 Feb 2020 04:31), Max: 98.2 (11 Feb 2020 19:34)  HR: 60 (12 Feb 2020 04:31) (56 - 62)  BP: 168/83 (12 Feb 2020 04:31) (150/83 - 179/92)  BP(mean): --  RR: 18 (12 Feb 2020 04:31) (18 - 18)  SpO2: 98% (12 Feb 2020 04:31) (98% - 99%)        PHYSICAL EXAM:  General: Well-appearing, NAD  HEENT: PERRL, EOMI, normal oral mucosa  Neck: Supple, no JVD, normal thyroid  Lungs: CTA b/l, no wheezing, rales or rhonchi  Heart: RRR, normal S1, S2, no murmurs  Abdomen: Soft, ND, NT, normoactive bowel sounds  Skin: Warm, well-perfused, no rashes or lesions  Neurologic: A&Ox3, no focal deficits  Extremities: Peripheral pulses 2+ b/l, no edema, cyanosis or clubbing    LABS:   CBC Full  -  ( 12 Feb 2020 06:33 )  WBC Count : 7.60 K/uL  RBC Count : 3.52 M/uL  Hemoglobin : 10.9 g/dL  Hematocrit : 34.6 %  Platelet Count - Automated : 142 K/uL  Mean Cell Volume : 98.3 fl  Mean Cell Hemoglobin : 31.0 pg  Mean Cell Hemoglobin Concentration : 31.5 gm/dL  Auto Neutrophil # : 4.07 K/uL  Auto Lymphocyte # : 2.64 K/uL  Auto Monocyte # : 0.64 K/uL  Auto Eosinophil # : 0.20 K/uL  Auto Basophil # : 0.04 K/uL  Auto Neutrophil % : 53.7 %  Auto Lymphocyte % : 34.7 %  Auto Monocyte % : 8.4 %  Auto Eosinophil % : 2.6 %  Auto Basophil % : 0.5 %      02-12    139  |  103  |  54<H>  ----------------------------<  93  4.4   |  22  |  3.56<H>    Ca    9.6      12 Feb 2020 06:33  Mg     2.4     02-12    TPro  6.4  /  Alb  3.7  /  TBili  0.2  /  DBili  x   /  AST  15  /  ALT  12  /  AlkPhos  89  02-11      ProBNP: 1306      HbA1c: Hemoglobin A1C, Whole Blood: 5.7 % (02-12 @ 08:41)      Troponin: Troponin T, High Sensitivity Result: 40 ng/L (02-11-20 @ 23:31)  Troponin T, High Sensitivity Result: 40 ng/L (02-11-20 @ 21:42)  Troponin T, High Sensitivity Result: 39 ng/L (02-11-20 @ 20:07)      TELEMETRY: sinus rhythm, HR 50-70s    ECG: sinus bradycardia. TWI in III. T wave flattening in avf, v3.       PREVIOUS DIAGNOSTIC TESTING:    Echocardiogram:  TTE 4/2/2019    Conclusions:  1. Mitral valve not well visualized, probably normal.  Minimal mitral regurgitation.  2. Normal trileaflet aortic valve. No aortic valve  regurgitation seen.  3. Mild segmental left ventricular systolic dysfunction.  The mid inferolateral wall, the basal inferolateral wall,  the basal anterolateral wall, and the mid anterolateral  wall are hypokinetic.  4. Mild diastolic dysfunction (Stage I).  5. Normal right ventricular size and function.    Catheterization:          [facility] 5/6/2019 . MACI to OM1     [facility] 5/6/2019    RA 6  RV 39/12  PA 38/14/21  PCWP 9   PA 63  CO 6.36/CI 2.75   SVR 1182     [facility] 4/1/2019     LM: -- Distal left main: Angiography showed minor luminal irregularities  with no flow limiting lesions.  LAD: -- Proximal LAD: There was a diffuse 100 % stenosis. This is a  likely culprit for the patient's clinical presentation.  -- D1: There was a tubular 90 % stenosis.  -- D2: There was a discrete 90 % stenosis at the ostium of the vessel  segment.  CX: -- Proximal circumflex: There was a discrete 80 % stenosis.  -- OM1: Angiography showed minor luminal irregularities with no flow  limiting lesions.  -- OM2: Angiography showed minor luminal irregularities with no flow  limiting lesions.  RCA: -- Proximal RCA: Angiography showed minor luminal irregularities  with no flow limiting lesions.  -- Mid RCA: Angiography showed mild atherosclerosis with no flow limiting  lesions.  -- RPLS: Angiography showed minor luminal irregularities with no flow  limiting lesions.  COMPLICATIONS: There were no complications.  DIAGNOSTIC IMPRESSIONS: Severe CAD. Occluded LAD. Severe diag and prox LCx  disesae. No LV gram due to severe CKD.  INTERVENTIONAL RECOMMENDATIONS: Aspirin and Brilinta .

## 2020-02-12 NOTE — PROGRESS NOTE ADULT - PROBLEM SELECTOR PLAN 8
1.  Name of PCP: Akira Wilson [facility]  2.  PCP Contacted on Admission: [ ] Y    [x ] N    3.  PCP contacted at Discharge: [ ] Y    [ ] N    [ ] N/A  4.  Post-Discharge Appointment Date and Location:  5.  Summary of Handoff given to PCP:

## 2020-02-12 NOTE — H&P ADULT - PROBLEM SELECTOR PLAN 6
-Trend vital signs  -Unclear if still taking amlodipine, not seen on current med list -Cont. levothyroxine

## 2020-02-13 DIAGNOSIS — R07.9 CHEST PAIN, UNSPECIFIED: ICD-10-CM

## 2020-02-13 LAB
ANION GAP SERPL CALC-SCNC: 15 MMOL/L — SIGNIFICANT CHANGE UP (ref 5–17)
APPEARANCE UR: CLEAR — SIGNIFICANT CHANGE UP
BACTERIA # UR AUTO: NEGATIVE — SIGNIFICANT CHANGE UP
BILIRUB UR-MCNC: NEGATIVE — SIGNIFICANT CHANGE UP
BUN SERPL-MCNC: 58 MG/DL — HIGH (ref 7–23)
CALCIUM SERPL-MCNC: 9.3 MG/DL — SIGNIFICANT CHANGE UP (ref 8.4–10.5)
CHLORIDE SERPL-SCNC: 103 MMOL/L — SIGNIFICANT CHANGE UP (ref 96–108)
CO2 SERPL-SCNC: 20 MMOL/L — LOW (ref 22–31)
COLOR SPEC: COLORLESS — SIGNIFICANT CHANGE UP
CREAT ?TM UR-MCNC: 30 MG/DL — SIGNIFICANT CHANGE UP
CREAT SERPL-MCNC: 3.83 MG/DL — HIGH (ref 0.5–1.3)
DIFF PNL FLD: NEGATIVE — SIGNIFICANT CHANGE UP
EPI CELLS # UR: 0 /HPF — SIGNIFICANT CHANGE UP
GLUCOSE SERPL-MCNC: 101 MG/DL — HIGH (ref 70–99)
GLUCOSE UR QL: NEGATIVE — SIGNIFICANT CHANGE UP
HCT VFR BLD CALC: 34.2 % — LOW (ref 39–50)
HGB BLD-MCNC: 10.8 G/DL — LOW (ref 13–17)
KETONES UR-MCNC: NEGATIVE — SIGNIFICANT CHANGE UP
LEUKOCYTE ESTERASE UR-ACNC: NEGATIVE — SIGNIFICANT CHANGE UP
MCHC RBC-ENTMCNC: 31.1 PG — SIGNIFICANT CHANGE UP (ref 27–34)
MCHC RBC-ENTMCNC: 31.6 GM/DL — LOW (ref 32–36)
MCV RBC AUTO: 98.6 FL — SIGNIFICANT CHANGE UP (ref 80–100)
NITRITE UR-MCNC: NEGATIVE — SIGNIFICANT CHANGE UP
NRBC # BLD: 0 /100 WBCS — SIGNIFICANT CHANGE UP (ref 0–0)
PH UR: 6 — SIGNIFICANT CHANGE UP (ref 5–8)
PLATELET # BLD AUTO: 145 K/UL — LOW (ref 150–400)
POTASSIUM SERPL-MCNC: 4.2 MMOL/L — SIGNIFICANT CHANGE UP (ref 3.5–5.3)
POTASSIUM SERPL-SCNC: 4.2 MMOL/L — SIGNIFICANT CHANGE UP (ref 3.5–5.3)
PROT ?TM UR-MCNC: 34 MG/DL — HIGH (ref 0–12)
PROT UR-MCNC: ABNORMAL
PROT/CREAT UR-RTO: 1.1 RATIO — HIGH (ref 0–0.2)
RBC # BLD: 3.47 M/UL — LOW (ref 4.2–5.8)
RBC # FLD: 13.5 % — SIGNIFICANT CHANGE UP (ref 10.3–14.5)
RBC CASTS # UR COMP ASSIST: 0 /HPF — SIGNIFICANT CHANGE UP (ref 0–4)
SODIUM SERPL-SCNC: 138 MMOL/L — SIGNIFICANT CHANGE UP (ref 135–145)
SP GR SPEC: 1.01 — LOW (ref 1.01–1.02)
UROBILINOGEN FLD QL: NEGATIVE — SIGNIFICANT CHANGE UP
WBC # BLD: 6.17 K/UL — SIGNIFICANT CHANGE UP (ref 3.8–10.5)
WBC # FLD AUTO: 6.17 K/UL — SIGNIFICANT CHANGE UP (ref 3.8–10.5)
WBC UR QL: 0 /HPF — SIGNIFICANT CHANGE UP (ref 0–5)

## 2020-02-13 PROCEDURE — 71250 CT THORAX DX C-: CPT | Mod: 26

## 2020-02-13 PROCEDURE — 99233 SBSQ HOSP IP/OBS HIGH 50: CPT | Mod: GC

## 2020-02-13 PROCEDURE — 99233 SBSQ HOSP IP/OBS HIGH 50: CPT

## 2020-02-13 PROCEDURE — 99222 1ST HOSP IP/OBS MODERATE 55: CPT | Mod: GC

## 2020-02-13 RX ORDER — FAMOTIDINE 10 MG/ML
20 INJECTION INTRAVENOUS DAILY
Refills: 0 | Status: DISCONTINUED | OUTPATIENT
Start: 2020-02-13 | End: 2020-02-14

## 2020-02-13 RX ADMIN — ISOSORBIDE MONONITRATE 90 MILLIGRAM(S): 60 TABLET, EXTENDED RELEASE ORAL at 11:57

## 2020-02-13 RX ADMIN — LISINOPRIL 10 MILLIGRAM(S): 2.5 TABLET ORAL at 05:23

## 2020-02-13 RX ADMIN — TICAGRELOR 90 MILLIGRAM(S): 90 TABLET ORAL at 05:23

## 2020-02-13 RX ADMIN — TICAGRELOR 90 MILLIGRAM(S): 90 TABLET ORAL at 17:07

## 2020-02-13 RX ADMIN — Medication 100 MILLIGRAM(S): at 05:23

## 2020-02-13 RX ADMIN — ATORVASTATIN CALCIUM 80 MILLIGRAM(S): 80 TABLET, FILM COATED ORAL at 21:05

## 2020-02-13 RX ADMIN — FAMOTIDINE 20 MILLIGRAM(S): 10 INJECTION INTRAVENOUS at 10:32

## 2020-02-13 RX ADMIN — Medication 100 MILLIGRAM(S): at 14:26

## 2020-02-13 RX ADMIN — CARVEDILOL PHOSPHATE 25 MILLIGRAM(S): 80 CAPSULE, EXTENDED RELEASE ORAL at 17:07

## 2020-02-13 RX ADMIN — HEPARIN SODIUM 5000 UNIT(S): 5000 INJECTION INTRAVENOUS; SUBCUTANEOUS at 17:07

## 2020-02-13 RX ADMIN — Medication 100 MILLIGRAM(S): at 21:05

## 2020-02-13 RX ADMIN — CARVEDILOL PHOSPHATE 25 MILLIGRAM(S): 80 CAPSULE, EXTENDED RELEASE ORAL at 08:35

## 2020-02-13 RX ADMIN — Medication 81 MILLIGRAM(S): at 11:56

## 2020-02-13 RX ADMIN — Medication 175 MICROGRAM(S): at 05:24

## 2020-02-13 RX ADMIN — AMLODIPINE BESYLATE 5 MILLIGRAM(S): 2.5 TABLET ORAL at 05:23

## 2020-02-13 RX ADMIN — PANTOPRAZOLE SODIUM 40 MILLIGRAM(S): 20 TABLET, DELAYED RELEASE ORAL at 05:24

## 2020-02-13 NOTE — PROGRESS NOTE ADULT - PROBLEM SELECTOR PLAN 4
bp improving slowly  -c/w.coreg 25mg bid, imdur 90mg daily, hydralazine 100mg  TID  -c/w amlodipine 5mg daily  -monitor pb

## 2020-02-13 NOTE — PROGRESS NOTE ADULT - PROBLEM SELECTOR PLAN 2
Lorena on CKD stage 4  -CR uptrending 3.8 today, baseline 3.0-3.5  -renal consulted  -bladder scan pending  -if renal agrees, hold ACE?

## 2020-02-13 NOTE — CONSULT NOTE ADULT - ATTENDING COMMENTS
78 year old man presents with 4 days of chest pain, pain at times more severe, but otherwise continuous. Exam unremarkable except soft systolic murmur base and faint diastolic blow. Troponin well below 99% URL and EKG not indicative of ischemia. Symptoms do not seem related to a cardiac syndrome, seek non-cardiac explanations.
ALDEN superimposed on CKD: likely secondary to hemodynamic changes in the setting of receiving diuresis  Appears euvolemic on exam  Would not diurese any more  Renal function should improve in the next 24-48 hours    Carlene Mccoy MD  O: [phone]  C: [phone]

## 2020-02-13 NOTE — CONSULT NOTE ADULT - ASSESSMENT
78 year old Macedonian-speaking male with PMH ICM (EF 45-50%), CAD s/p PCI 2000 and CABG x2V, NSTEMI 4/2019 s/p PCI x2 to LAD and staged PCI to OM1 on 5/2019, CKD IV, HTN, DM2 presents for chest pain. Nephrology consulted for ALDEN on CKD stage 4    ALDNE  CR uptrending 3.8 today, baseline 3.0-3.5  - bladder scan pending  - recommend obtaining urinalysis and urine protein/creatinine ratio.   - can continue to hold ACEi 78 year old Estonian-speaking male with PMH ICM (EF 45-50%), CAD s/p PCI 2000 and CABG x2V, NSTEMI 4/2019 s/p PCI x2 to LAD and staged PCI to OM1 on 5/2019, CKD IV, HTN, DM2 presents for chest pain. Nephrology consulted for ALDEN on CKD stage 4.     # ALDEN  Cr 3.5 on admission, 3.8 today; baseline 3.0-3.5. Pt is currently euvolemic on exam. TTE is unchanged from 2019. EKG showing NSR. Likely 2/2 lasix given on 2/12.   - bladder scan pending  - can continue lisinopril  - recommend checking serum phosphorus level  - recommend obtaining urinalysis and urine protein/creatinine ratio. 78 year old Kyrgyz-speaking male with PMH ICM (EF 45-50%), CAD s/p PCI 2000 and CABG x2V, NSTEMI 4/2019 s/p PCI x2 to LAD and staged PCI to OM1 on 5/2019, CKD stage IV, HTN, DM2 (HbA1c 5.7) presents for chest pain. Nephrology consulted for ALDEN on CKD stage 4.     # ALDEN  Cr 3.5 on admission, 3.8 today; baseline 3.0-3.5. Pt is currently euvolemic on exam. TTE is unchanged from 2019. EKG showing NSR. Likely due to hemodynamic instability 2/2 lasix given on 2/12.   - bladder scan pending  - may continue with lisinopril    # CKD stage IV  Pt regularly follows Nephrologist, Dr. Natalia Sharma. Last seen in office 1/28. Cr baseline 3.0-3.5. Likely 2/2 diabetic nephropathy  - recommend obtaining urinalysis and urine protein/creatinine ratio.   - recommend checking serum phosphorus level

## 2020-02-13 NOTE — PROGRESS NOTE ADULT - PROBLEM SELECTOR PLAN 5
EF ~50%, euvolemic  -Cont. coreg 25mg bid, imdur 90mg daily, hydralazine 100mg  TID  -c/w lisinopril 10mg daily, f/u with renal if short d/c

## 2020-02-13 NOTE — PROGRESS NOTE ADULT - PROBLEM SELECTOR PLAN 1
-EKG with minimal TW variation and trops delta minimal in setting of CKD  -TTE with EF 50% and unchanged from previous  -per cardio, no plan for cath and R>B given CKD and rec workup noncardiac causes of chest pain  - not MSK per hx and exam, +mild belching, start pepcid r/o GERD but given uncontrolled HTN, although limited and low suspicion for, will get noncon CT chest given CKD to r/o aortic aneurysm, other causes, etc  -monitor on telemetry

## 2020-02-13 NOTE — CONSULT NOTE ADULT - SUBJECTIVE AND OBJECTIVE BOX
[facility] DIVISION OF KIDNEY DISEASES AND HYPERTENSION -- INITIAL CONSULT NOTE  --------------------------------------------------------------------------------  HPI:        PAST HISTORY  --------------------------------------------------------------------------------  PAST MEDICAL & SURGICAL HISTORY:  CKD (chronic kidney disease)  NSTEMI (non-ST elevated myocardial infarction)  History of kidney stones  Glaucoma  DM (diabetes mellitus)  CAD (coronary artery disease)  HLD (hyperlipidemia)  ASHD (arteriosclerotic heart disease)  Hypothyroid  Depression  Diabetes  HTN (hypertension)  History of left heart catheterization (LHC): 2000  and 04/2019  History of open heart surgery: CABG x 2V    FAMILY HISTORY:  No pertinent family history in first degree relatives    PAST SOCIAL HISTORY:    ALLERGIES & MEDICATIONS  --------------------------------------------------------------------------------  Allergies    No Known Drug Allergies  shellfish (Vomiting)    Intolerances      Standing Inpatient Medications  amLODIPine   Tablet 5 milliGRAM(s) Oral daily  aspirin  chewable 81 milliGRAM(s) Oral daily  atorvastatin 80 milliGRAM(s) Oral at bedtime  carvedilol 25 milliGRAM(s) Oral every 12 hours  dextrose 5%. 1000 milliLiter(s) IV Continuous <Continuous>  dextrose 50% Injectable 12.5 Gram(s) IV Push once  dextrose 50% Injectable 25 Gram(s) IV Push once  dextrose 50% Injectable 25 Gram(s) IV Push once  famotidine    Tablet 20 milliGRAM(s) Oral daily  heparin  Injectable 5000 Unit(s) SubCutaneous every 12 hours  hydrALAZINE 100 milliGRAM(s) Oral every 8 hours  insulin lispro (HumaLOG) corrective regimen sliding scale   SubCutaneous three times a day before meals  insulin lispro (HumaLOG) corrective regimen sliding scale   SubCutaneous at bedtime  isosorbide   mononitrate ER Tablet (IMDUR) 90 milliGRAM(s) Oral daily  levothyroxine 175 MICROGram(s) Oral daily  lisinopril 10 milliGRAM(s) Oral daily  pantoprazole    Tablet 40 milliGRAM(s) Oral before breakfast  ticagrelor 90 milliGRAM(s) Oral every 12 hours    PRN Inpatient Medications  dextrose 40% Gel 15 Gram(s) Oral once PRN  glucagon  Injectable 1 milliGRAM(s) IntraMuscular once PRN  nitroglycerin     SubLingual 0.4 milliGRAM(s) SubLingual once PRN      REVIEW OF SYSTEMS  --------------------------------------------------------------------------------  Gen: No weight changes, fatigue, fevers/chills, weakness  Skin: No rashes  Respiratory: No dyspnea, cough, wheezing, hemoptysis  CV: No chest pain, PND, orthopnea  GI: No abdominal pain, diarrhea, constipation, nausea, vomiting, melena, hematochezia  : No increased frequency, dysuria, hematuria, nocturia  MSK: No joint pain/swelling; no back pain; no edema  Neuro: No dizziness/lightheadedness, weakness  Heme: No easy bruising or bleeding      All other systems were reviewed and are negative, except as noted.    VITALS/PHYSICAL EXAM  --------------------------------------------------------------------------------  T(C): 37 (02-13-20 @ 08:25), Max: 37 (02-13-20 @ 08:25)  HR: 63 (02-13-20 @ 08:25) (52 - 69)  BP: 156/89 (02-13-20 @ 08:25) (122/68 - 174/75)  RR: 18 (02-13-20 @ 08:25) (18 - 18)  SpO2: 95% (02-13-20 @ 08:25) (95% - 97%)  Wt(kg): --  Height (cm): 180.34 (02-11-20 @ 19:34)  Weight (kg): 90.3 (02-11-20 @ 19:34)  BMI (kg/m2): 27.8 (02-11-20 @ 19:34)  BSA (m2): 2.1 (02-11-20 @ 19:34)      02-12-20 @ 07:01  -  02-13-20 @ 07:00  --------------------------------------------------------  IN: 720 mL / OUT: 3650 mL / NET: -2930 mL    02-13-20 @ 07:01  -  02-13-20 @ 10:33  --------------------------------------------------------  IN: 280 mL / OUT: 200 mL / NET: 80 mL      Physical Exam:  	Gen: NAD, well-appearing  	HEENT: PERRL, supple neck, clear oropharynx  	Pulm: CTA B/L  	CV: RRR, S1S2; no rub  	Back: No spinal or CVA tenderness; no sacral edema  	Abd: +BS, soft, nontender/nondistended  	: No suprapubic tenderness  	LE: Warm, FROM, no clubbing, intact strength; no edema  	Skin: Warm, without rashes  	Vascular access:    LABS/STUDIES  --------------------------------------------------------------------------------              10.8   6.17  >-----------<  145      [02-13-20 @ 06:51]              34.2     138  |  103  |  58  ----------------------------<  101      [02-13-20 @ 06:51]  4.2   |  20  |  3.83        Ca     9.3     [02-13-20 @ 06:51]      Mg     2.4     [02-12-20 @ 06:33]    TPro  6.4  /  Alb  3.7  /  TBili  0.2  /  DBili  x   /  AST  15  /  ALT  12  /  AlkPhos  89  [02-11-20 @ 20:07]    PT/INR: PT 10.9 , INR 0.96       [02-11-20 @ 20:07]  PTT: 28.4       [02-11-20 @ 20:07]    CK 92      [02-12-20 @ 13:58]    Creatinine Trend:  SCr 3.83 [02-13 @ 06:51]  SCr 3.56 [02-12 @ 06:33]  SCr 3.52 [02-11 @ 20:07]    Urinalysis - [04-05-19 @ 09:22]      Color Light Yellow / Appearance Clear / SG 1.009 / pH 6.5      Gluc Negative / Ketone Negative  / Bili Negative / Urobili <2 mg/dL       Blood Negative / Protein 100 mg/dL / Leuk Est Negative / Nitrite Negative      RBC 0 / WBC 0 / Hyaline 0 / Gran  / Sq Epi  / Non Sq Epi 0 / Bacteria Negative      HbA1c 5.7      [02-12-20 @ 08:41]  TSH 37.10      [04-04-19 @ 09:22]  Lipid: chol 217, , HDL 46,       [04-01-19 @ 19:44] [facility] DIVISION OF KIDNEY DISEASES AND HYPERTENSION -- INITIAL CONSULT NOTE  --------------------------------------------------------------------------------  HPI:  78 year old [place]-speaking male with PMH ICM (EF 45-50%), CAD s/p PCI 2000 and CABG x2V, NSTEMI 4/2019 s/p PCI x2 to LAD and staged PCI to OM1 on 5/2019, CKD IV, HTN, DM2 presents for chest pain. Pt's daughter states pt has been having intermittent L sided chest pressure and pain over the past 4 days. Somewhat worsened with exertion. Has been in bed mostly for past 4 days due fear of pain. Today, pain persisted longer than usual and daughter decided to bring pt to ER for further evaluation. Denies missing any medications. Not obvious worse with palpation. Pt received ASA and nitro on way to hospital. Nitro improved pt's pain symptoms. Pt still has small amount of residual pain largely improved. Endorses some mild SOB as well with pain. Endorses medication compliance. States current LE edema is baseline. Denies any recent travel, fevers, chills cough. Pt regularly follows Cardiologist Dr. Akira Wilson - last seen in office 1/29. Home meds: DAPT (ASA, brilinta), statin (atorva 80), coreg 25 bid, hydralazine 100 TID lisinopril 10mg daily    Nephrology consulted for ALDEN on CKD. Pt seen and examined at the bedside. [place]  #[id number]. Pt reports persistent 4-5/10 left-sided chest pain that is not associated with eating or change in position.       PAST HISTORY  --------------------------------------------------------------------------------  PAST MEDICAL & SURGICAL HISTORY:  CKD (chronic kidney disease)  NSTEMI (non-ST elevated myocardial infarction)  History of kidney stones  Glaucoma  DM (diabetes mellitus)  CAD (coronary artery disease)  HLD (hyperlipidemia)  ASHD (arteriosclerotic heart disease)  Hypothyroid  Depression  Diabetes  HTN (hypertension)  History of left heart catheterization (LHC): 2000  and 04/2019  History of open heart surgery: CABG x 2V    FAMILY HISTORY:  No hx of HTN, heart disease, diabetes, hyperlipidemia    PAST SOCIAL HISTORY: Lives with daughter. ~60 year occasional smoking and EtOH use at social events. Denies illicit drug use.     ALLERGIES & MEDICATIONS  --------------------------------------------------------------------------------  Allergies    No Known Drug Allergies  shellfish (Vomiting)    Intolerances      Standing Inpatient Medications  amLODIPine   Tablet 5 milliGRAM(s) Oral daily  aspirin  chewable 81 milliGRAM(s) Oral daily  atorvastatin 80 milliGRAM(s) Oral at bedtime  carvedilol 25 milliGRAM(s) Oral every 12 hours  dextrose 5%. 1000 milliLiter(s) IV Continuous <Continuous>  dextrose 50% Injectable 12.5 Gram(s) IV Push once  dextrose 50% Injectable 25 Gram(s) IV Push once  dextrose 50% Injectable 25 Gram(s) IV Push once  famotidine    Tablet 20 milliGRAM(s) Oral daily  heparin  Injectable 5000 Unit(s) SubCutaneous every 12 hours  hydrALAZINE 100 milliGRAM(s) Oral every 8 hours  insulin lispro (HumaLOG) corrective regimen sliding scale   SubCutaneous three times a day before meals  insulin lispro (HumaLOG) corrective regimen sliding scale   SubCutaneous at bedtime  isosorbide   mononitrate ER Tablet (IMDUR) 90 milliGRAM(s) Oral daily  levothyroxine 175 MICROGram(s) Oral daily  lisinopril 10 milliGRAM(s) Oral daily  pantoprazole    Tablet 40 milliGRAM(s) Oral before breakfast  ticagrelor 90 milliGRAM(s) Oral every 12 hours    PRN Inpatient Medications  dextrose 40% Gel 15 Gram(s) Oral once PRN  glucagon  Injectable 1 milliGRAM(s) IntraMuscular once PRN  nitroglycerin     SubLingual 0.4 milliGRAM(s) SubLingual once PRN      REVIEW OF SYSTEMS  --------------------------------------------------------------------------------  Gen: No weight changes, fatigue, fevers/chills, weakness  Skin: No rashes  Respiratory: No dyspnea, cough, wheezing, hemoptysis  CV: + chest pain, No PND, orthopnea  GI: No abdominal pain, diarrhea, constipation, nausea, vomiting, melena, hematochezia  : No increased frequency, dysuria, hematuria, nocturia  MSK: No joint pain/swelling; no back pain; no edema  Neuro: No dizziness/lightheadedness, weakness  Heme: No easy bruising or bleeding      VITALS/PHYSICAL EXAM  --------------------------------------------------------------------------------  T(C): 37 (02-13-20 @ 08:25), Max: 37 (02-13-20 @ 08:25)  HR: 63 (02-13-20 @ 08:25) (52 - 69)  BP: 156/89 (02-13-20 @ 08:25) (122/68 - 174/75)  RR: 18 (02-13-20 @ 08:25) (18 - 18)  SpO2: 95% (02-13-20 @ 08:25) (95% - 97%)  Height (cm): 180.34 (02-11-20 @ 19:34)  Weight (kg): 90.3 (02-11-20 @ 19:34)  BMI (kg/m2): 27.8 (02-11-20 @ 19:34)  BSA (m2): 2.1 (02-11-20 @ 19:34)      02-12-20 @ 07:01  -  02-13-20 @ 07:00  --------------------------------------------------------  IN: 720 mL / OUT: 3650 mL / NET: -2930 mL    02-13-20 @ 07:01  -  02-13-20 @ 10:33  --------------------------------------------------------  IN: 280 mL / OUT: 200 mL / NET: 80 mL      Physical Exam:  	Gen: NAD, well-appearing  	HEENT: PERRL, supple neck, clear oropharynx  	Pulm: CTA B/L  	CV: RRR, S1S2; no rub  	Back: No spinal or CVA tenderness; no sacral edema  	Abd: +BS, soft, nontender/nondistended  	: No suprapubic tenderness  	LE: Warm, FROM, no clubbing, intact strength; no edema  	Skin: Warm, without rashes  	Vascular access:    LABS/STUDIES  --------------------------------------------------------------------------------              10.8   6.17  >-----------<  145      [02-13-20 @ 06:51]              34.2     138  |  103  |  58  ----------------------------<  101      [02-13-20 @ 06:51]  4.2   |  20  |  3.83        Ca     9.3     [02-13-20 @ 06:51]      Mg     2.4     [02-12-20 @ 06:33]    TPro  6.4  /  Alb  3.7  /  TBili  0.2  /  DBili  x   /  AST  15  /  ALT  12  /  AlkPhos  89  [02-11-20 @ 20:07]    PT/INR: PT 10.9 , INR 0.96       [02-11-20 @ 20:07]  PTT: 28.4       [02-11-20 @ 20:07]    CK 92      [02-12-20 @ 13:58]    Creatinine Trend:  SCr 3.83 [02-13 @ 06:51]  SCr 3.56 [02-12 @ 06:33]  SCr 3.52 [02-11 @ 20:07]    Urinalysis - [04-05-19 @ 09:22]      Color Light Yellow / Appearance Clear / SG 1.009 / pH 6.5      Gluc Negative / Ketone Negative  / Bili Negative / Urobili <2 mg/dL       Blood Negative / Protein 100 mg/dL / Leuk Est Negative / Nitrite Negative      RBC 0 / WBC 0 / Hyaline 0 / Gran  / Sq Epi  / Non Sq Epi 0 / Bacteria Negative      HbA1c 5.7      [02-12-20 @ 08:41]  TSH 37.10      [04-04-19 @ 09:22]  Lipid: chol 217, , HDL 46,       [04-01-19 @ 19:44] [facility] DIVISION OF KIDNEY DISEASES AND HYPERTENSION -- INITIAL CONSULT NOTE  --------------------------------------------------------------------------------  HPI:  78 year old [place]er-speaking male with PMH ICM (EF 45-50%), CAD s/p PCI 2000 and CABG x2V, NSTEMI 4/2019 s/p PCI x2 to LAD and staged PCI to OM1 on 5/2019, CKD IV, HTN, DM2 presents for chest pain. Pt's daughter states pt has been having intermittent L sided chest pressure and pain over the past 4 days. Somewhat worsened with exertion. Has been in bed mostly for past 4 days due fear of pain. Today, pain persisted longer than usual and daughter decided to bring pt to ER for further evaluation. Denies missing any medications. Not obvious worse with palpation. Pt received ASA and nitro on way to hospital. Nitro improved pt's pain symptoms. Pt still has small amount of residual pain largely improved. Endorses some mild SOB as well with pain. Endorses medication compliance. States current LE edema is baseline. Denies any recent travel, fevers, chills cough. Pt regularly follows Cardiologist Dr. Akira Wilson - last seen in office 1/29. Home meds: DAPT (ASA, brilinta), statin (atorva 80), coreg 25 bid, hydralazine 100 TID, lisinopril 10mg daily    Nephrology consulted for ALDEN on CKD. Pt seen and examined at the bedside. [place]  #[id number]. Pt reports persistent 4-5/10 left-sided chest pain that is not associated with eating or change in position.  Pt regularly follows Nephrologist, Dr. Natalia Sharma. Last seen in office 1/28.       PAST HISTORY  --------------------------------------------------------------------------------  PAST MEDICAL & SURGICAL HISTORY:  CKD (chronic kidney disease)  NSTEMI (non-ST elevated myocardial infarction)  History of kidney stones  Glaucoma  DM (diabetes mellitus)  CAD (coronary artery disease)  HLD (hyperlipidemia)  ASHD (arteriosclerotic heart disease)  Hypothyroid  Depression  Diabetes  HTN (hypertension)  History of left heart catheterization (LHC): 2000  and 04/2019  History of open heart surgery: CABG x 2V    FAMILY HISTORY:  No hx of HTN, heart disease, diabetes, hyperlipidemia    PAST SOCIAL HISTORY: Lives with daughter. ~60 year occasional smoking and EtOH use at social events. Denies illicit drug use.     ALLERGIES & MEDICATIONS  --------------------------------------------------------------------------------  Allergies    No Known Drug Allergies  shellfish (Vomiting)    Intolerances      Standing Inpatient Medications  amLODIPine   Tablet 5 milliGRAM(s) Oral daily  aspirin  chewable 81 milliGRAM(s) Oral daily  atorvastatin 80 milliGRAM(s) Oral at bedtime  carvedilol 25 milliGRAM(s) Oral every 12 hours  dextrose 5%. 1000 milliLiter(s) IV Continuous <Continuous>  dextrose 50% Injectable 12.5 Gram(s) IV Push once  dextrose 50% Injectable 25 Gram(s) IV Push once  dextrose 50% Injectable 25 Gram(s) IV Push once  famotidine    Tablet 20 milliGRAM(s) Oral daily  heparin  Injectable 5000 Unit(s) SubCutaneous every 12 hours  hydrALAZINE 100 milliGRAM(s) Oral every 8 hours  insulin lispro (HumaLOG) corrective regimen sliding scale   SubCutaneous three times a day before meals  insulin lispro (HumaLOG) corrective regimen sliding scale   SubCutaneous at bedtime  isosorbide   mononitrate ER Tablet (IMDUR) 90 milliGRAM(s) Oral daily  levothyroxine 175 MICROGram(s) Oral daily  lisinopril 10 milliGRAM(s) Oral daily  pantoprazole    Tablet 40 milliGRAM(s) Oral before breakfast  ticagrelor 90 milliGRAM(s) Oral every 12 hours    PRN Inpatient Medications  dextrose 40% Gel 15 Gram(s) Oral once PRN  glucagon  Injectable 1 milliGRAM(s) IntraMuscular once PRN  nitroglycerin     SubLingual 0.4 milliGRAM(s) SubLingual once PRN      REVIEW OF SYSTEMS  --------------------------------------------------------------------------------  Gen: No weight changes, fatigue, fevers/chills, weakness  Skin: No rashes  Respiratory: No dyspnea, cough, wheezing, hemoptysis  CV: + chest pain, No PND, orthopnea  GI: No abdominal pain, diarrhea, constipation, nausea, vomiting, melena, hematochezia  : No increased frequency, dysuria, hematuria, nocturia  MSK: No joint pain/swelling; no back pain; no edema  Neuro: No dizziness/lightheadedness, weakness  Heme: No easy bruising or bleeding      VITALS/PHYSICAL EXAM  --------------------------------------------------------------------------------  T(C): 37 (02-13-20 @ 08:25), Max: 37 (02-13-20 @ 08:25)  HR: 63 (02-13-20 @ 08:25) (52 - 69)  BP: 156/89 (02-13-20 @ 08:25) (122/68 - 174/75)  RR: 18 (02-13-20 @ 08:25) (18 - 18)  SpO2: 95% (02-13-20 @ 08:25) (95% - 97%)  Height (cm): 180.34 (02-11-20 @ 19:34)  Weight (kg): 90.3 (02-11-20 @ 19:34)  BMI (kg/m2): 27.8 (02-11-20 @ 19:34)  BSA (m2): 2.1 (02-11-20 @ 19:34)      02-12-20 @ 07:01  -  02-13-20 @ 07:00  --------------------------------------------------------  IN: 720 mL / OUT: 3650 mL / NET: -2930 mL    02-13-20 @ 07:01  -  02-13-20 @ 10:33  --------------------------------------------------------  IN: 280 mL / OUT: 200 mL / NET: 80 mL      Physical Exam:  	Gen: NAD, well-appearing  	HEENT: PERRL, supple neck, clear oropharynx  	Pulm: CTA B/L  	CV: RRR, S1S2; no rub  	Back: No spinal or CVA tenderness; no sacral edema  	Abd: +BS, soft, nontender/nondistended  	: No suprapubic tenderness  	LE: Warm, FROM, no clubbing, intact strength; no edema  	Skin: Warm, without rashes  	Vascular access:    LABS/STUDIES  --------------------------------------------------------------------------------              10.8   6.17  >-----------<  145      [02-13-20 @ 06:51]              34.2     138  |  103  |  58  ----------------------------<  101      [02-13-20 @ 06:51]  4.2   |  20  |  3.83        Ca     9.3     [02-13-20 @ 06:51]      Mg     2.4     [02-12-20 @ 06:33]    TPro  6.4  /  Alb  3.7  /  TBili  0.2  /  DBili  x   /  AST  15  /  ALT  12  /  AlkPhos  89  [02-11-20 @ 20:07]    PT/INR: PT 10.9 , INR 0.96       [02-11-20 @ 20:07]  PTT: 28.4       [02-11-20 @ 20:07]    CK 92      [02-12-20 @ 13:58]    Creatinine Trend:  SCr 3.83 [02-13 @ 06:51]  SCr 3.56 [02-12 @ 06:33]  SCr 3.52 [02-11 @ 20:07]    Urinalysis - [04-05-19 @ 09:22]      Color Light Yellow / Appearance Clear / SG 1.009 / pH 6.5      Gluc Negative / Ketone Negative  / Bili Negative / Urobili <2 mg/dL       Blood Negative / Protein 100 mg/dL / Leuk Est Negative / Nitrite Negative      RBC 0 / WBC 0 / Hyaline 0 / Gran  / Sq Epi  / Non Sq Epi 0 / Bacteria Negative      HbA1c 5.7      [02-12-20 @ 08:41]  TSH 37.10      [04-04-19 @ 09:22]  Lipid: chol 217, , HDL 46,       [04-01-19 @ 19:44]

## 2020-02-13 NOTE — PROGRESS NOTE ADULT - SUBJECTIVE AND OBJECTIVE BOX
Patient is a 79y old  Male who presents with a chief complaint of Chest Pain (13 Feb 2020 10:32)      SUBJECTIVE / OVERNIGHT EVENTS: No overnight events. Still with 4/10 left sided chest pain at rest, nonpleuritic, does not change with exertion or movement. +occaasional belching but denies sour taste in mouth. Denies sob, headache, palpitations, n/v, dizziness.     Tele reviewed: sinus 50-60      ADDITIONAL REVIEW OF SYSTEMS: Negative except for above    MEDICATIONS  (STANDING):  amLODIPine   Tablet 5 milliGRAM(s) Oral daily  aspirin  chewable 81 milliGRAM(s) Oral daily  atorvastatin 80 milliGRAM(s) Oral at bedtime  carvedilol 25 milliGRAM(s) Oral every 12 hours  dextrose 5%. 1000 milliLiter(s) (50 mL/Hr) IV Continuous <Continuous>  dextrose 50% Injectable 12.5 Gram(s) IV Push once  dextrose 50% Injectable 25 Gram(s) IV Push once  dextrose 50% Injectable 25 Gram(s) IV Push once  famotidine    Tablet 20 milliGRAM(s) Oral daily  heparin  Injectable 5000 Unit(s) SubCutaneous every 12 hours  hydrALAZINE 100 milliGRAM(s) Oral every 8 hours  insulin lispro (HumaLOG) corrective regimen sliding scale   SubCutaneous three times a day before meals  insulin lispro (HumaLOG) corrective regimen sliding scale   SubCutaneous at bedtime  isosorbide   mononitrate ER Tablet (IMDUR) 90 milliGRAM(s) Oral daily  levothyroxine 175 MICROGram(s) Oral daily  lisinopril 10 milliGRAM(s) Oral daily  pantoprazole    Tablet 40 milliGRAM(s) Oral before breakfast  ticagrelor 90 milliGRAM(s) Oral every 12 hours    MEDICATIONS  (PRN):  dextrose 40% Gel 15 Gram(s) Oral once PRN Blood Glucose LESS THAN 70 milliGRAM(s)/deciliter  glucagon  Injectable 1 milliGRAM(s) IntraMuscular once PRN Glucose LESS THAN 70 milligrams/deciliter  nitroglycerin     SubLingual 0.4 milliGRAM(s) SubLingual once PRN Chest Pain      CAPILLARY BLOOD GLUCOSE      POCT Blood Glucose.: 92 mg/dL (13 Feb 2020 11:37)  POCT Blood Glucose.: 107 mg/dL (13 Feb 2020 07:34)  POCT Blood Glucose.: 114 mg/dL (12 Feb 2020 21:10)  POCT Blood Glucose.: 149 mg/dL (12 Feb 2020 16:33)    I&O's Summary    12 Feb 2020 07:01  -  13 Feb 2020 07:00  --------------------------------------------------------  IN: 720 mL / OUT: 3650 mL / NET: -2930 mL    13 Feb 2020 07:01  -  13 Feb 2020 12:03  --------------------------------------------------------  IN: 280 mL / OUT: 200 mL / NET: 80 mL        PHYSICAL EXAM:  Vital Signs Last 24 Hrs  T(C): 36.4 (13 Feb 2020 12:00), Max: 37 (13 Feb 2020 08:25)  T(F): 97.6 (13 Feb 2020 12:00), Max: 98.6 (13 Feb 2020 08:25)  HR: 54 (13 Feb 2020 12:00) (52 - 69)  BP: 159/79 (13 Feb 2020 12:00) (122/68 - 159/79)  BP(mean): --  RR: 18 (13 Feb 2020 12:00) (18 - 18)  SpO2: 98% (13 Feb 2020 12:00) (95% - 98%)    PHYSICAL EXAM:  GENERAL: NAD, well-developed  HEAD:  Atraumatic, Normocephalic  NECK: Supple, No JVD  CHEST/LUNG: Clear to auscultation bilaterally; No wheeze. nontender chest  HEART: Regular rate and rhythm;   ABDOMEN: Soft, Nontender, Nondistended; Bowel sounds present  EXTREMITIES:  2+ Peripheral Pulses, No clubbing, cyanosis, or edema  PSYCH: AAOx3  NEUROLOGY: non-focal        LABS:                        10.8   6.17  )-----------( 145      ( 13 Feb 2020 06:51 )             34.2     02-13    138  |  103  |  58<H>  ----------------------------<  101<H>  4.2   |  20<L>  |  3.83<H>    Ca    9.3      13 Feb 2020 06:51  Mg     2.4     02-12    TPro  6.4  /  Alb  3.7  /  TBili  0.2  /  DBili  x   /  AST  15  /  ALT  12  /  AlkPhos  89  02-11    PT/INR - ( 11 Feb 2020 20:07 )   PT: 10.9 sec;   INR: 0.96 ratio         PTT - ( 11 Feb 2020 20:07 )  PTT:28.4 sec  CARDIAC MARKERS ( 12 Feb 2020 13:58 )  x     / x     / 92 U/L / x     / 2.6 ng/mL            RADIOLOGY & ADDITIONAL TESTS:    Imaging Personally Reviewed:    Electrocardiogram Personally Reviewed:    COORDINATION OF CARE:  Care Discussed with Consultants/Other Providers [Y/N]:  Prior or Outpatient Records Reviewed [Y/N]:

## 2020-02-13 NOTE — PROGRESS NOTE ADULT - SUBJECTIVE AND OBJECTIVE BOX
Patient is a 79y old  Male who presents with a chief complaint of Chest Pain (12 Feb 2020 13:10)      INTERVAL HPI/OVERNIGHT EVENTS:  No acute event overnight. Continues to have mild pressure chest pain. Denies abdominal pain, n/v, SOB. No palpitations.   Tele: sinus 50-70.     MEDICATIONS  (STANDING):  amLODIPine   Tablet 5 milliGRAM(s) Oral daily  aspirin  chewable 81 milliGRAM(s) Oral daily  atorvastatin 80 milliGRAM(s) Oral at bedtime  carvedilol 25 milliGRAM(s) Oral every 12 hours  dextrose 5%. 1000 milliLiter(s) (50 mL/Hr) IV Continuous <Continuous>  dextrose 50% Injectable 12.5 Gram(s) IV Push once  dextrose 50% Injectable 25 Gram(s) IV Push once  dextrose 50% Injectable 25 Gram(s) IV Push once  heparin  Injectable 5000 Unit(s) SubCutaneous every 12 hours  hydrALAZINE 100 milliGRAM(s) Oral every 8 hours  insulin lispro (HumaLOG) corrective regimen sliding scale   SubCutaneous three times a day before meals  insulin lispro (HumaLOG) corrective regimen sliding scale   SubCutaneous at bedtime  isosorbide   mononitrate ER Tablet (IMDUR) 90 milliGRAM(s) Oral daily  levothyroxine 175 MICROGram(s) Oral daily  lisinopril 10 milliGRAM(s) Oral daily  pantoprazole    Tablet 40 milliGRAM(s) Oral before breakfast  ticagrelor 90 milliGRAM(s) Oral every 12 hours    MEDICATIONS  (PRN):  dextrose 40% Gel 15 Gram(s) Oral once PRN Blood Glucose LESS THAN 70 milliGRAM(s)/deciliter  glucagon  Injectable 1 milliGRAM(s) IntraMuscular once PRN Glucose LESS THAN 70 milligrams/deciliter  nitroglycerin     SubLingual 0.4 milliGRAM(s) SubLingual once PRN Chest Pain      Allergies    No Known Drug Allergies  shellfish (Vomiting)    Intolerances        Vital Signs Last 24 Hrs  T(C): 37 (13 Feb 2020 08:25), Max: 37 (13 Feb 2020 08:25)  T(F): 98.6 (13 Feb 2020 08:25), Max: 98.6 (13 Feb 2020 08:25)  HR: 63 (13 Feb 2020 08:25) (52 - 69)  BP: 156/89 (13 Feb 2020 08:25) (122/68 - 174/75)  BP(mean): --  RR: 18 (13 Feb 2020 08:25) (18 - 18)  SpO2: 95% (13 Feb 2020 08:25) (95% - 97%)    PHYSICAL EXAM:  GENERAL: NAD. sitting on chair, out of bed.  CHEST/LUNG: Clear to auscultation; No rales, rhonchi, or wheezing.  Respiratory effort does not appear labored.  HEART: Regular rate and rhythm; S1 and S2,  no murmurs, rubs, or gallops.  ABDOMEN: Soft, not tender to palpation.  No masses or HSM appreciated.  No distension.  Bowel sounds present.  EXTREMITIES:  No clubbing, cyanosis, or edema.  Moves all extremities with strength 5/5.  SKIN: No obvious rashes or lesions.  Turgor okay.  NEURO:  Alert and oriented x 3, no focal sensory or  motor deficit    LABS:                        10.8   6.17  )-----------( 145      ( 13 Feb 2020 06:51 )             34.2     02-13    138  |  103  |  58<H>  ----------------------------<  101<H>  4.2   |  20<L>  |  3.83<H>    Ca    9.3      13 Feb 2020 06:51  Mg     2.4     02-12    Trop: 39 -> 40 -> 40 -> 49 -> 51     CAPILLARY BLOOD GLUCOSE      POCT Blood Glucose.: 107 mg/dL (13 Feb 2020 07:34)  POCT Blood Glucose.: 114 mg/dL (12 Feb 2020 21:10)  POCT Blood Glucose.: 149 mg/dL (12 Feb 2020 16:33)  POCT Blood Glucose.: 119 mg/dL (12 Feb 2020 11:37) Patient is a 79y old  Male who presents with a chief complaint of Chest Pain (12 Feb 2020 13:10)      INTERVAL HPI/OVERNIGHT EVENTS:  No acute event overnight. Continues to have mild pressure chest pain. Denies abdominal pain, n/v, SOB. No palpitations.   Tele: sinus 50-70.     MEDICATIONS  (STANDING):  amLODIPine   Tablet 5 milliGRAM(s) Oral daily  aspirin  chewable 81 milliGRAM(s) Oral daily  atorvastatin 80 milliGRAM(s) Oral at bedtime  carvedilol 25 milliGRAM(s) Oral every 12 hours  dextrose 5%. 1000 milliLiter(s) (50 mL/Hr) IV Continuous <Continuous>  dextrose 50% Injectable 12.5 Gram(s) IV Push once  dextrose 50% Injectable 25 Gram(s) IV Push once  dextrose 50% Injectable 25 Gram(s) IV Push once  heparin  Injectable 5000 Unit(s) SubCutaneous every 12 hours  hydrALAZINE 100 milliGRAM(s) Oral every 8 hours  insulin lispro (HumaLOG) corrective regimen sliding scale   SubCutaneous three times a day before meals  insulin lispro (HumaLOG) corrective regimen sliding scale   SubCutaneous at bedtime  isosorbide   mononitrate ER Tablet (IMDUR) 90 milliGRAM(s) Oral daily  levothyroxine 175 MICROGram(s) Oral daily  lisinopril 10 milliGRAM(s) Oral daily  pantoprazole    Tablet 40 milliGRAM(s) Oral before breakfast  ticagrelor 90 milliGRAM(s) Oral every 12 hours    MEDICATIONS  (PRN):  dextrose 40% Gel 15 Gram(s) Oral once PRN Blood Glucose LESS THAN 70 milliGRAM(s)/deciliter  glucagon  Injectable 1 milliGRAM(s) IntraMuscular once PRN Glucose LESS THAN 70 milligrams/deciliter  nitroglycerin     SubLingual 0.4 milliGRAM(s) SubLingual once PRN Chest Pain      Allergies    No Known Drug Allergies  shellfish (Vomiting)    Intolerances    REVIEW OF SYSTEMS:   CONSTITUTIONAL: No fever, chills, fatigue, recent weight change  HEENT: No vision changes, eye pain, vertigo, sore throat  RESPIRATORY: No cough, wheezing, shortness of breath  CARDIOVASCULAR: + chest pain. No palpitations, QUIROS, PND, orthopnea  GASTROINTESTINAL: No abdominal pain, nausea, vomiting, diarrhea, constipation, melena  GENITOURINARY: No hematuria, dysuria, nocturia, incontinence  MUSCULOSKELETAL: No swelling, joint pain, muscle weakness  NEUROLOGICAL: No focal weakness, paresthesias, headache, syncope  PSYCHOLOGICAL: mood and affect appropriate, no unusual anxiety    Vital Signs Last 24 Hrs  T(C): 37 (13 Feb 2020 08:25), Max: 37 (13 Feb 2020 08:25)  T(F): 98.6 (13 Feb 2020 08:25), Max: 98.6 (13 Feb 2020 08:25)  HR: 63 (13 Feb 2020 08:25) (52 - 69)  BP: 156/89 (13 Feb 2020 08:25) (122/68 - 174/75)  BP(mean): --  RR: 18 (13 Feb 2020 08:25) (18 - 18)  SpO2: 95% (13 Feb 2020 08:25) (95% - 97%)    PHYSICAL EXAM:  GENERAL: NAD. sitting on chair, out of bed.  HEENT: normocephalic, atraumatic, PERRLA, mucous membranes moist  NECK: without JVD  CHEST/LUNG: Clear to auscultation; No rales, rhonchi, or wheezing.  Respiratory effort does not appear labored.  HEART: Regular rate and rhythm; S1 and S2,  no murmurs, rubs, or gallops.  ABDOMEN: Soft, not tender to palpation.  No masses or HSM appreciated.  No distension.  Bowel sounds present.  EXTREMITIES:  No clubbing, cyanosis, or edema.  Moves all extremities with strength 5/5.  SKIN: No obvious rashes or lesions.  Turgor okay.  NEURO:  Alert and oriented x 3, no focal sensory or  motor deficit  PSYCH: alert and oriented.    LABS:                        10.8   6.17  )-----------( 145      ( 13 Feb 2020 06:51 )             34.2     02-13    138  |  103  |  58<H>  ----------------------------<  101<H>  4.2   |  20<L>  |  3.83<H>    Ca    9.3      13 Feb 2020 06:51  Mg     2.4     02-12    Trop: 39 -> 40 -> 40 -> 49 -> 51     CAPILLARY BLOOD GLUCOSE      POCT Blood Glucose.: 107 mg/dL (13 Feb 2020 07:34)  POCT Blood Glucose.: 114 mg/dL (12 Feb 2020 21:10)  POCT Blood Glucose.: 149 mg/dL (12 Feb 2020 16:33)  POCT Blood Glucose.: 119 mg/dL (12 Feb 2020 11:37)

## 2020-02-13 NOTE — PROGRESS NOTE ADULT - ASSESSMENT
78 year old Danish-speaking male with PMH ICM (EF 45-50%), CAD s/p PCI 2000 and CABG x2V, NSTEMI 4/2019 s/p PCI x2 to LAD and staged PCI to OM1 on 5/2019, CKD IV, HTN, DM2 presents for left substernal chest pain for 4 days. EKG without acute changes; negative high sensitivity troponin and negative delta. Prolonged chest pain at rest without EKG changes and negative troponin is not characteristic of angina. Please pursue other causes of chest pain - including GERD, esophageal spasm, esophagitis.     - c/w DAPT and atorvastatin 80   - c/w home coreg 25 BID and lisinopril 10mg QD  - seek non-cardiac explanations for symptoms.

## 2020-02-13 NOTE — PROGRESS NOTE ADULT - PROBLEM SELECTOR PLAN 8
1.  Name of PCP: Akira Wilson [facility] interventionalist  2.  PCP Contacted on Admission: [ ] Y    [ ] N    3.  PCP contacted at Discharge: [ ] Y    [ ] N    [ ] N/A  4.  Post-Discharge Appointment Date and Location:  5.  Summary of Handoff given to PCP:

## 2020-02-14 ENCOUNTER — TRANSCRIPTION ENCOUNTER (OUTPATIENT)
Age: 80
End: 2020-02-14

## 2020-02-14 VITALS
SYSTOLIC BLOOD PRESSURE: 132 MMHG | DIASTOLIC BLOOD PRESSURE: 74 MMHG | RESPIRATION RATE: 18 BRPM | OXYGEN SATURATION: 98 % | TEMPERATURE: 98 F | HEART RATE: 58 BPM

## 2020-02-14 DIAGNOSIS — N17.9 ACUTE KIDNEY FAILURE, UNSPECIFIED: ICD-10-CM

## 2020-02-14 LAB
ANION GAP SERPL CALC-SCNC: 14 MMOL/L — SIGNIFICANT CHANGE UP (ref 5–17)
BUN SERPL-MCNC: 59 MG/DL — HIGH (ref 7–23)
CALCIUM SERPL-MCNC: 9.3 MG/DL — SIGNIFICANT CHANGE UP (ref 8.4–10.5)
CHLORIDE SERPL-SCNC: 102 MMOL/L — SIGNIFICANT CHANGE UP (ref 96–108)
CO2 SERPL-SCNC: 22 MMOL/L — SIGNIFICANT CHANGE UP (ref 22–31)
CREAT SERPL-MCNC: 3.73 MG/DL — HIGH (ref 0.5–1.3)
GLUCOSE SERPL-MCNC: 96 MG/DL — SIGNIFICANT CHANGE UP (ref 70–99)
HCT VFR BLD CALC: 31.3 % — LOW (ref 39–50)
HGB BLD-MCNC: 10.2 G/DL — LOW (ref 13–17)
MCHC RBC-ENTMCNC: 31.9 PG — SIGNIFICANT CHANGE UP (ref 27–34)
MCHC RBC-ENTMCNC: 32.6 GM/DL — SIGNIFICANT CHANGE UP (ref 32–36)
MCV RBC AUTO: 97.8 FL — SIGNIFICANT CHANGE UP (ref 80–100)
NRBC # BLD: 0 /100 WBCS — SIGNIFICANT CHANGE UP (ref 0–0)
PHOSPHATE SERPL-MCNC: 3.6 MG/DL — SIGNIFICANT CHANGE UP (ref 2.5–4.5)
PLATELET # BLD AUTO: 142 K/UL — LOW (ref 150–400)
POTASSIUM SERPL-MCNC: 4.6 MMOL/L — SIGNIFICANT CHANGE UP (ref 3.5–5.3)
POTASSIUM SERPL-SCNC: 4.6 MMOL/L — SIGNIFICANT CHANGE UP (ref 3.5–5.3)
RBC # BLD: 3.2 M/UL — LOW (ref 4.2–5.8)
RBC # FLD: 13.2 % — SIGNIFICANT CHANGE UP (ref 10.3–14.5)
SODIUM SERPL-SCNC: 138 MMOL/L — SIGNIFICANT CHANGE UP (ref 135–145)
WBC # BLD: 6.58 K/UL — SIGNIFICANT CHANGE UP (ref 3.8–10.5)
WBC # FLD AUTO: 6.58 K/UL — SIGNIFICANT CHANGE UP (ref 3.8–10.5)

## 2020-02-14 PROCEDURE — 84156 ASSAY OF PROTEIN URINE: CPT

## 2020-02-14 PROCEDURE — 93005 ELECTROCARDIOGRAM TRACING: CPT

## 2020-02-14 PROCEDURE — 80053 COMPREHEN METABOLIC PANEL: CPT

## 2020-02-14 PROCEDURE — 83880 ASSAY OF NATRIURETIC PEPTIDE: CPT

## 2020-02-14 PROCEDURE — 84484 ASSAY OF TROPONIN QUANT: CPT

## 2020-02-14 PROCEDURE — 71046 X-RAY EXAM CHEST 2 VIEWS: CPT

## 2020-02-14 PROCEDURE — 85610 PROTHROMBIN TIME: CPT

## 2020-02-14 PROCEDURE — 71250 CT THORAX DX C-: CPT

## 2020-02-14 PROCEDURE — 82550 ASSAY OF CK (CPK): CPT

## 2020-02-14 PROCEDURE — 82570 ASSAY OF URINE CREATININE: CPT

## 2020-02-14 PROCEDURE — 85027 COMPLETE CBC AUTOMATED: CPT

## 2020-02-14 PROCEDURE — 83735 ASSAY OF MAGNESIUM: CPT

## 2020-02-14 PROCEDURE — 82962 GLUCOSE BLOOD TEST: CPT

## 2020-02-14 PROCEDURE — 85730 THROMBOPLASTIN TIME PARTIAL: CPT

## 2020-02-14 PROCEDURE — 99232 SBSQ HOSP IP/OBS MODERATE 35: CPT | Mod: GC

## 2020-02-14 PROCEDURE — 82553 CREATINE MB FRACTION: CPT

## 2020-02-14 PROCEDURE — 99239 HOSP IP/OBS DSCHRG MGMT >30: CPT

## 2020-02-14 PROCEDURE — 81001 URINALYSIS AUTO W/SCOPE: CPT

## 2020-02-14 PROCEDURE — 83036 HEMOGLOBIN GLYCOSYLATED A1C: CPT

## 2020-02-14 PROCEDURE — 99233 SBSQ HOSP IP/OBS HIGH 50: CPT | Mod: GC

## 2020-02-14 PROCEDURE — 99285 EMERGENCY DEPT VISIT HI MDM: CPT

## 2020-02-14 PROCEDURE — 84100 ASSAY OF PHOSPHORUS: CPT

## 2020-02-14 PROCEDURE — 80048 BASIC METABOLIC PNL TOTAL CA: CPT

## 2020-02-14 PROCEDURE — 93306 TTE W/DOPPLER COMPLETE: CPT

## 2020-02-14 RX ORDER — FAMOTIDINE 10 MG/ML
1 INJECTION INTRAVENOUS
Qty: 30 | Refills: 0
Start: 2020-02-14 | End: 2020-03-14

## 2020-02-14 RX ORDER — AMLODIPINE BESYLATE 2.5 MG/1
1 TABLET ORAL
Qty: 30 | Refills: 0
Start: 2020-02-14 | End: 2020-03-14

## 2020-02-14 RX ORDER — FUROSEMIDE 40 MG
1 TABLET ORAL
Qty: 0 | Refills: 0 | DISCHARGE

## 2020-02-14 RX ORDER — LISINOPRIL 2.5 MG/1
10 TABLET ORAL DAILY
Refills: 0 | Status: DISCONTINUED | OUTPATIENT
Start: 2020-02-14 | End: 2020-02-14

## 2020-02-14 RX ADMIN — Medication 175 MICROGRAM(S): at 05:13

## 2020-02-14 RX ADMIN — TICAGRELOR 90 MILLIGRAM(S): 90 TABLET ORAL at 05:13

## 2020-02-14 RX ADMIN — HEPARIN SODIUM 5000 UNIT(S): 5000 INJECTION INTRAVENOUS; SUBCUTANEOUS at 05:14

## 2020-02-14 RX ADMIN — Medication 81 MILLIGRAM(S): at 11:04

## 2020-02-14 RX ADMIN — FAMOTIDINE 20 MILLIGRAM(S): 10 INJECTION INTRAVENOUS at 11:04

## 2020-02-14 RX ADMIN — CARVEDILOL PHOSPHATE 25 MILLIGRAM(S): 80 CAPSULE, EXTENDED RELEASE ORAL at 17:06

## 2020-02-14 RX ADMIN — Medication 100 MILLIGRAM(S): at 13:27

## 2020-02-14 RX ADMIN — Medication 100 MILLIGRAM(S): at 05:13

## 2020-02-14 RX ADMIN — AMLODIPINE BESYLATE 5 MILLIGRAM(S): 2.5 TABLET ORAL at 05:13

## 2020-02-14 RX ADMIN — PANTOPRAZOLE SODIUM 40 MILLIGRAM(S): 20 TABLET, DELAYED RELEASE ORAL at 05:14

## 2020-02-14 RX ADMIN — TICAGRELOR 90 MILLIGRAM(S): 90 TABLET ORAL at 17:06

## 2020-02-14 RX ADMIN — CARVEDILOL PHOSPHATE 25 MILLIGRAM(S): 80 CAPSULE, EXTENDED RELEASE ORAL at 05:14

## 2020-02-14 RX ADMIN — ISOSORBIDE MONONITRATE 90 MILLIGRAM(S): 60 TABLET, EXTENDED RELEASE ORAL at 11:04

## 2020-02-14 NOTE — DISCHARGE NOTE NURSING/CASE MANAGEMENT/SOCIAL WORK - PATIENT PORTAL LINK FT
You can access the FollowMyHealth Patient Portal offered by [facility] by registering at the following website: [email or web address]. By joining [facility]’s FollowMyHealth portal, you will also be able to view your health information using other applications (apps) compatible with our system.

## 2020-02-14 NOTE — PROGRESS NOTE ADULT - PROBLEM SELECTOR PLAN 1
Most likely to GERD as belching and chest pain resolved with pepcid and PPI  -c/w pepcid daily prn on discharge, d/c PPI daily given degree of CKD  -EKG with minimal TW variation and trops delta minimal in setting of CKD  -TTE with EF 50% and unchanged from previous  -per cardio, no plan for cath and R>B given CKD and rec workup noncardiac causes of chest pain  -CT noncontrast with no sign of aortic dissection or other intrathoracic findings, clinically also not concerned for dissection  -outpatient PCP and cards followup with Dr Akira Wilson Most likely to GERD as belching and chest pain resolved with pepcid and PPI  -c/w pepcid daily prn on discharge, c/w home ppi daily  -EKG with minimal TW variation and trops delta minimal in setting of CKD  -TTE with EF 50% and unchanged from previous  -per cardio, no plan for cath and R>B given CKD and rec workup noncardiac causes of chest pain  -CT noncontrast with no sign of aortic dissection or other intrathoracic findings, clinically also not concerned for dissection  -outpatient PCP and cards followup with Dr Akira Wilson

## 2020-02-14 NOTE — PROGRESS NOTE ADULT - PROBLEM SELECTOR PLAN 4
bp improved  -c/w coreg 25mg bid, imdur 90mg daily, hydralazine 100mg  TID, per renal ok to c/w home lisinpril 10mg daily  -c/w amlodipine 5mg daily  -monitor pb

## 2020-02-14 NOTE — DISCHARGE NOTE PROVIDER - NSDCMRMEDTOKEN_GEN_ALL_CORE_FT
amLODIPine 5 mg oral tablet: 1 tab(s) orally once a day  aspirin 81 mg oral delayed release tablet: 1 tab(s) orally once a day  atorvastatin 10 mg oral tablet: 1 tab(s) orally once a day  carvedilol 12.5 mg oral tablet: 1 tab(s) orally every 12 hours  famotidine 20 mg oral tablet: 1 tab(s) orally once a day, As Needed -for heartburn - for indigestion, belching  hydrALAZINE 100 mg oral tablet: 1 tab(s) orally every 8 hours   isosorbide mononitrate 30 mg oral tablet, extended release: 3 tab(s) orally once a day  latanoprost 0.005% ophthalmic solution: 1 drop(s) to each affected eye once a day (at bedtime)  levothyroxine 175 mcg (0.175 mg) oral tablet: 1 tab(s) orally once a day  lisinopril 10 mg oral tablet: 1 tab(s) orally once a day  omeprazole 20 mg oral delayed release capsule: 1 cap(s) orally once a day  Simbrinza 1%- 0.2% ophthalmic suspension: 1 drop(s) to each affected eye 2 times a day  ticagrelor 90 mg oral tablet: 1 tab(s) orally 2 times a day  Tradjenta 5 mg oral tablet: 1 tab(s) orally once a day

## 2020-02-14 NOTE — DISCHARGE NOTE PROVIDER - NSDCCAREPROVSEEN_GEN_ALL_CORE_FT
Maria De Jesus Rodríguez  [facility] Cardiology, Consults Teaching Sv  [facility], Team  [facility] Medicine, Advance PracticeTeam  [facility] Cardio-Renal, Team

## 2020-02-14 NOTE — DISCHARGE NOTE PROVIDER - NSFOLLOWUPCLINICS_GEN_ALL_ED_FT
[facility]  Cardiology  [address] [place] [id number]  Phone: [phone]  Fax:   Follow Up Time: 1 week

## 2020-02-14 NOTE — DISCHARGE NOTE PROVIDER - HOSPITAL COURSE
79M Zambian speaking w/ hx of CAD s/p 2DES May 2019, DM2, HTN, HLD, CHF, Hypothyroid p/w chest pain.          Problem/Plan - 1:    ·  Problem: Chest pain.  Plan: Most likely to GERD as belching and chest pain resolved with pepcid and PPI    -c/w pepcid daily prn on discharge, c/w ppi daily    -EKG with minimal TW variation and trops delta minimal in setting of CKD    -TTE with EF 50% and unchanged from previous    -per cardio, no plan for cath and R>B given CKD and rec workup noncardiac causes of chest pain    -CT noncontrast with no sign of aortic dissection or other intrathoracic findings, clinically also not concerned for dissection    -outpatient PCP and cards followup with Dr Akira Wilson.          Problem/Plan - 2:    ·  Problem: Acute renal failure superimposed on chronic kidney disease, unspecified CKD stage, unspecified acute renal failure type.  Plan: Lorena on CKD stage 4    -CR improving 3.7 today, likely from overdiuresis with lasix, baseline 3.0-3.5    -bladder scan negative,     -renal consulted appreciated, c/w home lisinopril, UA and protein/creatinine ratio stable, no further lasix, outpatient f/u with Dr Zachary Fisher.          Problem/Plan - 3:    ·  Problem: Coronary artery disease involving native coronary artery of native heart with unstable angina pectoris.  Plan: CAD with stents 2019    -Cont. asa and brilinta bid    -Cont. atorvastatin, toprol.          Problem/Plan - 4:    ·  Problem: Essential hypertension.  Plan: bp improved    -c/w coreg 25mg bid, imdur 90mg daily, hydralazine 100mg  TID, per renal ok to c/w home lisinpril 10mg daily    -c/w amlodipine 5mg daily    -monitor pb.          Problem/Plan - 5:    ·  Problem: Systolic congestive heart failure, unspecified HF chronicity.  Plan: EF ~50%, euvolemic    -Cont. coreg 25mg bid, imdur 90mg daily, hydralazine 100mg  TID    -c/w lisinopril 10mg daily,          Problem/Plan - 6:    Problem: Type 2 diabetes mellitus with other specified complication, without long-term current use of insulin. Plan: FS stable    -c/w JEFFREY    -monitor fs.         Problem/Plan - 7:    ·  Problem: Hypothyroidism, unspecified type.  Plan: c/w levothyroxine daily.          Problem/Plan - 8:    ·  Problem: Discharge planning issues.  Plan: 1.  Name of PCP: Akira Wilson [facility] interventionalist    2.  PCP Contacted on Admission: [ ] Y    [ ] xN      3.  PCP contacted at Discharge: [ x] Y    [ ] N    [ ] N/A    4.  Post-Discharge Appointment Date and Location:    5.  Summary of Handoff given to PCP: email.          Problem/Plan - 9:    ·  Problem: Prophylactic measure.  Plan: DVT PPx: hsq     Dispo: discharge home today with cards f/u Dr Akira Wilson and renal Dr Natalia Sharma    spent 45 min on discharge process time.

## 2020-02-14 NOTE — PROGRESS NOTE ADULT - ATTENDING COMMENTS
78 year old man presents with 4 days of chest pain, pain at times more severe, but otherwise continuous. Exam unremarkable except soft systolic murmur base and faint diastolic blow. Troponin repeated results all below 99% URL despite elevated creatinine and EKG not indicative of ischemia. Symptoms do not seem related to a cardiac syndrome, seek non-cardiac explanations. Coronary angiography not indicated and risk of potentially leading to dialysis greatly exceeds potential benefit. Now asymptomatic  on PPI and H2-blocker. Please call if we can be of further assistance.
78 year old man presents with 4 days of chest pain, pain at times more severe, but otherwise continuous. Exam unremarkable except soft systolic murmur base and faint diastolic blow. Troponin repeated results all below 99% URL despite elevated creatinine and EKG not indicative of ischemia. Symptoms do not seem related to a cardiac syndrome, seek non-cardiac explanations. Coronary angiography not indicated and risk of potentially leading to dialysis greatly exceeds potential benefit.

## 2020-02-14 NOTE — PROGRESS NOTE ADULT - ASSESSMENT
78 year old Maltese-speaking male with PMH ICM (EF 45-50%), CAD s/p PCI 2000 and CABG x2V, NSTEMI 4/2019 s/p PCI x2 to LAD and staged PCI to OM1 on 5/2019, CKD IV, HTN, DM2 presents for left substernal chest pain for 4 days. EKG without acute changes; negative high sensitivity troponin and negative delta. Prolonged chest pain at rest without EKG changes and negative troponin is not characteristic of angina. Chest pain now resolved. Possibly due to GERD given improvement after starting pepcid.    #chest pain  - chest pain resolved since starting pepcid   - c/w DAPT and atorvastatin 80   - c/w home coreg 25 BID and lisinopril 10mg QD

## 2020-02-14 NOTE — PROGRESS NOTE ADULT - NSHPATTENDINGPLANDISCUSS_GEN_ALL_CORE
Call Cardiology Fellow or Attending as listed on [email or web address] password: [email or web address].
Call Cardiology Fellow or Attending as listed on [email or web address] password: [email or web address].
TC Aguirre
TC Pleitez
cardio, TC Aguirre

## 2020-02-14 NOTE — DISCHARGE NOTE PROVIDER - NSDCCPCAREPLAN_GEN_ALL_CORE_FT
PRINCIPAL DISCHARGE DIAGNOSIS  Diagnosis: Chest pain  Assessment and Plan of Treatment: likely related to GERD and acid reflex  you were evalauted by cardiology and they recommended ultrasound of your heart which was unchanged, no other interventions  return to ER if any concering symptoms, recurrent chest pain, shortness of breathing, palpitations, dizziness, passing out, nausea, vomiting etc  followup with your caridologist Dr Akira Wilson within 1 week      SECONDARY DISCHARGE DIAGNOSES  Diagnosis: Acute kidney injury superimposed on chronic kidney disease  Assessment and Plan of Treatment: your kidney had some injury but imrpoved by discharged  followup with your kidney doctor next week to recheck your blood work. Dr Zachary Fisher    Diagnosis: Systolic congestive heart failure, unspecified HF chronicity  Assessment and Plan of Treatment: contine medicatinos as prescribed  followup with your cardiologist    Diagnosis: Type 2 diabetes mellitus with other specified complication, without long-term current use of insulin  Assessment and Plan of Treatment: continue home medications    Diagnosis: Essential hypertension  Assessment and Plan of Treatment: we added amlodipine to your blood pressure medications  followup as outpatient

## 2020-02-14 NOTE — DISCHARGE NOTE PROVIDER - CARE PROVIDER_API CALL
Natalia Sharma)  Internal Medicine; Nephrology  [address] [place] [address], [address] [place] [address]  Phone: [phone]  Fax: [phone]  Established Patient  Follow Up Time: 1 week

## 2020-02-14 NOTE — PROGRESS NOTE ADULT - PROBLEM SELECTOR PLAN 8
1.  Name of PCP: Akira Wilson CardiologyMust[facility]ha interventionalist  2.  PCP Contacted on Admission: [ ] Y    [ ] xN    3.  PCP contacted at Discharge: [ x] Y    [ ] N    [ ] N/A  4.  Post-Discharge Appointment Date and Location:  5.  Summary of Handoff given to PCP: email

## 2020-02-14 NOTE — PROGRESS NOTE ADULT - SUBJECTIVE AND OBJECTIVE BOX
INTERVAL HPI/OVERNIGHT EVENTS:  No acute overnight event. Started on pepcid yesterday. Seen at bedside, sitting on chair out of bed. Reports no chest pain today; no palpitation, SOB, abdominal pain, n/v.       MEDICATIONS  (STANDING):  amLODIPine   Tablet 5 milliGRAM(s) Oral daily  aspirin  chewable 81 milliGRAM(s) Oral daily  atorvastatin 80 milliGRAM(s) Oral at bedtime  carvedilol 25 milliGRAM(s) Oral every 12 hours  dextrose 5%. 1000 milliLiter(s) (50 mL/Hr) IV Continuous <Continuous>  dextrose 50% Injectable 12.5 Gram(s) IV Push once  dextrose 50% Injectable 25 Gram(s) IV Push once  dextrose 50% Injectable 25 Gram(s) IV Push once  famotidine    Tablet 20 milliGRAM(s) Oral daily  heparin  Injectable 5000 Unit(s) SubCutaneous every 12 hours  hydrALAZINE 100 milliGRAM(s) Oral every 8 hours  insulin lispro (HumaLOG) corrective regimen sliding scale   SubCutaneous three times a day before meals  insulin lispro (HumaLOG) corrective regimen sliding scale   SubCutaneous at bedtime  isosorbide   mononitrate ER Tablet (IMDUR) 90 milliGRAM(s) Oral daily  levothyroxine 175 MICROGram(s) Oral daily  pantoprazole    Tablet 40 milliGRAM(s) Oral before breakfast  ticagrelor 90 milliGRAM(s) Oral every 12 hours    MEDICATIONS  (PRN):  dextrose 40% Gel 15 Gram(s) Oral once PRN Blood Glucose LESS THAN 70 milliGRAM(s)/deciliter  glucagon  Injectable 1 milliGRAM(s) IntraMuscular once PRN Glucose LESS THAN 70 milligrams/deciliter  nitroglycerin     SubLingual 0.4 milliGRAM(s) SubLingual once PRN Chest Pain      Allergies    No Known Drug Allergies  shellfish (Vomiting)    Intolerances    REVIEW OF SYSTEMS:   CONSTITUTIONAL: No fever, chills, fatigue, recent weight change  HEENT: No vision changes, eye pain, vertigo, sore throat  RESPIRATORY: No cough, wheezing, shortness of breath  CARDIOVASCULAR: No chest pain, palpitations, QUIROS, PND, orthopnea  GASTROINTESTINAL: No abdominal pain, nausea, vomiting, diarrhea, constipation, melena  GENITOURINARY: No hematuria, dysuria, nocturia, incontinence  MUSCULOSKELETAL: No swelling, joint pain, muscle weakness  NEUROLOGICAL: No focal weakness, paresthesias, headache, syncope  PSYCHOLOGICAL: mood and affect appropriate      Vital Signs Last 24 Hrs  T(C): 37.3 (14 Feb 2020 08:36), Max: 37.3 (14 Feb 2020 08:36)  T(F): 99.1 (14 Feb 2020 08:36), Max: 99.1 (14 Feb 2020 08:36)  HR: 60 (14 Feb 2020 08:36) (53 - 64)  BP: 149/71 (14 Feb 2020 08:36) (118/80 - 159/79)  BP(mean): --  RR: 18 (14 Feb 2020 08:36) (18 - 18)  SpO2: 98% (14 Feb 2020 08:36) (98% - 99%)    PHYSICAL EXAM:  GENERAL: NAD. sitting on chair, out of bed.   HEENT: PERRL, EOMI, MMM  CHEST/LUNG: Clear to auscultation; No rales, rhonchi, or wheezing.  Respiratory effort does not appear labored.  HEART: Regular rate and rhythm; S1 and S2,  no murmurs, rubs, or gallops.  ABDOMEN: Soft, not tender to palpation.  No masses or HSM appreciated.  No distension.  Bowel sounds present.  EXTREMITIES:  No clubbing, cyanosis, or edema.  Moves all extremities  SKIN: No obvious rashes or lesions.  Turgor okay.  NEURO:  Alert and oriented x 3, no focal sensory or  motor deficit    LABS:                        10.2   6.58  )-----------( 142      ( 14 Feb 2020 06:08 )             31.3     02-14    138  |  102  |  59<H>  ----------------------------<  96  4.6   |  22  |  3.73<H>    Ca    9.3      14 Feb 2020 06:08  Phos  3.6     02-14

## 2020-02-14 NOTE — PROGRESS NOTE ADULT - PROBLEM SELECTOR PLAN 5
EF ~50%, euvolemic  -Cont. coreg 25mg bid, imdur 90mg daily, hydralazine 100mg  TID  -c/w lisinopril 10mg daily,

## 2020-02-14 NOTE — PROGRESS NOTE ADULT - PROBLEM SELECTOR PLAN 2
Lorena on CKD stage 4  -CR improving 3.7 today, likely from overdiuresis with lasix, baseline 3.0-3.5  -bladder scan negative,   -renal consulted appreciated, c/w home lisinopril, UA and protein/creatinine ratio stable, no further lasix, outpatient f/u with Dr Zachary Fisher

## 2020-02-14 NOTE — PROGRESS NOTE ADULT - PROBLEM SELECTOR PLAN 9
DVT PPx: hsq   Dispo: discharge home today with cards f/u Dr Akira Wilson and renal Dr Natalia Sharma  spent 45 min on discharge process time

## 2020-02-14 NOTE — PROGRESS NOTE ADULT - SUBJECTIVE AND OBJECTIVE BOX
Patient is a 79y old  Male who presents with a chief complaint of Chest Pain (2020 09:24)      SUBJECTIVE / OVERNIGHT EVENTS: No overnight events. Reports chest pain has resolved since taking pepcid yesterday, no further belching either. Denies sob, n/v, dizziness. Wants to go home    Tele reviewed: sinus 50-70, pvcs      ADDITIONAL REVIEW OF SYSTEMS: Negative except for above    MEDICATIONS  (STANDING):  amLODIPine   Tablet 5 milliGRAM(s) Oral daily  aspirin  chewable 81 milliGRAM(s) Oral daily  atorvastatin 80 milliGRAM(s) Oral at bedtime  carvedilol 25 milliGRAM(s) Oral every 12 hours  dextrose 5%. 1000 milliLiter(s) (50 mL/Hr) IV Continuous <Continuous>  dextrose 50% Injectable 12.5 Gram(s) IV Push once  dextrose 50% Injectable 25 Gram(s) IV Push once  dextrose 50% Injectable 25 Gram(s) IV Push once  famotidine    Tablet 20 milliGRAM(s) Oral daily  heparin  Injectable 5000 Unit(s) SubCutaneous every 12 hours  hydrALAZINE 100 milliGRAM(s) Oral every 8 hours  insulin lispro (HumaLOG) corrective regimen sliding scale   SubCutaneous three times a day before meals  insulin lispro (HumaLOG) corrective regimen sliding scale   SubCutaneous at bedtime  isosorbide   mononitrate ER Tablet (IMDUR) 90 milliGRAM(s) Oral daily  levothyroxine 175 MICROGram(s) Oral daily  lisinopril 10 milliGRAM(s) Oral daily  pantoprazole    Tablet 40 milliGRAM(s) Oral before breakfast  ticagrelor 90 milliGRAM(s) Oral every 12 hours    MEDICATIONS  (PRN):  dextrose 40% Gel 15 Gram(s) Oral once PRN Blood Glucose LESS THAN 70 milliGRAM(s)/deciliter  glucagon  Injectable 1 milliGRAM(s) IntraMuscular once PRN Glucose LESS THAN 70 milligrams/deciliter  nitroglycerin     SubLingual 0.4 milliGRAM(s) SubLingual once PRN Chest Pain      CAPILLARY BLOOD GLUCOSE      POCT Blood Glucose.: 116 mg/dL (2020 11:30)  POCT Blood Glucose.: 91 mg/dL (2020 07:41)  POCT Blood Glucose.: 132 mg/dL (2020 21:15)  POCT Blood Glucose.: 131 mg/dL (2020 16:45)    I&O's Summary    2020 07:01  -  2020 07:00  --------------------------------------------------------  IN: 560 mL / OUT: 800 mL / NET: -240 mL    2020 07:01  -  2020 11:41  --------------------------------------------------------  IN: 320 mL / OUT: 300 mL / NET: 20 mL        PHYSICAL EXAM:  Vital Signs Last 24 Hrs  T(C): 36.9 (2020 11:01), Max: 37.3 (2020 08:36)  T(F): 98.4 (2020 11:01), Max: 99.1 (2020 08:36)  HR: 58 (2020 11:01) (53 - 64)  BP: 149/70 (2020 11:01) (118/80 - 159/79)  BP(mean): --  RR: 18 (2020 08:36) (18 - 18)  SpO2: 98% (2020 08:36) (98% - 99%)    SpO2: 98% (2020 12:00) (95% - 98%)    PHYSICAL EXAM:  GENERAL: NAD, well-developed  HEAD:  Atraumatic, Normocephalic  NECK: Supple, No JVD  CHEST/LUNG: Clear to auscultation bilaterally; No wheeze. nontender chest  HEART: Regular rate and rhythm;   ABDOMEN: Soft, Nontender, Nondistended; Bowel sounds present  EXTREMITIES:  2+ Peripheral Pulses, No clubbing, cyanosis, or edema  PSYCH: AAOx3  NEUROLOGY: non-focal    LABS:                        10.2   6.58  )-----------( 142      ( 2020 06:08 )             31.3     02-14    138  |  102  |  59<H>  ----------------------------<  96  4.6   |  22  |  3.73<H>    Ca    9.3      2020 06:08  Phos  3.6     02-14        CARDIAC MARKERS ( 2020 13:58 )  x     / x     / 92 U/L / x     / 2.6 ng/mL      Urinalysis Basic - ( 2020 16:49 )    Color: Colorless / Appearance: Clear / S.006 / pH: x  Gluc: x / Ketone: Negative  / Bili: Negative / Urobili: Negative   Blood: x / Protein: 30 mg/dL / Nitrite: Negative   Leuk Esterase: Negative / RBC: 0 /hpf / WBC 0 /HPF   Sq Epi: x / Non Sq Epi: 0 /hpf / Bacteria: Negative          RADIOLOGY & ADDITIONAL TESTS:    Imaging Personally Reviewed: CT chest: IMPRESSION:     Bibasilar subsegmental atelectasis.    Please note that evaluation for dissection cannot be obtained on a noncontrast study. However, no indirect signs for aortic dissection. No intramural hematoma.    Electrocardiogram Personally Reviewed:    COORDINATION OF CARE:  Care Discussed with Consultants/Other Providers [Y/N]:  Prior or Outpatient Records Reviewed [Y/N]:

## 2020-02-14 NOTE — PROGRESS NOTE ADULT - SUBJECTIVE AND OBJECTIVE BOX
[facility] DIVISION OF KIDNEY DISEASES AND HYPERTENSION -- FOLLOW UP NOTE  --------------------------------------------------------------------------------  HPI: 77 yo M with CKD IV (thought to be 2/2 diabetic nephropathy), ICM (EF 45-50%), CAD s/p PCI 2000 and CABG x2V, NSTEMI 4/2019 s/p staged PCI, HTN, DM2 admitted for chest pain. Baseline Scr ~ 3-3.5. Scr on admission at baseline, however worsened yesterday to 3.8 in the setting of lower BP and diuretic use. Scr this AM is improved slightly to 3.73. Nephrology team is following for ALDEN on CKD.    Pt. seen and examined at bedside this AM. Pt. states that he feels well and has no complaints. Denies CP, SOB, N/V/F/C.    PAST HISTORY  --------------------------------------------------------------------------------  No significant changes to PMH, PSH, FHx, SHx, unless otherwise noted    ALLERGIES & MEDICATIONS  --------------------------------------------------------------------------------  Allergies    No Known Drug Allergies  shellfish (Vomiting)    Intolerances    Standing Inpatient Medications  amLODIPine   Tablet 5 milliGRAM(s) Oral daily  aspirin  chewable 81 milliGRAM(s) Oral daily  atorvastatin 80 milliGRAM(s) Oral at bedtime  carvedilol 25 milliGRAM(s) Oral every 12 hours  dextrose 5%. 1000 milliLiter(s) IV Continuous <Continuous>  dextrose 50% Injectable 12.5 Gram(s) IV Push once  dextrose 50% Injectable 25 Gram(s) IV Push once  dextrose 50% Injectable 25 Gram(s) IV Push once  famotidine    Tablet 20 milliGRAM(s) Oral daily  heparin  Injectable 5000 Unit(s) SubCutaneous every 12 hours  hydrALAZINE 100 milliGRAM(s) Oral every 8 hours  insulin lispro (HumaLOG) corrective regimen sliding scale   SubCutaneous three times a day before meals  insulin lispro (HumaLOG) corrective regimen sliding scale   SubCutaneous at bedtime  isosorbide   mononitrate ER Tablet (IMDUR) 90 milliGRAM(s) Oral daily  levothyroxine 175 MICROGram(s) Oral daily  pantoprazole    Tablet 40 milliGRAM(s) Oral before breakfast  ticagrelor 90 milliGRAM(s) Oral every 12 hours    REVIEW OF SYSTEMS  --------------------------------------------------------------------------------  Gen: No lethargy  Respiratory: No dyspnea  CV: No chest pain  GI: No abdominal pain  MSK: No LE edema  Neuro: No dizziness  Heme: No bleeding    All other systems were reviewed and are negative, except as noted.    VITALS/PHYSICAL EXAM  --------------------------------------------------------------------------------  T(C): 37 (02-14-20 @ 04:11), Max: 37 (02-14-20 @ 04:11)  HR: 63 (02-14-20 @ 05:12) (53 - 64)  BP: 131/73 (02-14-20 @ 04:11) (118/80 - 159/79)  RR: 18 (02-14-20 @ 04:11) (18 - 18)  SpO2: 99% (02-14-20 @ 04:11) (98% - 99%)  Wt(kg): --    02-13-20 @ 07:01  -  02-14-20 @ 07:00  --------------------------------------------------------  IN: 560 mL / OUT: 800 mL / NET: -240 mL    02-14-20 @ 07:01  -  02-14-20 @ 08:31  --------------------------------------------------------  IN: 0 mL / OUT: 300 mL / NET: -300 mL    Physical Exam:  	Gen: NAD  	HEENT: Supple neck  	Pulm: CTA B/L  	CV: RRR, S1S2; no rub  	Abd: +BS, soft, nontender/nondistended  	: No suprapubic tenderness  	LE: No edema b/l    LABS/STUDIES  --------------------------------------------------------------------------------              10.2   6.58  >-----------<  142      [02-14-20 @ 06:08]              31.3     138  |  102  |  59  ----------------------------<  96      [02-14-20 @ 06:08]  4.6   |  22  |  3.73        Ca     9.3     [02-14-20 @ 06:08]      Phos  3.6     [02-14-20 @ 06:08]    Creatinine Trend:  SCr 3.73 [02-14 @ 06:08]  SCr 3.83 [02-13 @ 06:51]  SCr 3.56 [02-12 @ 06:33]  SCr 3.52 [02-11 @ 20:07]    Urinalysis - [02-13-20 @ 16:49]      Color Colorless / Appearance Clear / SG 1.006 / pH 6.0      Gluc Negative / Ketone Negative  / Bili Negative / Urobili Negative       Blood Negative / Protein 30 mg/dL / Leuk Est Negative / Nitrite Negative      RBC 0 / WBC 0 / Hyaline  / Gran  / Sq Epi  / Non Sq Epi 0 / Bacteria Negative    Urine Creatinine 30      [02-13-20 @ 20:25]  Urine Protein 34      [02-13-20 @ 20:25]    HbA1c 5.7      [02-12-20 @ 08:41]

## 2020-03-03 NOTE — PHYSICAL EXAM
[General Appearance - Well-Appearing] : healthy appearing [General Appearance - In No Acute Distress] : in no acute distress [General Appearance - Alert] : alert [General Appearance - Well Developed] : well developed [Sclera] : the sclera and conjunctiva were normal [PERRL With Normal Accommodation] : pupils were equal in size, round, and reactive to light [Hearing Threshold Finger Rub Not Wake] : hearing was normal [Neck Appearance] : the appearance of the neck was normal [Outer Ear] : the ears and nose were normal in appearance [Examination Of The Oral Cavity] : the lips and gums were normal [Neck Cervical Mass (___cm)] : no neck mass was observed [Jugular Venous Distention Increased] : there was no jugular-venous distention [Heart Rate And Rhythm] : heart rate was normal and rhythm regular [Heart Sounds] : normal S1 and S2 [Heart Sounds Gallop] : no gallops [Heart Sounds Pericardial Friction Rub] : no pericardial rub [Murmurs] : no murmurs [Abdomen Tenderness] : non-tender [Bowel Sounds] : normal bowel sounds [No CVA Tenderness] : no ~M costovertebral angle tenderness [Abdomen Soft] : soft [No Spinal Tenderness] : no spinal tenderness [Nail Clubbing] : no clubbing  or cyanosis of the fingernails [Abnormal Walk] : normal gait [Skin Color & Pigmentation] : normal skin color and pigmentation [Musculoskeletal - Swelling] : no joint swelling seen [Skin Turgor] : normal skin turgor [Motor Tone] : muscle strength and tone were normal [Skin Lesions] : no skin lesions [Cranial Nerves] : cranial nerves 2-12 were intact [Deep Tendon Reflexes (DTR)] : deep tendon reflexes were 2+ and symmetric [] : no rash [Oriented To Time, Place, And Person] : oriented to person, place, and time [Sensation] : the sensory exam was normal to light touch and pinprick [Motor Exam] : the motor exam was normal [Impaired Insight] : insight and judgment were intact [Mood] : the mood was normal [Affect] : the affect was normal

## 2020-03-03 NOTE — HISTORY OF PRESENT ILLNESS
[FreeTextEntry1] : 77 yo French speaking male with PMH HTN, DMT2, CAD with stents x2 (2000), hypothyroid, depression, transferred from [facility] for NSTEMI and hypertensive urgency s/p CCU stay requiring on nitro gtt s/p PCI with MACI x 2 to pLAD 4/1. Baseline creat in sep 2018 was 2.0. admitted with a creat of 3.0 likely in the setting of malignant hypertension which got worse due to RAMIRO. Discharged with a creat of 3.4 on 4/8/2019 and has beeni in the 3s range. He had a \par \par Since his last visit, he had a cardiac cath > [facility] 5/6/2019 . MACI to OM1 \par \par C 5/6/2019\par \par RA 6\par RV 39/12\par PA 38/14/21\par PCWP 9 \par PA 63\par CO 6.36/CI 2.75 \par SVR 1182 \par \par started on lisinopril 5 a day recently\par \par per his daughter, he feels well. He is eating well and denies any nausea/ vomiting. \par \par ROS \par  - No changes in urination, no blood in urine \par CVS- No chest pain, no shortness of breath \par all other systems reviewed in detail and were negative except as above \par \par

## 2020-03-03 NOTE — ASSESSMENT
[FreeTextEntry1] : 77 yo male with ckd 4 likely sec to diabetic nephropathy, HTN, CAD with stents x2 (2000), hypothyroid, depression, transferred from [facility] for NSTEMI and hypertensive urgency s/p CCU stay requiring on nitro gtt s/p PCI with MACI x 2 to pLAD 4/1 with ALDEN \par \par CKD 4- Likely sec to diabetic nephropathy. serological work up negative. ALDEN likely sec to malignant hypertension and worsened by RAMIRO during cardiac cath. No dialysis needs at this time. I have discussed about dialysis with him and his daughter and she states that he would not want dialysis. also they were not interested in pursuing access planning.  \par \par anemia- Check CBC. He has not needed EPO. \par \par htn- well controlled. \par

## 2020-04-28 ENCOUNTER — APPOINTMENT (OUTPATIENT)
Dept: NEPHROLOGY | Facility: CLINIC | Age: 80
End: 2020-04-28
Payer: MEDICARE

## 2020-04-28 PROCEDURE — 99442: CPT | Mod: CR

## 2020-06-10 ENCOUNTER — APPOINTMENT (OUTPATIENT)
Dept: CARDIOLOGY | Facility: HOSPITAL | Age: 80
End: 2020-06-10

## 2020-06-19 LAB
ANION GAP SERPL CALC-SCNC: 17 MMOL/L
BUN SERPL-MCNC: 44 MG/DL
CALCIUM SERPL-MCNC: 9.5 MG/DL
CHLORIDE SERPL-SCNC: 102 MMOL/L
CHOLEST SERPL-MCNC: 131 MG/DL
CHOLEST/HDLC SERPL: 3.2 RATIO
CO2 SERPL-SCNC: 22 MMOL/L
CREAT SERPL-MCNC: 3.34 MG/DL
ESTIMATED AVERAGE GLUCOSE: 111 MG/DL
GLUCOSE SERPL-MCNC: 101 MG/DL
HBA1C MFR BLD HPLC: 5.5 %
HDLC SERPL-MCNC: 40 MG/DL
LDLC SERPL CALC-MCNC: 57 MG/DL
POTASSIUM SERPL-SCNC: 5 MMOL/L
SODIUM SERPL-SCNC: 140 MMOL/L
TRIGL SERPL-MCNC: 166 MG/DL

## 2020-06-30 ENCOUNTER — OUTPATIENT (OUTPATIENT)
Dept: OUTPATIENT SERVICES | Facility: HOSPITAL | Age: 80
LOS: 1 days | End: 2020-06-30
Payer: MEDICARE

## 2020-06-30 ENCOUNTER — APPOINTMENT (OUTPATIENT)
Dept: CARDIOLOGY | Facility: HOSPITAL | Age: 80
End: 2020-06-30

## 2020-06-30 ENCOUNTER — NON-APPOINTMENT (OUTPATIENT)
Age: 80
End: 2020-06-30

## 2020-06-30 VITALS
WEIGHT: 237 LBS | BODY MASS INDEX: 35.92 KG/M2 | DIASTOLIC BLOOD PRESSURE: 71 MMHG | HEART RATE: 56 BPM | SYSTOLIC BLOOD PRESSURE: 123 MMHG | OXYGEN SATURATION: 98 % | HEIGHT: 68 IN

## 2020-06-30 DIAGNOSIS — Z98.890 OTHER SPECIFIED POSTPROCEDURAL STATES: Chronic | ICD-10-CM

## 2020-06-30 DIAGNOSIS — I25.10 ATHEROSCLEROTIC HEART DISEASE OF NATIVE CORONARY ARTERY WITHOUT ANGINA PECTORIS: ICD-10-CM

## 2020-06-30 PROCEDURE — G0463: CPT

## 2020-06-30 PROCEDURE — 93005 ELECTROCARDIOGRAM TRACING: CPT

## 2020-06-30 NOTE — HISTORY OF PRESENT ILLNESS
[FreeTextEntry1] : Mr Resendez is a 80 yo M with a PMH significant for CAD (s/p PCI 2000, NSTEMI 4/2019 s/p PCI x 2 to LAD and staged PCI to OM1 in 5/2019), ICM (EF 45-50%, normal RV), CKD stage IV (B/L Scr 3-4.2), DM2 (A1c 6.3 on 4/2019), HTN, who presents for follow up. Daughter at bedside.\par \par Patient has been feeling relatively well and has been walking for an hour daily. When walking however, he notes that he has to stop because he feels fatigued after about half a block. This is unchanged over the past year. He denies SOB, chest pain, palpitations, LE edema. Of note, he was admitted in Feb 2020 with chest pain and was ruled out for ACS. At the time, symptoms improved with PPIs. He has since felt well. Also of note, he complains of intermittent, scattered ecchymoses on his arms which are not painful and are not associated with trauma.\par \par \par Current medications:\par ASA 81mg daily \par Brilinta 90 mg daily\par [id number] daily\par carvedilol 12.5mg BID\par tradjenta 5mg daily\par amlodipine 5mg daily\par lisinopril 10mg daily\par hydralazine 100mg TID\par Imdur 30mg daily\par levothyroxine 175mg\par \par \par Cardiac Hx \par \par LHC 5/6/2019 . MACI to OM1 \par \par RHC 5/6/2019\par \par RA 6\par RV 39/12\par PA 38/14/21\par PCWP 9 \par PA 63\par CO 6.36/CI 2.75 \par SVR 1182 \par \par TTE 4/2/2019 \par  Conclusions:\par 1. Mitral valve not well visualized, probably normal.\par Minimal mitral regurgitation.\par 2. Normal trileaflet aortic valve. No aortic valve\par regurgitation seen.\par 3. Mild segmental left ventricular systolic dysfunction.\par The mid inferolateral wall, the basal  inferolateral wall,\par the basal anterolateral wall, and the mid anterolateral\par wall are hypokinetic.\par 4. Mild diastolic dysfunction (Stage I).\par 5. Normal right ventricular size and function.\par *** No previous Echo exam. \par \par [facility] 4/1/2019 \par \par LM:   --  Distal left main: Angiography showed minor luminal irregularities\par with no flow limiting lesions.\par LAD:   --  Proximal LAD: There was a diffuse 100 % stenosis. This is a\par likely culprit for the patient's clinical presentation.\par --  D1: There was a tubular 90 % stenosis.\par --  D2: There was a discrete 90 % stenosis at the ostium of the vessel\par segment.\par CX:   --  Proximal circumflex: There was a discrete 80 % stenosis.\par --  OM1: Angiography showed minor luminal irregularities with no flow\par limiting lesions.\par --  OM2: Angiography showed minor luminal irregularities with no flow\par limiting lesions.\par RCA:   --  Proximal RCA: Angiography showed minor luminal irregularities\par with no flow limiting lesions.\par --  Mid RCA: Angiography showed mild atherosclerosis with no flow limiting\par lesions.\par --  RPLS: Angiography showed minor luminal irregularities with no flow\par limiting lesions.\par COMPLICATIONS: There were no complications.\par DIAGNOSTIC IMPRESSIONS: Severe CAD. Occluded LAD. Severe diag and prox LCx\par disesae. No LV gram due to severe CKD.\par INTERVENTIONAL RECOMMENDATIONS: Aspirin and Brilinta

## 2020-06-30 NOTE — PHYSICAL EXAM
[General Appearance - Well Developed] : well developed [General Appearance - Well Nourished] : well nourished [Normal Appearance] : normal appearance [Normal Conjunctiva] : the conjunctiva exhibited no abnormalities [Normal Oral Mucosa] : normal oral mucosa [Heart Rate And Rhythm] : heart rate and rhythm were normal [Heart Sounds] : normal S1 and S2 [Auscultation Breath Sounds / Voice Sounds] : lungs were clear to auscultation bilaterally [Murmurs] : no murmurs present [Abdomen Tenderness] : non-tender [Bowel Sounds] : normal bowel sounds [Abnormal Walk] : normal gait [Nail Clubbing] : no clubbing of the fingernails [Cyanosis, Localized] : no localized cyanosis [Petechial Hemorrhages (___cm)] : no petechial hemorrhages [] : no ischemic changes [Oriented To Time, Place, And Person] : oriented to person, place, and time [Affect] : the affect was normal [Mood] : the mood was normal [No Anxiety] : not feeling anxious [FreeTextEntry1] : scattered ecchymoses

## 2020-06-30 NOTE — REVIEW OF SYSTEMS
[Negative] : Psychiatric [see HPI] : see HPI [Shortness Of Breath] : no shortness of breath [Feeling Fatigued] : not feeling fatigued [Dyspnea on exertion] : not dyspnea during exertion [Chest  Pressure] : no chest pressure [Lower Ext Edema] : no extremity edema [Cough] : no cough [Excessive Thirst] : no polydipsia

## 2020-07-02 DIAGNOSIS — I10 ESSENTIAL (PRIMARY) HYPERTENSION: ICD-10-CM

## 2020-10-06 ENCOUNTER — APPOINTMENT (OUTPATIENT)
Dept: CARDIOLOGY | Facility: HOSPITAL | Age: 80
End: 2020-10-06

## 2020-10-13 ENCOUNTER — RX RENEWAL (OUTPATIENT)
Age: 80
End: 2020-10-13

## 2020-12-03 NOTE — PROGRESS NOTE ADULT - ASSESSMENT
Called patient to reschedule missed appointment.  No answer; left message instructing patient to call back PharmD at [phone] to reschedule missed appointment.   79M Latvian speaking w/ hx of CAD s/p 2DES May 2019, DM2, HTN, HLD, CHF, Hypothyroid p/w unstable angina stated

## 2021-01-05 ENCOUNTER — OUTPATIENT (OUTPATIENT)
Dept: OUTPATIENT SERVICES | Facility: HOSPITAL | Age: 81
LOS: 1 days | End: 2021-01-05
Payer: MEDICARE

## 2021-01-05 ENCOUNTER — APPOINTMENT (OUTPATIENT)
Dept: CARDIOLOGY | Facility: HOSPITAL | Age: 81
End: 2021-01-05

## 2021-01-05 VITALS
DIASTOLIC BLOOD PRESSURE: 70 MMHG | HEART RATE: 60 BPM | HEIGHT: 68 IN | SYSTOLIC BLOOD PRESSURE: 138 MMHG | OXYGEN SATURATION: 97 %

## 2021-01-05 DIAGNOSIS — Z98.890 OTHER SPECIFIED POSTPROCEDURAL STATES: Chronic | ICD-10-CM

## 2021-01-05 DIAGNOSIS — I25.10 ATHEROSCLEROTIC HEART DISEASE OF NATIVE CORONARY ARTERY WITHOUT ANGINA PECTORIS: ICD-10-CM

## 2021-01-05 PROCEDURE — G0463: CPT

## 2021-01-05 RX ORDER — TICAGRELOR 90 MG/1
90 TABLET ORAL TWICE DAILY
Qty: 60 | Refills: 3 | Status: DISCONTINUED | COMMUNITY
Start: 2019-04-24 | End: 2021-01-05

## 2021-01-08 DIAGNOSIS — I10 ESSENTIAL (PRIMARY) HYPERTENSION: ICD-10-CM

## 2021-01-11 NOTE — PHYSICAL EXAM
[General Appearance - Well Developed] : well developed [Normal Appearance] : normal appearance [General Appearance - Well Nourished] : well nourished [Normal Conjunctiva] : the conjunctiva exhibited no abnormalities [Normal Oral Mucosa] : normal oral mucosa [Auscultation Breath Sounds / Voice Sounds] : lungs were clear to auscultation bilaterally [Heart Rate And Rhythm] : heart rate and rhythm were normal [Heart Sounds] : normal S1 and S2 [Murmurs] : no murmurs present [Bowel Sounds] : normal bowel sounds [Abdomen Tenderness] : non-tender [Abnormal Walk] : normal gait [Nail Clubbing] : no clubbing of the fingernails [Cyanosis, Localized] : no localized cyanosis [Petechial Hemorrhages (___cm)] : no petechial hemorrhages [] : no ischemic changes [Oriented To Time, Place, And Person] : oriented to person, place, and time [Affect] : the affect was normal [Mood] : the mood was normal [No Anxiety] : not feeling anxious [FreeTextEntry1] : scattered ecchymoses

## 2021-01-11 NOTE — REVIEW OF SYSTEMS
[see HPI] : see HPI [Negative] : Psychiatric [Feeling Fatigued] : not feeling fatigued [Shortness Of Breath] : no shortness of breath [Dyspnea on exertion] : not dyspnea during exertion [Chest  Pressure] : no chest pressure [Lower Ext Edema] : no extremity edema [Cough] : no cough [Excessive Thirst] : no polydipsia

## 2021-01-11 NOTE — HISTORY OF PRESENT ILLNESS
[FreeTextEntry1] : Mr Resendez is an 79 yo M with a PMH significant for CAD (s/p PCI 2000, NSTEMI 4/2019 s/p PCI x 2 to LAD and staged PCI to OM1 in 5/2019), ICM (EF 45-50%, normal RV), CKD stage IV (B/L Scr 3-4.2), DM2 (A1c 6.3 on 4/2019), HTN, who presents for follow up.\par \par Patient was last seen in June and at the time complained of fatigue with walking. His coreg was decreased to 6.25mg in case it was contributing to symptoms and he states that his symptoms resolved after the decrease. Unfortunately, he was admitted to the hospital([facility]) in late December and was found to have a pelvic fracture. Brilinta discontinued and patient discharged home.\par \par He complains of significant pain with walking so he has been fairly sedentary since discharge. Otherwise, he has no complaints.\par \par Current medications:\par ASA 81mg daily \par [id number] daily\par carvedilol 12.5mg BID\par tradjenta 5mg daily\par amlodipine 5mg daily\par lisinopril 10mg daily\par hydralazine 100mg TID\par Imdur 30mg daily\par levothyroxine 175mg\par \par \par Cardiac Hx \par \par LHC 5/6/2019 . MACI to OM1 \par \par RHC 5/6/2019\par \par RA 6\par RV 39/12\par PA 38/14/21\par PCWP 9 \par PA 63\par CO 6.36/CI 2.75 \par SVR 1182 \par \par TTE 4/2/2019 \par  Conclusions:\par 1. Mitral valve not well visualized, probably normal.\par Minimal mitral regurgitation.\par 2. Normal trileaflet aortic valve. No aortic valve\par regurgitation seen.\par 3. Mild segmental left ventricular systolic dysfunction.\par The mid inferolateral wall, the basal  inferolateral wall,\par the basal anterolateral wall, and the mid anterolateral\par wall are hypokinetic.\par 4. Mild diastolic dysfunction (Stage I).\par 5. Normal right ventricular size and function.\par *** No previous Echo exam. \par \par [facility] 4/1/2019 \par \par LM:   --  Distal left main: Angiography showed minor luminal irregularities\par with no flow limiting lesions.\par LAD:   --  Proximal LAD: There was a diffuse 100 % stenosis. This is a\par likely culprit for the patient's clinical presentation.\par --  D1: There was a tubular 90 % stenosis.\par --  D2: There was a discrete 90 % stenosis at the ostium of the vessel\par segment.\par CX:   --  Proximal circumflex: There was a discrete 80 % stenosis.\par --  OM1: Angiography showed minor luminal irregularities with no flow\par limiting lesions.\par --  OM2: Angiography showed minor luminal irregularities with no flow\par limiting lesions.\par RCA:   --  Proximal RCA: Angiography showed minor luminal irregularities\par with no flow limiting lesions.\par --  Mid RCA: Angiography showed mild atherosclerosis with no flow limiting\par lesions.\par --  RPLS: Angiography showed minor luminal irregularities with no flow\par limiting lesions.\par COMPLICATIONS: There were no complications.\par DIAGNOSTIC IMPRESSIONS: Severe CAD. Occluded LAD. Severe diag and prox LCx\par disesae. No LV gram due to severe CKD.\par INTERVENTIONAL RECOMMENDATIONS: Aspirin and Brilinta

## 2021-05-11 ENCOUNTER — APPOINTMENT (OUTPATIENT)
Dept: CARDIOLOGY | Facility: HOSPITAL | Age: 81
End: 2021-05-11

## 2021-05-11 ENCOUNTER — OUTPATIENT (OUTPATIENT)
Dept: OUTPATIENT SERVICES | Facility: HOSPITAL | Age: 81
LOS: 1 days | End: 2021-05-11
Payer: MEDICARE

## 2021-05-11 VITALS
OXYGEN SATURATION: 95 % | SYSTOLIC BLOOD PRESSURE: 121 MMHG | HEART RATE: 66 BPM | WEIGHT: 237 LBS | BODY MASS INDEX: 35.92 KG/M2 | HEIGHT: 68 IN | DIASTOLIC BLOOD PRESSURE: 71 MMHG

## 2021-05-11 DIAGNOSIS — Z98.890 OTHER SPECIFIED POSTPROCEDURAL STATES: Chronic | ICD-10-CM

## 2021-05-11 DIAGNOSIS — I25.10 ATHEROSCLEROTIC HEART DISEASE OF NATIVE CORONARY ARTERY WITHOUT ANGINA PECTORIS: ICD-10-CM

## 2021-05-11 PROCEDURE — G0463: CPT

## 2021-05-18 ENCOUNTER — APPOINTMENT (OUTPATIENT)
Dept: NEPHROLOGY | Facility: CLINIC | Age: 81
End: 2021-05-18
Payer: MEDICARE

## 2021-05-18 VITALS
OXYGEN SATURATION: 96 % | HEART RATE: 66 BPM | TEMPERATURE: 97.6 F | SYSTOLIC BLOOD PRESSURE: 125 MMHG | WEIGHT: 233.69 LBS | BODY MASS INDEX: 35.53 KG/M2 | DIASTOLIC BLOOD PRESSURE: 70 MMHG

## 2021-05-18 DIAGNOSIS — I10 ESSENTIAL (PRIMARY) HYPERTENSION: ICD-10-CM

## 2021-05-18 DIAGNOSIS — R60.0 LOCALIZED EDEMA: ICD-10-CM

## 2021-05-18 PROCEDURE — 99214 OFFICE O/P EST MOD 30 MIN: CPT

## 2021-05-18 NOTE — HISTORY OF PRESENT ILLNESS
[FreeTextEntry1] : follow up ckd 4/5 \par \par 79 yo Cambodian speaking male with PMH HTN, DMT2, CAD with stents x2 (2000), hypothyroid, depression, transferred from [facility] for NSTEMI and hypertensive urgency s/p CCU stay requiring on nitro gtt s/p PCI with MACI x 2 to [facility] 4/1. Baseline creat in sep 2018 was 2.0. admitted with a creat of 3.0 likely in the setting of malignant hypertension which got worse due to RAMIRO. Discharged with a creat of 3.4 on 4/8/2019 and has beeni in the 3s range. [facility] 5/2019\par \par last labs done by his PCP on 5/4/21- showed a creat of 3.8 mg/dl/ bun - 63\par Na- 141, K- 4.7, CO2-24, Calcium- 8.8,Hb- 10.8 \par \par since his last visit, he has done okay. he received his covid vaccine. overall doing well. states he is eating well. \par \par ROS \par  - No changes in urination, no blood in urine, no foamy urine\par cvs- no chest pain, no shortness of breath \par all other systems reviewed and were negative in detail except as above

## 2021-05-18 NOTE — PHYSICAL EXAM
[General Appearance - Alert] : alert [General Appearance - In No Acute Distress] : in no acute distress [General Appearance - Well Nourished] : well nourished [General Appearance - Well Developed] : well developed [Sclera] : the sclera and conjunctiva were normal [PERRL With Normal Accommodation] : pupils were equal in size, round, and reactive to light [Outer Ear] : the ears and nose were normal in appearance [Hearing Threshold Finger Rub Not [place]] : hearing was normal [Examination Of The Oral Cavity] : the lips and gums were normal [Neck Appearance] : the appearance of the neck was normal [Neck Cervical Mass (___cm)] : no neck mass was observed [Jugular Venous Distention Increased] : there was no jugular-venous distention [Respiration, Rhythm And Depth] : normal respiratory rhythm and effort [Exaggerated Use Of Accessory Muscles For Inspiration] : no accessory muscle use [Auscultation Breath Sounds / Voice Sounds] : lungs were clear to auscultation bilaterally [Heart Rate And Rhythm] : heart rate was normal and rhythm regular [Heart Sounds] : normal S1 and S2 [Heart Sounds Gallop] : no gallops [Arterial Pulses Carotid] : carotid pulses were normal with no bruits [Edema] : there was no peripheral edema [Bowel Sounds] : normal bowel sounds [Abdomen Soft] : soft [Abdomen Tenderness] : non-tender [No CVA Tenderness] : no ~M costovertebral angle tenderness [No Spinal Tenderness] : no spinal tenderness [Abnormal Walk] : normal gait [Nail Clubbing] : no clubbing  or cyanosis of the fingernails [Musculoskeletal - Swelling] : no joint swelling seen [Motor Tone] : muscle strength and tone were normal [Skin Color & Pigmentation] : normal skin color and pigmentation [Skin Turgor] : normal skin turgor [] : no rash [Skin Lesions] : no skin lesions [Cranial Nerves] : cranial nerves 2-12 were intact [Deep Tendon Reflexes (DTR)] : deep tendon reflexes were 2+ and symmetric [Sensation] : the sensory exam was normal to light touch and pinprick [Motor Exam] : the motor exam was normal [Oriented To Time, Place, And Person] : oriented to person, place, and time [Impaired Insight] : insight and judgment were intact [Affect] : the affect was normal

## 2021-05-31 NOTE — HISTORY OF PRESENT ILLNESS
[FreeTextEntry1] : Mr Mal is an 81 yo M with a PMH significant for CAD (s/p PCI 2000, NSTEMI 4/2019 s/p PCI x 2 to LAD and staged PCI to OM1 in 5/2019), ICM (EF 45-50%, normal RV), CKD stage IV (B/L Scr 3-4.2), DM2 (A1c 6.3 on 4/2019), HTN, who presents for follow up. Patient accompanied by his daughter. He has been more mobile since his pelvic fracture in December 2020. He walks daily but is easily fatigued per daughter. \par \par Also of note, patient has been taking furosemide 40mg PRN LE edema. He is scheduled to follow up with nephrology for his CKD next week.\par \par He otherwise denies chest pain, SOB, palpitations.\par \par \par \par HDL 42\par LDL 71\par A1C 5.8\par \par \par Current medications:\par ASA 81mg daily \par [id number] daily\par carvedilol 12.5mg BID\par tradjenta 5mg daily\par amlodipine 5mg daily\par lisinopril 10mg daily\par hydralazine 100mg TID\par Imdur 30mg daily\par levothyroxine 175mg\par \par \par Cardiac Hx \par \par LHC 5/6/2019 . MACI to OM1 \par \par RHC 5/6/2019\par \par RA 6\par RV 39/12\par PA 38/14/21\par PCWP 9 \par PA 63\par CO 6.36/CI 2.75 \par SVR 1182 \par \par TTE 4/2/2019 \par  Conclusions:\par 1. Mitral valve not well visualized, probably normal.\par Minimal mitral regurgitation.\par 2. Normal trileaflet aortic valve. No aortic valve\par regurgitation seen.\par 3. Mild segmental left ventricular systolic dysfunction.\par The mid inferolateral wall, the basal  inferolateral wall,\par the basal anterolateral wall, and the mid anterolateral\par wall are hypokinetic.\par 4. Mild diastolic dysfunction (Stage I).\par 5. Normal right ventricular size and function.\par *** No previous Echo exam. \par \par [facility] 4/1/2019 \par \par LM:   --  Distal left main: Angiography showed minor luminal irregularities\par with no flow limiting lesions.\par LAD:   --  Proximal LAD: There was a diffuse 100 % stenosis. This is a\par likely culprit for the patient's clinical presentation.\par --  D1: There was a tubular 90 % stenosis.\par --  D2: There was a discrete 90 % stenosis at the ostium of the vessel\par segment.\par CX:   --  Proximal circumflex: There was a discrete 80 % stenosis.\par --  OM1: Angiography showed minor luminal irregularities with no flow\par limiting lesions.\par --  OM2: Angiography showed minor luminal irregularities with no flow\par limiting lesions.\par RCA:   --  Proximal RCA: Angiography showed minor luminal irregularities\par with no flow limiting lesions.\par --  Mid RCA: Angiography showed mild atherosclerosis with no flow limiting\par lesions.\par --  RPLS: Angiography showed minor luminal irregularities with no flow\par limiting lesions.\par COMPLICATIONS: There were no complications.\par DIAGNOSTIC IMPRESSIONS: Severe CAD. Occluded LAD. Severe diag and prox LCx\par disesae. No LV gram due to severe CKD.\par INTERVENTIONAL RECOMMENDATIONS: Aspirin and Brilinta

## 2021-05-31 NOTE — ADDENDUM
[FreeTextEntry1] : I have made amendments to the documentation where necessary, and agree with the history, physical exam, and plan as documented by the Fellow. \par \par Amita Levi MD, MPH, CK, RPVI, FACC\par Cardiovascular Specialist \par [facility] \par [facility] ([facility])\par [facility]\par c: [phone] (text preferred and/or call)\par e: [email or web address]\par

## 2021-07-08 NOTE — ED PROVIDER NOTE - PROGRESS NOTE DETAILS
PROGRESS NOTE         Patient Identification:  Name:  Marce Gutierrez  Age:  30 y.o.  Sex:  female  :  1991  MRN:  [id number]  Visit Number:  [id number]  Primary Care Provider:  Provider, No Known         LOS: 20 days       ----------------------------------------------------------------------------------------------------------------------  Subjective       Chief Complaints:    Leg Pain and Arm Pain        Interval History:      Patient sitting up in bed this morning.  No issues or complaints.  Afebrile, no diarrhea.  WBC normal.  CRP normal.  KATHRIN planned for 21.     Review of Systems:    Constitutional: no fever, chills and night sweats. No appetite change or unexpected weight change. No fatigue.  Eyes: no eye drainage, itching or redness.  HEENT: no mouth sores, dysphagia or nose bleed.  Respiratory: no for shortness of breath, cough or production of sputum.  Cardiovascular: no chest pain and no orthopnea.  Palpitations.  Gastrointestinal: no nausea, vomiting or diarrhea. No abdominal pain, hematemesis or rectal bleeding.  Genitourinary: no dysuria or polyuria.  Hematologic/lymphatic: no lymph node abnormalities, no easy bruising or easy bleeding.  Musculoskeletal:  No muscle or joint pain.  Skin: No rash and no itching.  Neurological: no loss of consciousness, no seizure, no headache.  Behavioral/Psych: no depression or suicidal ideation.  Endocrine: no hot flashes.  Immunologic: negative.     ----------------------------------------------------------------------------------------------------------------------      Objective       Current [facility] Meds:  ceFAZolin, 2 g, Intravenous, Q8H  docusate sodium, 100 mg, Oral, BID  enoxaparin, 40 mg, Subcutaneous, Daily  folic acid, 1 mg, Oral, Daily  iron polysaccharides, 150 mg, Oral, Daily  lactobacillus acidophilus, 1 capsule, Oral, Daily  multivitamin with minerals, 1 tablet, Oral, Daily  polyethylene glycol, 17 g, Oral, Daily  sodium chloride,  10 mL, Intravenous, Q12H  sodium chloride, 3 mL, Intravenous, Q12H         ----------------------------------------------------------------------------------------------------------------------    Vital Signs:  Temp:  [97.3 °F (36.3 °C)-98.4 °F (36.9 °C)] 97.4 °F (36.3 °C)  Heart Rate:  [] 77  Resp:  [16-18] 16  BP: (111-164)/(63-96) 120/71  Mean Arterial Pressure (Non-Invasive) for the past 24 hrs (Last 3 readings):   Noninvasive MAP (mmHg)   07/08/21 1013 86   07/08/21 0613 86   07/08/21 0315 112     SpO2 Percentage    07/07/21 1830 07/08/21 0315 07/08/21 1013   SpO2: 99% 99% 93%     SpO2:  [93 %-99 %] 93 %  on   ;   Device (Oxygen Therapy): room air    Body mass index is 20.27 kg/m².  Wt Readings from Last 3 Encounters:   07/08/21 57 kg (125 lb 9.6 oz)   10/30/19 63.5 kg (140 lb)        Intake/Output Summary (Last 24 hours) at 7/8/2021 1055  Last data filed at 7/8/2021 0900  Gross per 24 hour   Intake 1440 ml   Output 350 ml   Net 1090 ml     Diet Regular  NPO Diet  ----------------------------------------------------------------------------------------------------------------------      Physical Exam:    Constitutional:  Well-developed and well-nourished.  No respiratory distress.      HENT:  Head: Normocephalic and atraumatic.  Mouth:  Moist mucous membranes.    Eyes:  Conjunctivae and EOM are normal.  No scleral icterus.  Neck:  Neck supple.  No JVD present.  Cardiovascular:  Normal rate, regular rhythm and normal heart sounds with no murmur. No edema.  Pulmonary/Chest:  No respiratory distress, no wheezes, no crackles, with normal breath sounds and good air movement.  Abdominal:  Soft.  Bowel sounds are normal.  No distension and no tenderness.   Musculoskeletal:  No edema, no tenderness, and no deformity.  No swelling or redness of joints.    Neurological:  Alert and oriented to person, place, and time.  No facial droop.  No slurred speech.   Skin:  Skin is warm and dry.  No rash noted.  No pallor.    Psychiatric:  Normal mood and affect.  Behavior is normal.  ----------------------------------------------------------------------------------------------------------------------              Results from last 7 days   Lab Units 07/08/21  0114 07/07/21  0250 07/06/21  0036 07/05/21  0100   CRP mg/dL <0.30 0.35 0.62* 0.71*   WBC 10*3/mm3 10.77  --  11.09* 11.25*   HEMOGLOBIN g/dL 9.4*  --  9.2* 9.2*   HEMATOCRIT % 31.1*  --  29.3* 29.5*   MCV fL 95.1  --  93.6 95.2   MCHC g/dL 30.2*  --  31.4* 31.2*   PLATELETS 10*3/mm3 438  --  526* 518*     Results from last 7 days   Lab Units 07/08/21  0114 07/06/21  0036 07/05/21  0100 07/03/21  1600 07/03/21  0408 07/02/21  0109   SODIUM mmol/L 138 139 138  --  137 138   POTASSIUM mmol/L 4.0 3.8 3.5  --  4.2 3.9   MAGNESIUM mg/dL  --   --   --  1.8  --   --    CHLORIDE mmol/L 103 104 104  --  103 105   CO2 mmol/L 26.6 24.5 26.0  --  25.1 25.4   BUN mg/dL 15 18 14  --  15 15   CREATININE mg/dL 0.59 0.72 0.66  --  0.72 0.56*   EGFR IF NONAFRICN AM mL/min/1.73 120 95 105  --  95 127   CALCIUM mg/dL 8.5* 8.8 8.6  --  8.5* 8.5*   GLUCOSE mg/dL 96 81 92  --  96 96   ALBUMIN g/dL  --  3.26*  --   --  2.84* 2.79*   BILIRUBIN mg/dL  --  0.2  --   --  0.2 0.2   ALK PHOS U/L  --  130*  --   --  124* 124*   AST (SGOT) U/L  --  19  --   --  17 20   ALT (SGPT) U/L  --  11  --   --  11 13   Estimated Creatinine Clearance: 125.5 mL/min (by C-G formula based on SCr of 0.59 mg/dL).  No results found for: AMMONIA    No results found for: HGBA1C, POCGLU  Lab Results   Component Value Date    HGBA1C 5.60 06/17/2021     Lab Results   Component Value Date    TSH 0.718 06/17/2021       Blood Culture   Date Value Ref Range Status   06/20/2021 Abnormal Stain (C)  Preliminary   06/19/2021 Staphylococcus aureus (C)  Final     Comment:     Infectious disease consultation is highly recommended to rule out distant foci of infection.   06/17/2021 Staphylococcus aureus (C)  Final     Comment:     Infectious  disease consultation is highly recommended to rule out distant foci of infection.   06/17/2021 Staphylococcus aureus (C)  Final     Comment:     Infectious disease consultation is highly recommended to rule out distant foci of infection.  Refer to previous blood culture collected on 6/17/2021 2318 for MICs.     Urine Culture   Date Value Ref Range Status   06/17/2021 >100,000 CFU/mL Escherichia coli (A)  Final   06/17/2021 >100,000 CFU/mL Staphylococcus aureus (A)  Corrected     No results found for: WOUNDCX  No results found for: STOOLCX  No results found for: RESPCX  Pain Management Panel       Pain Management Panel Latest Ref Rng & Units 7/4/2021 6/17/2021    AMPHETAMINES SCREEN, URINE Negative Negative Positive(A)    BARBITURATES SCREEN Negative Negative Negative    BENZODIAZEPINE SCREEN, URINE Negative Negative Negative    BUPRENORPHINEUR Negative Negative Negative    COCAINE SCREEN, URINE Negative Negative Negative    METHADONE SCREEN, URINE Negative Negative Negative              ----------------------------------------------------------------------------------------------------------------------  Imaging Results (Last 24 Hours)       ** No results found for the last 24 hours. **            ----------------------------------------------------------------------------------------------------------------------    Assessment/Plan       Assessment/Plan     ASSESSMENT:    1.  Severe sepsis with lactic acid greater than 2 on admission  2.  Complicated MSSA bacteremia  3.  Likely underlying endocarditis  4.  Septic emboli    PLAN:    Patient sitting up in bed this morning.  No issues or complaints.  Afebrile, no diarrhea.  WBC normal.  CRP normal.  KATHRIN planned for 7/9/21.     Blood cultures from 6/24/2021 finalized as no growth. Blood culture from 6/22/2021 1 out of 1 set positive for MSSA.    Urinalysis on 6/17/2021 was positive and urine culture finalized as greater than 100,000 colonies of E. coli and greater than  100,000 colonies of MRSA.  Blood cultures on 6/17/2021 finalized as MSSA.  Repeat blood cultures on 6/19/2021 and 6/20/2021 are still showing growth.  COVID-19 and flu A/B PCR on 6/18/2021 detected COVID-19.  Mycoplasma pneumoniae antibody on 6/18/2021 was negative.  Strep pneumo antigen on 6/18/2021 was negative.  CT chest with PE protocol on 6/18/2021 showed technically degraded exam, but no definite PE is seen, and there is no aortic dissection.  Pulmonary edema with a trace amount of right pleural fluid.  Poorly defined nodular infiltrates on both sides of the chest suspicious for septic emboli.  Continued follow-up is recommended. MRI of the lumbar spine on 7/2/2021 revealed no acute findings.     Contacted micro lab regarding urine culture on 6/17/2021.  Urine culture has finalized as MSSA, but MRSA verbiage was included underneath and micro lab corrected.    COVID-19 diagnosis was incidental and patient remains asymptomatic.  Patient is now out of isolation as of 6/28/2021.    Recommend to continue Cefazolin monotherapy. We will follow closely and adjust antibiotic therapy as appropriate.  Continue to recommend KATHRIN and will follow up on results.  If KATHRIN is clear of vegetations or abscesses, patient will need 4 weeks of IV antibiotics from blood culture clearing date.  If KATHRIN shows any vegetations or abscesses, patient would require 4 to 6 weeks of IV antibiotic therapy from blood culture clearing date.    Code Status:   Code Status and Medical Interventions:   Ordered at: 06/18/21 0446     Level Of Support Discussed With:    Patient     Code Status:    CPR     Medical Interventions (Level of Support Prior to Arrest):    Full       Scribed for Dr. Ila Gold.    Donna Carcamo, APRN  07/08/21  10:55 EDT    Physician Attestation:    I have personally performed a face-to-face evaluation on this patient. I have collected the review of systems and performed my own physical exam. I reviewed the patient's data  including history of present illness, past medical history, past surgical history and allergy list. . The assessment and plan documented above are my own after discussing the case in detail with the APC. I have reviewed the laboratory and radiological pertinent results. I have reviewed and edited the note above after discussing the findings with the APC.    Ila Gold MD  Infectious Diseases  07/08/21  16:24 EDT     Attending Valarie Calix: cards fellow called upon arrial Attending Valarie Calix: pt seen by cards fellow, recommends continued BP control, may require CCU

## 2021-08-16 ENCOUNTER — APPOINTMENT (OUTPATIENT)
Dept: NEPHROLOGY | Facility: CLINIC | Age: 81
End: 2021-08-16
Payer: MEDICARE

## 2021-08-16 VITALS
TEMPERATURE: 98.1 F | WEIGHT: 247 LBS | DIASTOLIC BLOOD PRESSURE: 63 MMHG | OXYGEN SATURATION: 97 % | HEIGHT: 68 IN | BODY MASS INDEX: 37.44 KG/M2 | HEART RATE: 68 BPM | SYSTOLIC BLOOD PRESSURE: 138 MMHG

## 2021-08-16 PROCEDURE — 99214 OFFICE O/P EST MOD 30 MIN: CPT

## 2021-08-16 NOTE — HISTORY OF PRESENT ILLNESS
[FreeTextEntry1] : follow up ckd 4/5 \par \par 81 yo [place]ian speaking male with PMH HTN, DMT2, CAD with stents x2 (2000), hypothyroid, depression, transferred from [facility] for NSTEMI and hypertensive urgency s/p CCU stay requiring on nitro gtt s/p PCI with MACI x 2 to [facility] 4/1. Baseline creat in sep 2018 was 2.0. admitted with a creat of 3.0 likely in the setting of malignant hypertension which got worse due to RAMIRO. Discharged with a creat of 3.4 on 4/8/2019 and has beeni in the 3s range. [facility] 5/2019\par \par last labs done by his PCP on 5/4/21- showed a creat of 3.8 mg/dl/ bun - 63\par Na- 141, K- 4.7, CO2-24, Calcium- 8.8,Hb- 10.8 \par \par his last visit with me was in May 2021 \par \par since that time, he has overall done well. his only complaint today is increased leg swelling. He is currently on Lasix 40 mg daily. overall he hasn't feel his normal self since he was diagnosed with heart disease but nothing much has changed since then. he is eating normally and has no nausea/ vomiting. \par \par ros - \par gu- no changes in urination, no blood in urine\par CVS- No chest pain, no shortness of breath \par all other systems reviewed in detail and were negative except as above

## 2021-08-16 NOTE — ASSESSMENT
[FreeTextEntry1] : 79 yo male with ckd 4 likely sec to diabetic nephropathy, HTN, CAD with stents x2 (2000), hypothyroid, depression, transferred from [facility] for NSTEMI and hypertensive urgency s/p CCU stay requiring on nitro gtt s/p PCI with MACI x 2 to pLAD 4/1 with ALDEN \par \par CKD 4- Likely sec to diabetic nephropathy. serological work up negative. ALDEN likely sec to malignant hypertension and worsened by RAMIRO during cardiac cath. his creatinine has been in the 3s with the last creat of 3.8. he has no uremic s/s. I have discussed dialysis with him in the past and he has been reluctant considering age and co-morbidities. I have also discussed with her daughter who does not think dialysis would be a good option for him and I agree. Check labs today. Increase dose of Lasix to 80 mg daily. he has a cardiology appointment in 2 days. \par \par anemia- will check iron studies today. \par \par htn- BP is much better controlled. \par \par \par

## 2021-08-16 NOTE — PHYSICAL EXAM
[General Appearance - Alert] : alert [General Appearance - In No Acute Distress] : in no acute distress [General Appearance - Well Nourished] : well nourished [General Appearance - Well Developed] : well developed [Sclera] : the sclera and conjunctiva were normal [PERRL With Normal Accommodation] : pupils were equal in size, round, and reactive to light [Outer Ear] : the ears and nose were normal in appearance [Hearing Threshold Finger Rub Not [place]] : hearing was normal [Examination Of The Oral Cavity] : the lips and gums were normal [Neck Appearance] : the appearance of the neck was normal [Neck Cervical Mass (___cm)] : no neck mass was observed [Jugular Venous Distention Increased] : there was no jugular-venous distention [Respiration, Rhythm And Depth] : normal respiratory rhythm and effort [Exaggerated Use Of Accessory Muscles For Inspiration] : no accessory muscle use [Auscultation Breath Sounds / Voice Sounds] : lungs were clear to auscultation bilaterally [Heart Rate And Rhythm] : heart rate was normal and rhythm regular [Heart Sounds] : normal S1 and S2 [Heart Sounds Gallop] : no gallops [Arterial Pulses Carotid] : carotid pulses were normal with no bruits [Edema] : there was no peripheral edema [Bowel Sounds] : normal bowel sounds [Abdomen Soft] : soft [Abdomen Tenderness] : non-tender [No CVA Tenderness] : no ~M costovertebral angle tenderness [No Spinal Tenderness] : no spinal tenderness [Abnormal Walk] : normal gait [Nail Clubbing] : no clubbing  or cyanosis of the fingernails [Musculoskeletal - Swelling] : no joint swelling seen [Motor Tone] : muscle strength and tone were normal [Skin Color & Pigmentation] : normal skin color and pigmentation [Skin Turgor] : normal skin turgor [] : no rash [Skin Lesions] : no skin lesions [Cranial Nerves] : cranial nerves 2-12 were intact [Deep Tendon Reflexes (DTR)] : deep tendon reflexes were 2+ and symmetric [Sensation] : the sensory exam was normal to light touch and pinprick [Motor Exam] : the motor exam was normal [Oriented To Time, Place, And Person] : oriented to person, place, and time [Impaired Insight] : insight and judgment were intact [Affect] : the affect was normal

## 2021-08-18 ENCOUNTER — APPOINTMENT (OUTPATIENT)
Dept: CARDIOLOGY | Facility: HOSPITAL | Age: 81
End: 2021-08-18

## 2021-08-18 ENCOUNTER — OUTPATIENT (OUTPATIENT)
Dept: OUTPATIENT SERVICES | Facility: HOSPITAL | Age: 81
LOS: 1 days | End: 2021-08-18
Payer: MEDICARE

## 2021-08-18 VITALS — DIASTOLIC BLOOD PRESSURE: 62 MMHG | HEART RATE: 60 BPM | SYSTOLIC BLOOD PRESSURE: 105 MMHG

## 2021-08-18 DIAGNOSIS — I10 ESSENTIAL (PRIMARY) HYPERTENSION: ICD-10-CM

## 2021-08-18 DIAGNOSIS — Z98.890 OTHER SPECIFIED POSTPROCEDURAL STATES: Chronic | ICD-10-CM

## 2021-08-18 DIAGNOSIS — I25.10 ATHEROSCLEROTIC HEART DISEASE OF NATIVE CORONARY ARTERY WITHOUT ANGINA PECTORIS: ICD-10-CM

## 2021-08-18 DIAGNOSIS — R60.0 LOCALIZED EDEMA: ICD-10-CM

## 2021-08-18 PROCEDURE — 93005 ELECTROCARDIOGRAM TRACING: CPT

## 2021-08-18 PROCEDURE — G0463: CPT

## 2021-08-25 NOTE — HISTORY OF PRESENT ILLNESS
[FreeTextEntry1] : Mr Resendez is an 80M hx of CAD (s/p PCI 2000, NSTEMI 4/2019 s/p PCI x 2 to LAD and staged PCI to OM1 in 5/2019), ICM (EF 45-50%, normal RV), CKD stage IV (B/L Scr 3-4.2), DM2 (A1c 6.3 on 4/2019), HTN, who presents for follow up.  Patient accompanied by his daughter.  Pt was recently seen by  - nephrology 1 week ago where pt / daughter was instructed to increase Lasix 80mg PO daily for increased LE edema.  Pt was taking Lasix 40mg PO daily prior.  Per daughter, swelling has improving significantly.  Pt has no complaints and has been walking the dog few days a week with baseline dyspnea/fatigue.  No active or new complaints.  \par \par He otherwise denies chest pain, SOB, palpitations.\par \par Cardiac Hx \par \par C 5/6/2019 . MACI to OM1 \par \par RHC 5/6/2019\par \par RA 6\par RV 39/12\par PA 38/14/21\par PCWP 9 \par PA 63\par CO 6.36/CI 2.75 \par SVR 1182 \par \par TTE 4/2/2019 \par  Conclusions:\par 1. Mitral valve not well visualized, probably normal.\par Minimal mitral regurgitation.\par 2. Normal trileaflet aortic valve. No aortic valve\par regurgitation seen.\par 3. Mild segmental left ventricular systolic dysfunction.\par The mid inferolateral wall, the basal inferolateral wall,\par the basal anterolateral wall, and the mid anterolateral\par wall are hypokinetic.\par 4. Mild diastolic dysfunction (Stage I).\par 5. Normal right ventricular size and function.\par *** No previous Echo exam. \par \par [facility] 4/1/2019 \par \par LM: -- Distal left main: Angiography showed minor luminal irregularities\par with no flow limiting lesions.\par LAD: -- Proximal LAD: There was a diffuse 100 % stenosis. This is a\par likely culprit for the patient's clinical presentation.\par -- D1: There was a tubular 90 % stenosis.\par -- D2: There was a discrete 90 % stenosis at the ostium of the vessel\par segment.\par CX: -- Proximal circumflex: There was a discrete 80 % stenosis.\par -- OM1: Angiography showed minor luminal irregularities with no flow\par limiting lesions.\par -- OM2: Angiography showed minor luminal irregularities with no flow\par limiting lesions.\par RCA: -- Proximal RCA: Angiography showed minor luminal irregularities\par with no flow limiting lesions.\par -- Mid RCA: Angiography showed mild atherosclerosis with no flow limiting\par lesions.\par -- RPLS: Angiography showed minor luminal irregularities with no flow\par limiting lesions.\par COMPLICATIONS: There were no complications.\par DIAGNOSTIC IMPRESSIONS: Severe CAD. Occluded LAD. Severe diag and prox LCx\par disesae. No LV gram due to severe CKD.\par INTERVENTIONAL RECOMMENDATIONS: Aspirin and Brilinta \par  \par \par HDL 42\par LDL 71\par A1C 5.8\par

## 2021-08-25 NOTE — PHYSICAL EXAM
[Well Developed] : well developed [Well Nourished] : well nourished [No Acute Distress] : no acute distress [Normal Conjunctiva] : normal conjunctiva [Normal Venous Pressure] : normal venous pressure [No Carotid Bruit] : no carotid bruit [Normal S1, S2] : normal S1, S2 [No Rub] : no rub [No Murmur] : no murmur [No Gallop] : no gallop [Clear Lung Fields] : clear lung fields [Good Air Entry] : good air entry [No Respiratory Distress] : no respiratory distress  [Soft] : abdomen soft [Non Tender] : non-tender [No Masses/organomegaly] : no masses/organomegaly [Normal Bowel Sounds] : normal bowel sounds [Normal Gait] : normal gait [No Cyanosis] : no cyanosis [No Clubbing] : no clubbing [No Varicosities] : no varicosities [No Rash] : no rash [No Skin Lesions] : no skin lesions [Moves all extremities] : moves all extremities [No Focal Deficits] : no focal deficits [Normal Speech] : normal speech [Alert and Oriented] : alert and oriented [Normal memory] : normal memory [de-identified] : b/l LE pitting edema to just above ankle

## 2021-08-25 NOTE — ASSESSMENT
[FreeTextEntry1] : 80M hx of CAD (s/p PCI 2000, NSTEMI 4/2019 s/p PCI x 2 to LAD and staged PCI to OM1 in 5/2019), ICM (EF 45-50%, normal RV), CKD stage IV (B/L Scr 3-4.2), DM2 (A1c 6.3 on 4/2019), HTN, who presents for follow up.\par \par #CAD\par -dz as above\par -no new cardiac symptoms\par -c/w Coreg 6.25mg PO BID, Imdur 30mg PO daily, atorvastatin 10mg PO daily, ASA 81mg daily, lisinopril 10mg PO daily\par \par #HTN\par -well controlled\par -c/w amlodipine 5mg and meds above \par \par #LE edema\par -chronic but improving per daughter\par -suspect 2/2 progressing CKD / known mild HFrEF\par -c/w increased Lasix 80mg PO daily per renal\par -renal recs appreciated; likely not a poor candidate dialysis, will c/w ongoing diuretic tx\par -RTC 1-2mos\par \par Vaibhav Rosado MD

## 2021-08-25 NOTE — END OF VISIT
[] : Fellow [FreeTextEntry3] : Agree with cardiology fellow's assessment. Edema is likely a manifestation of CKD-stage 4 (most recent estimated GFR 17 cc/min). Symptomatic treatment with diuretics is appropriate.

## 2021-09-01 LAB
25(OH)D3 SERPL-MCNC: 28.7 NG/ML
ALBUMIN SERPL ELPH-MCNC: 4 G/DL
ANION GAP SERPL CALC-SCNC: 13 MMOL/L
APPEARANCE: CLEAR
BACTERIA: NEGATIVE
BASOPHILS # BLD AUTO: 0.04 K/UL
BASOPHILS NFR BLD AUTO: 0.7 %
BILIRUBIN URINE: NEGATIVE
BLOOD URINE: NEGATIVE
BUN SERPL-MCNC: 71 MG/DL
CALCIUM SERPL-MCNC: 9 MG/DL
CALCIUM SERPL-MCNC: 9 MG/DL
CHLORIDE SERPL-SCNC: 107 MMOL/L
CO2 SERPL-SCNC: 20 MMOL/L
COLOR: NORMAL
CREAT SERPL-MCNC: 3.54 MG/DL
CREAT SPEC-SCNC: 47 MG/DL
CREAT/PROT UR: 1.4 RATIO
EOSINOPHIL # BLD AUTO: 0.19 K/UL
EOSINOPHIL NFR BLD AUTO: 3.2 %
FERRITIN SERPL-MCNC: 74 NG/ML
GLUCOSE QUALITATIVE U: NEGATIVE
GLUCOSE SERPL-MCNC: 123 MG/DL
HCT VFR BLD CALC: 35.3 %
HGB BLD-MCNC: 11.2 G/DL
HYALINE CASTS: 0 /LPF
IMM GRANULOCYTES NFR BLD AUTO: 0.2 %
IRON SATN MFR SERPL: 28 %
IRON SERPL-MCNC: 68 UG/DL
KETONES URINE: NEGATIVE
LEUKOCYTE ESTERASE URINE: NEGATIVE
LYMPHOCYTES # BLD AUTO: 1.79 K/UL
LYMPHOCYTES NFR BLD AUTO: 30.5 %
MAN DIFF?: NORMAL
MCHC RBC-ENTMCNC: 31.7 GM/DL
MCHC RBC-ENTMCNC: 32.1 PG
MCV RBC AUTO: 101.1 FL
MICROSCOPIC-UA: NORMAL
MONOCYTES # BLD AUTO: 0.5 K/UL
MONOCYTES NFR BLD AUTO: 8.5 %
NEUTROPHILS # BLD AUTO: 3.34 K/UL
NEUTROPHILS NFR BLD AUTO: 56.9 %
NITRITE URINE: NEGATIVE
PARATHYROID HORMONE INTACT: 199 PG/ML
PH URINE: 6
PHOSPHATE SERPL-MCNC: 4.2 MG/DL
PLATELET # BLD AUTO: 139 K/UL
POTASSIUM SERPL-SCNC: 4.6 MMOL/L
PROT UR-MCNC: 66 MG/DL
PROTEIN URINE: ABNORMAL
RBC # BLD: 3.49 M/UL
RBC # FLD: 13.5 %
RED BLOOD CELLS URINE: 0 /HPF
SODIUM SERPL-SCNC: 141 MMOL/L
SPECIFIC GRAVITY URINE: 1.01
SQUAMOUS EPITHELIAL CELLS: 0 /HPF
TIBC SERPL-MCNC: 245 UG/DL
UIBC SERPL-MCNC: 177 UG/DL
UROBILINOGEN URINE: NORMAL
WBC # FLD AUTO: 5.87 K/UL
WHITE BLOOD CELLS URINE: 0 /HPF

## 2021-09-01 RX ORDER — ERGOCALCIFEROL 1.25 MG/1
1.25 MG CAPSULE ORAL
Qty: 12 | Refills: 0 | Status: ACTIVE | COMMUNITY
Start: 2021-09-01 | End: 1900-01-01

## 2021-10-27 ENCOUNTER — OUTPATIENT (OUTPATIENT)
Dept: OUTPATIENT SERVICES | Facility: HOSPITAL | Age: 81
LOS: 1 days | End: 2021-10-27
Payer: MEDICARE

## 2021-10-27 ENCOUNTER — APPOINTMENT (OUTPATIENT)
Dept: CARDIOLOGY | Facility: HOSPITAL | Age: 81
End: 2021-10-27

## 2021-10-27 VITALS
WEIGHT: 247 LBS | HEIGHT: 68 IN | HEART RATE: 65 BPM | SYSTOLIC BLOOD PRESSURE: 141 MMHG | DIASTOLIC BLOOD PRESSURE: 74 MMHG | BODY MASS INDEX: 37.44 KG/M2 | OXYGEN SATURATION: 98 %

## 2021-10-27 DIAGNOSIS — I25.10 ATHEROSCLEROTIC HEART DISEASE OF NATIVE CORONARY ARTERY WITHOUT ANGINA PECTORIS: ICD-10-CM

## 2021-10-27 DIAGNOSIS — Z98.890 OTHER SPECIFIED POSTPROCEDURAL STATES: Chronic | ICD-10-CM

## 2021-10-27 PROCEDURE — 93005 ELECTROCARDIOGRAM TRACING: CPT

## 2021-10-27 PROCEDURE — G0463: CPT

## 2021-11-03 ENCOUNTER — NON-APPOINTMENT (OUTPATIENT)
Age: 81
End: 2021-11-03

## 2021-11-03 RX ORDER — ATORVASTATIN CALCIUM 10 MG/1
10 TABLET, FILM COATED ORAL
Qty: 90 | Refills: 3 | Status: ACTIVE | COMMUNITY
Start: 2019-04-24 | End: 1900-01-01

## 2021-11-03 RX ORDER — LISINOPRIL 10 MG/1
10 TABLET ORAL DAILY
Qty: 90 | Refills: 3 | Status: ACTIVE | COMMUNITY
Start: 2019-07-31 | End: 1900-01-01

## 2021-11-03 RX ORDER — ISOSORBIDE MONONITRATE 30 MG/1
30 TABLET, EXTENDED RELEASE ORAL DAILY
Qty: 90 | Refills: 3 | Status: ACTIVE | COMMUNITY
Start: 2020-04-27 | End: 1900-01-01

## 2021-11-03 RX ORDER — AMLODIPINE BESYLATE 5 MG/1
5 TABLET ORAL DAILY
Qty: 90 | Refills: 3 | Status: ACTIVE | COMMUNITY
Start: 2021-01-05 | End: 1900-01-01

## 2021-11-04 DIAGNOSIS — I10 ESSENTIAL (PRIMARY) HYPERTENSION: ICD-10-CM

## 2021-11-05 ENCOUNTER — APPOINTMENT (OUTPATIENT)
Dept: NEPHROLOGY | Facility: CLINIC | Age: 81
End: 2021-11-05
Payer: MEDICARE

## 2021-11-05 VITALS
WEIGHT: 254.63 LBS | HEIGHT: 68 IN | OXYGEN SATURATION: 97 % | HEART RATE: 59 BPM | DIASTOLIC BLOOD PRESSURE: 64 MMHG | SYSTOLIC BLOOD PRESSURE: 146 MMHG | TEMPERATURE: 97.7 F | BODY MASS INDEX: 38.59 KG/M2

## 2021-11-05 PROCEDURE — 99215 OFFICE O/P EST HI 40 MIN: CPT

## 2021-11-05 NOTE — HISTORY OF PRESENT ILLNESS
[FreeTextEntry1] : follow up ckd 4/5 \par \par 79 yo [place] speaking male with PMH HTN, DMT2, CAD with stents x2 (2000), hypothyroid, depression, transferred from [facility] for NSTEMI and hypertensive urgency s/p CCU stay requiring on nitro gtt s/p PCI with MACI x 2 to pLAD 4/1. Baseline creat in sep 2018 was 2.0. admitted with a creat of 3.0 likely in the setting of malignant hypertension which got worse due to RAMIRO. Discharged with a creat of 3.4 on 4/8/2019 and has beeni in the 3s range. [facility] 5/2019\par \par I have discussed about dialysis with him at multiple points and he and his daughter have not been interested \par \par INTERIM EVENTS \par \par His leg swelling is improved although he still has it. saw cardiology last week and was told to change lasix from 40 bid to 80 daily. \par \par \par ROS\par  - No changes in urination, no blood in the urine. \par CVS- No chest pain, no shortness of breath \par GI - No nausea, no vomiting, no changes in appetite \par all other systems reviewed in detail and were negative except as above

## 2021-11-05 NOTE — ASSESSMENT
[FreeTextEntry1] : 79 yo male with ckd 4 likely sec to diabetic nephropathy, HTN, CAD with stents x2 (2000), hypothyroid, depression, transferred from [facility] for NSTEMI and hypertensive urgency s/p CCU stay requiring on nitro gtt s/p PCI with MACI x 2 to pLAD 4/1 with ALDEN \par \par CKD 4- Likely sec to diabetic nephropathy. serological work up negative. ALDEN likely sec to malignant hypertension and worsened by RAMIRO during cardiac cath. his creatinine has been in the 3s with the last creat of 3.8. he has no uremic s/s. I have discussed dialysis with him in the past and he has been reluctant considering age and co-morbidities. I have also discussed with her daughter who does not think dialysis would be a good option for him and I agree.\par \par no dialysis needs. \par \par the dose of Lasix was just increased to 80 mg daily. He will likely need 80 mg BID\par \par Labs today\par \par follow up 2 months. \par \par

## 2021-11-05 NOTE — PHYSICAL EXAM
[General Appearance - Alert] : alert [General Appearance - In No Acute Distress] : in no acute distress [General Appearance - Well Nourished] : well nourished [General Appearance - Well Developed] : well developed [Sclera] : the sclera and conjunctiva were normal [PERRL With Normal Accommodation] : pupils were equal in size, round, and reactive to light [Outer Ear] : the ears and nose were normal in appearance [Hearing Threshold Finger Rub Not [place]] : hearing was normal [Examination Of The Oral Cavity] : the lips and gums were normal [Neck Appearance] : the appearance of the neck was normal [Neck Cervical Mass (___cm)] : no neck mass was observed [Jugular Venous Distention Increased] : there was no jugular-venous distention [Respiration, Rhythm And Depth] : normal respiratory rhythm and effort [Exaggerated Use Of Accessory Muscles For Inspiration] : no accessory muscle use [Auscultation Breath Sounds / Voice Sounds] : lungs were clear to auscultation bilaterally [Heart Rate And Rhythm] : heart rate was normal and rhythm regular [Heart Sounds] : normal S1 and S2 [Heart Sounds Gallop] : no gallops [Arterial Pulses Carotid] : carotid pulses were normal with no bruits [FreeTextEntry1] : 2+ LE Edema  [Bowel Sounds] : normal bowel sounds [Abdomen Soft] : soft [No CVA Tenderness] : no ~M costovertebral angle tenderness [Abdomen Tenderness] : non-tender [No Spinal Tenderness] : no spinal tenderness [Abnormal Walk] : normal gait [Nail Clubbing] : no clubbing  or cyanosis of the fingernails [Musculoskeletal - Swelling] : no joint swelling seen [Motor Tone] : muscle strength and tone were normal [Skin Color & Pigmentation] : normal skin color and pigmentation [Skin Turgor] : normal skin turgor [] : no rash [Skin Lesions] : no skin lesions [Cranial Nerves] : cranial nerves 2-12 were intact [Deep Tendon Reflexes (DTR)] : deep tendon reflexes were 2+ and symmetric [Sensation] : the sensory exam was normal to light touch and pinprick [Motor Exam] : the motor exam was normal [Oriented To Time, Place, And Person] : oriented to person, place, and time [Impaired Insight] : insight and judgment were intact [Affect] : the affect was normal

## 2021-11-08 ENCOUNTER — RX RENEWAL (OUTPATIENT)
Age: 81
End: 2021-11-08

## 2021-11-08 LAB
25(OH)D3 SERPL-MCNC: 32.3 NG/ML
ALBUMIN SERPL ELPH-MCNC: 4.3 G/DL
ANION GAP SERPL CALC-SCNC: 17 MMOL/L
APPEARANCE: CLEAR
BACTERIA: NEGATIVE
BASOPHILS # BLD AUTO: 0.07 K/UL
BASOPHILS NFR BLD AUTO: 0.9 %
BILIRUBIN URINE: NEGATIVE
BLOOD URINE: NEGATIVE
BUN SERPL-MCNC: 83 MG/DL
CALCIUM SERPL-MCNC: 9.2 MG/DL
CALCIUM SERPL-MCNC: 9.2 MG/DL
CHLORIDE SERPL-SCNC: 104 MMOL/L
CO2 SERPL-SCNC: 21 MMOL/L
COLOR: NORMAL
CREAT SERPL-MCNC: 4.44 MG/DL
CREAT SPEC-SCNC: 83 MG/DL
CREAT/PROT UR: 1.4 RATIO
EOSINOPHIL # BLD AUTO: 0.29 K/UL
EOSINOPHIL NFR BLD AUTO: 3.5 %
FERRITIN SERPL-MCNC: 134 NG/ML
GLUCOSE QUALITATIVE U: NEGATIVE
GLUCOSE SERPL-MCNC: 170 MG/DL
HCT VFR BLD CALC: 34.8 %
HGB BLD-MCNC: 11 G/DL
HYALINE CASTS: 0 /LPF
IMM GRANULOCYTES NFR BLD AUTO: 0.4 %
IRON SATN MFR SERPL: 37 %
IRON SERPL-MCNC: 98 UG/DL
KETONES URINE: NEGATIVE
LEUKOCYTE ESTERASE URINE: NEGATIVE
LYMPHOCYTES # BLD AUTO: 2.15 K/UL
LYMPHOCYTES NFR BLD AUTO: 26.3 %
MAN DIFF?: NORMAL
MCHC RBC-ENTMCNC: 31.6 GM/DL
MCHC RBC-ENTMCNC: 32 PG
MCV RBC AUTO: 101.2 FL
MICROSCOPIC-UA: NORMAL
MONOCYTES # BLD AUTO: 0.62 K/UL
MONOCYTES NFR BLD AUTO: 7.6 %
NEUTROPHILS # BLD AUTO: 5.01 K/UL
NEUTROPHILS NFR BLD AUTO: 61.3 %
NITRITE URINE: NEGATIVE
PARATHYROID HORMONE INTACT: 315 PG/ML
PH URINE: 6.5
PHOSPHATE SERPL-MCNC: 4 MG/DL
PLATELET # BLD AUTO: 179 K/UL
POTASSIUM SERPL-SCNC: 4.6 MMOL/L
PROT UR-MCNC: 118 MG/DL
PROTEIN URINE: ABNORMAL
RBC # BLD: 3.44 M/UL
RBC # FLD: 13.5 %
RED BLOOD CELLS URINE: 1 /HPF
SODIUM SERPL-SCNC: 142 MMOL/L
SPECIFIC GRAVITY URINE: 1.01
SQUAMOUS EPITHELIAL CELLS: 0 /HPF
TIBC SERPL-MCNC: 266 UG/DL
UIBC SERPL-MCNC: 168 UG/DL
UROBILINOGEN URINE: NORMAL
WBC # FLD AUTO: 8.17 K/UL
WHITE BLOOD CELLS URINE: 0 /HPF

## 2021-11-08 RX ORDER — FUROSEMIDE 40 MG/1
40 TABLET ORAL
Qty: 180 | Refills: 1 | Status: ACTIVE | COMMUNITY
Start: 2019-04-24 | End: 1900-01-01

## 2021-11-10 NOTE — REASON FOR VISIT
[FreeTextEntry1] : 80M hx of CAD (s/p PCI 2000, NSTEMI 4/2019 s/p PCI x 2 to LAD and staged PCI to OM1 in 5/2019), ICM (EF 45-50%, normal RV), CKD stage IV (B/L Scr 3-4.2), DM2 (A1c 6.3 on 4/2019), HTN, who presents for follow up. Patient accompanied by his daughter. Pt was recently seen by  - nephrology 1 week ago where pt / daughter was instructed to increase Lasix 80mg PO daily for increased LE edema. Pt was taking Lasix 40mg PO daily prior. Per daughter, swelling has improving significantly. Pt has no complaints and has been walking the dog few days a week with baseline dyspnea/fatigue. No active or new complaints.   He otherwise denies chest pain, SOB, palpitations.  Cardiac Hx   [facility] 5/6/2019 . MACI to OM1   [facility] 5/6/2019  RA 6 RV 39/12 PA 38/14/21 PCWP 9  PA 63 CO 6.36/CI 2.75  SVR 1182   TTE 4/2/2019   Conclusions: 1. Mitral valve not well visualized, probably normal. Minimal mitral regurgitation. 2. Normal trileaflet aortic valve. No aortic valve regurgitation seen. 3. Mild segmental left ventricular systolic dysfunction. The mid inferolateral wall, the basal inferolateral wall, the basal anterolateral wall, and the mid anterolateral wall are hypokinetic. 4. Mild diastolic dysfunction (Stage I). 5. Normal right ventricular size and function. *** No previous Echo exam.   [facility] 4/1/2019   LM: -- Distal left main: Angiography showed minor luminal irregularities with no flow limiting lesions. LAD: -- Proximal LAD: There was a diffuse 100 % stenosis. This is a likely culprit for the patient's clinical presentation. -- D1: There was a tubular 90 % stenosis. -- D2: There was a discrete 90 % stenosis at the ostium of the vessel segment. CX: -- Proximal circumflex: There was a discrete 80 % stenosis. -- OM1: Angiography showed minor luminal irregularities with no flow limiting lesions. -- OM2: Angiography showed minor luminal irregularities with no flow limiting lesions. RCA: -- Proximal RCA: Angiography showed minor luminal irregularities with no flow limiting lesions. -- Mid RCA: Angiography showed mild atherosclerosis with no flow limiting lesions. -- RPLS: Angiography showed minor luminal irregularities with no flow limiting lesions. COMPLICATIONS: There were no complications. DIAGNOSTIC IMPRESSIONS: Severe CAD. Occluded LAD. Severe diag and prox LCx disesae. No LV gram due to severe CKD. INTERVENTIONAL RECOMMENDATIONS: Aspirin and Brilinta     HDL 42 LDL 71 A1C 5.8

## 2021-11-10 NOTE — CARDIOLOGY SUMMARY
[de-identified] : NSR, old anteior infarct [de-identified] : \par TTE 4/2/2019 \par  Conclusions:\par 1. Mitral valve not well visualized, probably normal.\par Minimal mitral regurgitation.\par 2. Normal trileaflet aortic valve. No aortic valve\par regurgitation seen.\par 3. Mild segmental left ventricular systolic dysfunction.\par The mid inferolateral wall, the basal inferolateral wall,\par the basal anterolateral wall, and the mid anterolateral\par wall are hypokinetic.\par 4. Mild diastolic dysfunction (Stage I).\par 5. Normal right ventricular size and function.\par *** No previous Echo exam. \par  [de-identified] : [facility] 5/6/2019 . MACI to OM1 \par \par [facility] 5/6/2019\par \par [facility] 4/1/2019 \par \par LM: -- Distal left main: Angiography showed minor luminal irregularities\par with no flow limiting lesions.\par LAD: -- Proximal LAD: There was a diffuse 100 % stenosis. This is a\par likely culprit for the patient's clinical presentation.\par -- D1: There was a tubular 90 % stenosis.\par -- D2: There was a discrete 90 % stenosis at the ostium of the vessel\par segment.\par CX: -- Proximal circumflex: There was a discrete 80 % stenosis.\par -- OM1: Angiography showed minor luminal irregularities with no flow\par limiting lesions.\par -- OM2: Angiography showed minor luminal irregularities with no flow\par limiting lesions.\par RCA: -- Proximal RCA: Angiography showed minor luminal irregularities\par with no flow limiting lesions.\par -- Mid RCA: Angiography showed mild atherosclerosis with no flow limiting\par lesions.\par -- RPLS: Angiography showed minor luminal irregularities with no flow\par limiting lesions.\par COMPLICATIONS: There were no complications.\par DIAGNOSTIC IMPRESSIONS: Severe CAD. Occluded LAD. Severe diag and prox LCx\par disesae. No LV gram due to severe CKD.\par INTERVENTIONAL RECOMMENDATIONS: Aspirin and Brilinta \par \par RA 6\par RV 39/12\par PA 38/14/21\par PCWP 9 \par PA 63\par CO 6.36/CI 2.75 \par SVR 1182

## 2021-11-10 NOTE — PHYSICAL EXAM

## 2021-11-10 NOTE — ASSESSMENT
[FreeTextEntry1] : 80M hx of CAD (s/p PCI 2000, NSTEMI 4/2019 s/p PCI x 2 to LAD and staged PCI to OM1 in 5/2019), ICM (EF 45-50%, normal RV), CKD stage IV (B/L Scr 3-4.2), DM2 (A1c 6.3 on 4/2019), HTN, who presents for follow up.\par \par #CAD\par -dz as above\par -no new cardiac symptoms\par -LE edema as below\par -c/w Coreg 6.25mg PO BID, Imdur 30mg PO daily, atorvastatin 10mg PO daily, ASA 81mg daily, lisinopril 10mg PO daily\par \par #HTN\par -well controlled\par -c/w amlodipine 5mg and meds above \par \par #LE edema\par -chronic but improving per daughter\par -suspect 2/2 progressing CKD / known mild HFrEF\par -Lasix 40mg BID but instructed to increase 80mg daily as needed for worsening edema\par -renal recs appreciated; likely a poor candidate dialysis, will c/w ongoing diuretic tx\par -RTC 1-2mos\par \par Vaibhav Rosado MD \par

## 2021-11-10 NOTE — HISTORY OF PRESENT ILLNESS
[FreeTextEntry1] : 80M hx of CAD (s/p PCI 2000, NSTEMI 4/2019 s/p PCI x 2 to LAD and staged PCI to OM1 in 5/2019), ICM (EF 45-50%, normal RV), CKD stage IV (B/L Scr 3-4.2), DM2 (A1c 6.3 on 4/2019), HTN, who presents for follow up. Patient accompanied by his daughter.  Pt himself has no complaints and feels well. Per daughter, LE edema has improved with increase in Lasix which she increases PRN when she notices worsening LE edema from 40mg PO daily to BID.  No other issues.  Mainly sedentary but does walk the dog occasionally every week with baseline dyspnea/fatigue.  \par \par He otherwise denies chest pain, SOB, palpitations.\par \par \par \par

## 2021-11-18 RX ORDER — ASPIRIN ENTERIC COATED TABLETS 81 MG 81 MG/1
81 TABLET, DELAYED RELEASE ORAL
Qty: 90 | Refills: 3 | Status: ACTIVE | COMMUNITY
Start: 2020-10-13 | End: 1900-01-01

## 2021-11-18 RX ORDER — CARVEDILOL 12.5 MG/1
12.5 TABLET, FILM COATED ORAL TWICE DAILY
Qty: 180 | Refills: 3 | Status: ACTIVE | COMMUNITY
Start: 2019-04-24 | End: 1900-01-01

## 2022-01-18 NOTE — DISCHARGE NOTE NURSING/CASE MANAGEMENT/SOCIAL WORK - INFLUENZA IMMUNIZATION DATE (APPROXIMATE):
COVID-19 PCR test completed. Patient handout For Patients Who Have Been Tested for Covid-19 (Coronavirus) was given to the patient, which includes test result notification process.    
01-Jan-2019

## 2022-02-16 ENCOUNTER — NON-APPOINTMENT (OUTPATIENT)
Age: 82
End: 2022-02-16

## 2022-02-16 ENCOUNTER — APPOINTMENT (OUTPATIENT)
Dept: CARDIOLOGY | Facility: HOSPITAL | Age: 82
End: 2022-02-16

## 2022-02-16 ENCOUNTER — OUTPATIENT (OUTPATIENT)
Dept: OUTPATIENT SERVICES | Facility: HOSPITAL | Age: 82
LOS: 1 days | End: 2022-02-16
Payer: MEDICARE

## 2022-02-16 VITALS
OXYGEN SATURATION: 97 % | DIASTOLIC BLOOD PRESSURE: 74 MMHG | HEART RATE: 57 BPM | SYSTOLIC BLOOD PRESSURE: 133 MMHG | HEIGHT: 68 IN

## 2022-02-16 DIAGNOSIS — E11.9 TYPE 2 DIABETES MELLITUS W/OUT COMPLICATIONS: ICD-10-CM

## 2022-02-16 DIAGNOSIS — Z98.890 OTHER SPECIFIED POSTPROCEDURAL STATES: Chronic | ICD-10-CM

## 2022-02-16 DIAGNOSIS — I21.4 NON-ST ELEVATION (NSTEMI) MYOCARDIAL INFARCTION: ICD-10-CM

## 2022-02-16 DIAGNOSIS — R60.0 LOCALIZED EDEMA: ICD-10-CM

## 2022-02-16 DIAGNOSIS — I25.10 ATHEROSCLEROTIC HEART DISEASE OF NATIVE CORONARY ARTERY WITHOUT ANGINA PECTORIS: ICD-10-CM

## 2022-02-16 PROCEDURE — G0463: CPT

## 2022-02-16 PROCEDURE — 93005 ELECTROCARDIOGRAM TRACING: CPT

## 2022-02-16 RX ORDER — FUROSEMIDE 40 MG/1
40 TABLET ORAL TWICE DAILY
Qty: 180 | Refills: 1 | Status: ACTIVE | COMMUNITY
Start: 1900-01-01 | End: 1900-01-01

## 2022-02-17 DIAGNOSIS — R60.0 LOCALIZED EDEMA: ICD-10-CM

## 2022-02-17 DIAGNOSIS — I21.4 NON-ST ELEVATION (NSTEMI) MYOCARDIAL INFARCTION: ICD-10-CM

## 2022-02-17 DIAGNOSIS — I10 ESSENTIAL (PRIMARY) HYPERTENSION: ICD-10-CM

## 2022-02-17 DIAGNOSIS — E11.9 TYPE 2 DIABETES MELLITUS WITHOUT COMPLICATIONS: ICD-10-CM

## 2022-02-17 NOTE — HISTORY OF PRESENT ILLNESS
[FreeTextEntry1] : 81M hx of CAD (s/p PCI 2000, NSTEMI 4/2019 s/p PCI x 2 to LAD and staged PCI to OM1 in 5/2019), ICM (EF 45-50%, normal RV), CKD stage IV (B/L Scr 3-4.2), DM2, HTN, who presents for follow up. Patient accompanied by his daughter. Pt himself c/o lower back pains at time that limits his ambulation.  Denies chest pain, palpitations or dyspnea.  Per daughter, increasing Lasix 40mg PO BID seems to be effective at reducing LE edema.  He is still mainly sedentary.  They are arranging to move to [place] in the next 6-12 months.

## 2022-02-17 NOTE — ASSESSMENT
[FreeTextEntry1] : 81M hx of CAD (s/p PCI 2000, NSTEMI 4/2019 s/p PCI x 2 to LAD and staged PCI to OM1 in 5/2019), ICM (EF 50%, normal RV), CKD stage IV (B/L Scr 3-4.2), DM2, HTN, who presents for follow up.\par \par #ischemic cardiomyopathy\par -dz as above\par -no new cardiac symptoms\par -improvement in chronic LE edema in setting of mild HFrEF / CKD\par -c/w Coreg 12.5mg PO BID, imdur 30mg PO daily, atorvastatin 10mg PO daily, ASA 81mg daily, lisinopril 10mg PO daily\par -c/w Lasix 40mg PO BID; advised more effective dose is likely 80mg PO BID and to increase if LE edema worsens\par -renal recs appreciated; likely a poor candidate dialysis\par -will repeat TTE for surveillance / assist in med optimization given fluctuation of LE edema\par \par #HTN\par -well controlled\par -c/w amlodipine 5mg, hydralazine 100mg TID, and meds above \par \par Vaibhav Rosado MD \par

## 2022-02-17 NOTE — CARDIOLOGY SUMMARY
[de-identified] : NSR, old anterior infarct [de-identified] : 2/12/20 \par EF 50%, akinesis of inferolateral wall and possible basal anterolateral wall, impaired LV relaxation with normal filling pressures

## 2022-02-17 NOTE — PHYSICAL EXAM

## 2022-02-18 ENCOUNTER — APPOINTMENT (OUTPATIENT)
Dept: NEPHROLOGY | Facility: CLINIC | Age: 82
End: 2022-02-18
Payer: MEDICARE

## 2022-02-18 VITALS
HEART RATE: 106 BPM | WEIGHT: 262.35 LBS | DIASTOLIC BLOOD PRESSURE: 84 MMHG | BODY MASS INDEX: 39.76 KG/M2 | HEIGHT: 68 IN | TEMPERATURE: 97.3 F | SYSTOLIC BLOOD PRESSURE: 129 MMHG | OXYGEN SATURATION: 96 %

## 2022-02-18 LAB
25(OH)D3 SERPL-MCNC: 37.6 NG/ML
ALBUMIN SERPL ELPH-MCNC: 4 G/DL
ANION GAP SERPL CALC-SCNC: 16 MMOL/L
APPEARANCE: CLEAR
BACTERIA: NEGATIVE
BASOPHILS # BLD AUTO: 0.04 K/UL
BASOPHILS NFR BLD AUTO: 0.5 %
BILIRUBIN URINE: NEGATIVE
BLOOD URINE: NEGATIVE
BUN SERPL-MCNC: 58 MG/DL
CALCIUM SERPL-MCNC: 9 MG/DL
CALCIUM SERPL-MCNC: 9 MG/DL
CHLORIDE SERPL-SCNC: 104 MMOL/L
CO2 SERPL-SCNC: 21 MMOL/L
COLOR: YELLOW
CREAT SERPL-MCNC: 3.97 MG/DL
CREAT SPEC-SCNC: 97 MG/DL
CREAT/PROT UR: 1.6 RATIO
EOSINOPHIL # BLD AUTO: 0.23 K/UL
EOSINOPHIL NFR BLD AUTO: 2.8 %
FERRITIN SERPL-MCNC: 94 NG/ML
GLUCOSE QUALITATIVE U: NEGATIVE
GLUCOSE SERPL-MCNC: 169 MG/DL
HCT VFR BLD CALC: 34.6 %
HGB BLD-MCNC: 10.8 G/DL
HYALINE CASTS: 2 /LPF
IMM GRANULOCYTES NFR BLD AUTO: 0.4 %
IRON SATN MFR SERPL: 35 %
IRON SERPL-MCNC: 82 UG/DL
KETONES URINE: NEGATIVE
LEUKOCYTE ESTERASE URINE: NEGATIVE
LYMPHOCYTES # BLD AUTO: 1.8 K/UL
LYMPHOCYTES NFR BLD AUTO: 21.7 %
MAN DIFF?: NORMAL
MCHC RBC-ENTMCNC: 31.2 GM/DL
MCHC RBC-ENTMCNC: 31.4 PG
MCV RBC AUTO: 100.6 FL
MICROSCOPIC-UA: NORMAL
MONOCYTES # BLD AUTO: 0.61 K/UL
MONOCYTES NFR BLD AUTO: 7.4 %
NEUTROPHILS # BLD AUTO: 5.57 K/UL
NEUTROPHILS NFR BLD AUTO: 67.2 %
NITRITE URINE: NEGATIVE
PARATHYROID HORMONE INTACT: 303 PG/ML
PH URINE: 6
PHOSPHATE SERPL-MCNC: 3.8 MG/DL
PLATELET # BLD AUTO: 152 K/UL
POTASSIUM SERPL-SCNC: 4.3 MMOL/L
PROT UR-MCNC: 156 MG/DL
PROTEIN URINE: ABNORMAL
RBC # BLD: 3.44 M/UL
RBC # FLD: 14.4 %
RED BLOOD CELLS URINE: 0 /HPF
SODIUM SERPL-SCNC: 141 MMOL/L
SPECIFIC GRAVITY URINE: 1.01
SQUAMOUS EPITHELIAL CELLS: 1 /HPF
TIBC SERPL-MCNC: 233 UG/DL
UIBC SERPL-MCNC: 152 UG/DL
UROBILINOGEN URINE: NORMAL
WBC # FLD AUTO: 8.28 K/UL
WHITE BLOOD CELLS URINE: 1 /HPF

## 2022-02-18 PROCEDURE — 99215 OFFICE O/P EST HI 40 MIN: CPT

## 2022-02-18 NOTE — ASSESSMENT
[FreeTextEntry1] : 82 yo male with ckd 5 likely sec to diabetic nephropathy, HTN, CAD with stents x2 (2000), hypothyroid, depression, transferred from [facility] for NSTEMI and hypertensive urgency s/p CCU stay requiring on nitro gtt s/p PCI with MACI x 2 to pLAD 4/1 with ALDEN, now progressive CKD \par \par CKD 5- Likely sec to diabetic nephropathy. serological work up negative. ALDEN likely sec to malignant hypertension and worsened by RAMIRO during cardiac cath. his creatinine has been in the 4s with the last eGFR of 12 ml/min. he has no uremic s/s. I have discussed dialysis with him in the past and he has been reluctant considering age and co-morbidities. I have also discussed with her daughter who does not think dialysis would be a good option for him and I agree. Check labs today.\par \par metabolic acidosis- check labs today \par \par anemia- check CBC \par \par \par

## 2022-02-18 NOTE — PHYSICAL EXAM
[General Appearance - Alert] : alert [General Appearance - In No Acute Distress] : in no acute distress [General Appearance - Well Nourished] : well nourished [General Appearance - Well Developed] : well developed [Sclera] : the sclera and conjunctiva were normal [PERRL With Normal Accommodation] : pupils were equal in size, round, and reactive to light [Outer Ear] : the ears and nose were normal in appearance [Hearing Threshold Finger Rub Not [place]] : hearing was normal [Examination Of The Oral Cavity] : the lips and gums were normal [Neck Appearance] : the appearance of the neck was normal [Neck Cervical Mass (___cm)] : no neck mass was observed [Jugular Venous Distention Increased] : there was no jugular-venous distention [Respiration, Rhythm And Depth] : normal respiratory rhythm and effort [Exaggerated Use Of Accessory Muscles For Inspiration] : no accessory muscle use [Auscultation Breath Sounds / Voice Sounds] : lungs were clear to auscultation bilaterally [Heart Rate And Rhythm] : heart rate was normal and rhythm regular [Heart Sounds] : normal S1 and S2 [Heart Sounds Gallop] : no gallops [Arterial Pulses Carotid] : carotid pulses were normal with no bruits [Edema] : there was no peripheral edema [Bowel Sounds] : normal bowel sounds [Abdomen Soft] : soft [Abdomen Tenderness] : non-tender [No CVA Tenderness] : no ~M costovertebral angle tenderness [No Spinal Tenderness] : no spinal tenderness [Abnormal Walk] : normal gait [Nail Clubbing] : no clubbing  or cyanosis of the fingernails [Musculoskeletal - Swelling] : no joint swelling seen [Motor Tone] : muscle strength and tone were normal [Skin Color & Pigmentation] : normal skin color and pigmentation [Skin Turgor] : normal skin turgor [] : no rash [Skin Lesions] : no skin lesions [Cranial Nerves] : cranial nerves 2-12 were intact [Deep Tendon Reflexes (DTR)] : deep tendon reflexes were 2+ and symmetric [Sensation] : the sensory exam was normal to light touch and pinprick [Motor Exam] : the motor exam was normal [Oriented To Time, Place, And Person] : oriented to person, place, and time [Impaired Insight] : insight and judgment were intact [Affect] : the affect was normal

## 2022-02-18 NOTE — HISTORY OF PRESENT ILLNESS
[FreeTextEntry1] : follow up ckd 4/5 \par \par 82 yo [place] speaking male with PMH HTN, DMT2, CAD with stents x2 (2000), hypothyroid, depression, transferred from [facility] for NSTEMI and hypertensive urgency s/p CCU stay requiring on nitro gtt s/p PCI with MACI x 2 to [facility] 4/1. Baseline creat in sep 2018 was 2.0. admitted with a creat of 3.0 likely in the setting of malignant hypertension which got worse due to RAMIRO. Discharged with a creat of 3.4 on 4/8/2019 and has beeni in the 3s range. [facility] 5/2019\par \par last labs done by his PCP on 5/4/21- showed a creat of 3.8 mg/dl/ bun - 63\par Na- 141, K- 4.7, CO2-24, Calcium- 8.8,Hb- 10.8 \par \par his last visit with me was in Nov 2021 \par \par since that time, he has overall done well. His leg swelling is improved. Overall per his daughter, he's " getting old" and feels a bit more tired lately \par \par ros \par gu- no changes in urination, no blood in urine, no foamy urine \par cvs- no chest pain, no shortness of breath \par all other systems reviewed in detail and were negative except as above

## 2022-05-16 ENCOUNTER — INPATIENT (INPATIENT)
Facility: HOSPITAL | Age: 82
LOS: 3 days | Discharge: HOME | DRG: 682 | End: 2022-05-20
Attending: STUDENT IN AN ORGANIZED HEALTH CARE EDUCATION/TRAINING PROGRAM | Admitting: HOSPITALIST
Payer: MEDICARE

## 2022-05-16 VITALS
OXYGEN SATURATION: 97 % | DIASTOLIC BLOOD PRESSURE: 81 MMHG | RESPIRATION RATE: 18 BRPM | HEIGHT: 71 IN | TEMPERATURE: 98 F | WEIGHT: 250 LBS | SYSTOLIC BLOOD PRESSURE: 140 MMHG | HEART RATE: 64 BPM

## 2022-05-16 DIAGNOSIS — Z98.890 OTHER SPECIFIED POSTPROCEDURAL STATES: Chronic | ICD-10-CM

## 2022-05-16 DIAGNOSIS — R06.02 SHORTNESS OF BREATH: ICD-10-CM

## 2022-05-16 LAB
ALBUMIN SERPL ELPH-MCNC: 4 G/DL — SIGNIFICANT CHANGE UP (ref 3.3–5)
ALP SERPL-CCNC: 125 U/L — HIGH (ref 40–120)
ALT FLD-CCNC: 15 U/L — SIGNIFICANT CHANGE UP (ref 10–45)
ANION GAP SERPL CALC-SCNC: 15 MMOL/L — SIGNIFICANT CHANGE UP (ref 5–17)
APPEARANCE UR: CLEAR — SIGNIFICANT CHANGE UP
AST SERPL-CCNC: 20 U/L — SIGNIFICANT CHANGE UP (ref 10–40)
BACTERIA # UR AUTO: NEGATIVE — SIGNIFICANT CHANGE UP
BASE EXCESS BLDV CALC-SCNC: -2.6 MMOL/L — LOW (ref -2–2)
BASE EXCESS BLDV CALC-SCNC: -3.5 MMOL/L — LOW (ref -2–2)
BASOPHILS # BLD AUTO: 0.04 K/UL — SIGNIFICANT CHANGE UP (ref 0–0.2)
BASOPHILS NFR BLD AUTO: 0.4 % — SIGNIFICANT CHANGE UP (ref 0–2)
BILIRUB SERPL-MCNC: 0.2 MG/DL — SIGNIFICANT CHANGE UP (ref 0.2–1.2)
BILIRUB UR-MCNC: NEGATIVE — SIGNIFICANT CHANGE UP
BUN SERPL-MCNC: 80 MG/DL — HIGH (ref 7–23)
CA-I SERPL-SCNC: 1.25 MMOL/L — SIGNIFICANT CHANGE UP (ref 1.15–1.33)
CA-I SERPL-SCNC: 1.25 MMOL/L — SIGNIFICANT CHANGE UP (ref 1.15–1.33)
CALCIUM SERPL-MCNC: 9.7 MG/DL — SIGNIFICANT CHANGE UP (ref 8.4–10.5)
CHLORIDE BLDV-SCNC: 103 MMOL/L — SIGNIFICANT CHANGE UP (ref 96–108)
CHLORIDE BLDV-SCNC: 103 MMOL/L — SIGNIFICANT CHANGE UP (ref 96–108)
CHLORIDE SERPL-SCNC: 104 MMOL/L — SIGNIFICANT CHANGE UP (ref 96–108)
CK MB BLD-MCNC: 3.2 % — SIGNIFICANT CHANGE UP (ref 0–3.5)
CK MB CFR SERPL CALC: 3.8 NG/ML — SIGNIFICANT CHANGE UP (ref 0–6.7)
CK SERPL-CCNC: 118 U/L — SIGNIFICANT CHANGE UP (ref 30–200)
CO2 BLDV-SCNC: 23 MMOL/L — SIGNIFICANT CHANGE UP (ref 22–26)
CO2 BLDV-SCNC: 25 MMOL/L — SIGNIFICANT CHANGE UP (ref 22–26)
CO2 SERPL-SCNC: 21 MMOL/L — LOW (ref 22–31)
COLOR SPEC: SIGNIFICANT CHANGE UP
CREAT SERPL-MCNC: 4.38 MG/DL — HIGH (ref 0.5–1.3)
DIFF PNL FLD: NEGATIVE — SIGNIFICANT CHANGE UP
EGFR: 13 ML/MIN/1.73M2 — LOW
EOSINOPHIL # BLD AUTO: 0.25 K/UL — SIGNIFICANT CHANGE UP (ref 0–0.5)
EOSINOPHIL NFR BLD AUTO: 2.6 % — SIGNIFICANT CHANGE UP (ref 0–6)
EPI CELLS # UR: 0 /HPF — SIGNIFICANT CHANGE UP
FLUAV AG NPH QL: SIGNIFICANT CHANGE UP
FLUBV AG NPH QL: SIGNIFICANT CHANGE UP
GAS PNL BLDV: 135 MMOL/L — LOW (ref 136–145)
GAS PNL BLDV: 137 MMOL/L — SIGNIFICANT CHANGE UP (ref 136–145)
GAS PNL BLDV: SIGNIFICANT CHANGE UP
GAS PNL BLDV: SIGNIFICANT CHANGE UP
GLUCOSE BLDV-MCNC: 105 MG/DL — HIGH (ref 70–99)
GLUCOSE BLDV-MCNC: 108 MG/DL — HIGH (ref 70–99)
GLUCOSE SERPL-MCNC: 109 MG/DL — HIGH (ref 70–99)
GLUCOSE UR QL: NEGATIVE — SIGNIFICANT CHANGE UP
HCO3 BLDV-SCNC: 22 MMOL/L — SIGNIFICANT CHANGE UP (ref 22–29)
HCO3 BLDV-SCNC: 24 MMOL/L — SIGNIFICANT CHANGE UP (ref 22–29)
HCT VFR BLD CALC: 35.6 % — LOW (ref 39–50)
HCT VFR BLDA CALC: 40 % — SIGNIFICANT CHANGE UP (ref 39–51)
HCT VFR BLDA CALC: 41 % — SIGNIFICANT CHANGE UP (ref 39–51)
HGB BLD CALC-MCNC: 13.3 G/DL — SIGNIFICANT CHANGE UP (ref 12.6–17.4)
HGB BLD CALC-MCNC: 13.8 G/DL — SIGNIFICANT CHANGE UP (ref 12.6–17.4)
HGB BLD-MCNC: 11.3 G/DL — LOW (ref 13–17)
HYALINE CASTS # UR AUTO: 0 /LPF — SIGNIFICANT CHANGE UP (ref 0–2)
IMM GRANULOCYTES NFR BLD AUTO: 0.4 % — SIGNIFICANT CHANGE UP (ref 0–1.5)
KETONES UR-MCNC: NEGATIVE — SIGNIFICANT CHANGE UP
LACTATE BLDV-MCNC: 1 MMOL/L — SIGNIFICANT CHANGE UP (ref 0.7–2)
LACTATE BLDV-MCNC: 1 MMOL/L — SIGNIFICANT CHANGE UP (ref 0.7–2)
LEUKOCYTE ESTERASE UR-ACNC: NEGATIVE — SIGNIFICANT CHANGE UP
LYMPHOCYTES # BLD AUTO: 2.37 K/UL — SIGNIFICANT CHANGE UP (ref 1–3.3)
LYMPHOCYTES # BLD AUTO: 24.2 % — SIGNIFICANT CHANGE UP (ref 13–44)
MAGNESIUM SERPL-MCNC: 2.2 MG/DL — SIGNIFICANT CHANGE UP (ref 1.6–2.6)
MCHC RBC-ENTMCNC: 31.5 PG — SIGNIFICANT CHANGE UP (ref 27–34)
MCHC RBC-ENTMCNC: 31.7 GM/DL — LOW (ref 32–36)
MCV RBC AUTO: 99.2 FL — SIGNIFICANT CHANGE UP (ref 80–100)
MONOCYTES # BLD AUTO: 0.74 K/UL — SIGNIFICANT CHANGE UP (ref 0–0.9)
MONOCYTES NFR BLD AUTO: 7.6 % — SIGNIFICANT CHANGE UP (ref 2–14)
NEUTROPHILS # BLD AUTO: 6.35 K/UL — SIGNIFICANT CHANGE UP (ref 1.8–7.4)
NEUTROPHILS NFR BLD AUTO: 64.8 % — SIGNIFICANT CHANGE UP (ref 43–77)
NITRITE UR-MCNC: NEGATIVE — SIGNIFICANT CHANGE UP
NRBC # BLD: 0 /100 WBCS — SIGNIFICANT CHANGE UP (ref 0–0)
NT-PROBNP SERPL-SCNC: 1441 PG/ML — HIGH (ref 0–300)
PCO2 BLDV: 41 MMHG — LOW (ref 42–55)
PCO2 BLDV: 46 MMHG — SIGNIFICANT CHANGE UP (ref 42–55)
PH BLDV: 7.32 — SIGNIFICANT CHANGE UP (ref 7.32–7.43)
PH BLDV: 7.34 — SIGNIFICANT CHANGE UP (ref 7.32–7.43)
PH UR: 5.5 — SIGNIFICANT CHANGE UP (ref 5–8)
PHOSPHATE SERPL-MCNC: 4 MG/DL — SIGNIFICANT CHANGE UP (ref 2.5–4.5)
PLATELET # BLD AUTO: 175 K/UL — SIGNIFICANT CHANGE UP (ref 150–400)
PO2 BLDV: 31 MMHG — SIGNIFICANT CHANGE UP (ref 25–45)
PO2 BLDV: 52 MMHG — HIGH (ref 25–45)
POTASSIUM BLDV-SCNC: 4.3 MMOL/L — SIGNIFICANT CHANGE UP (ref 3.5–5.1)
POTASSIUM BLDV-SCNC: 4.4 MMOL/L — SIGNIFICANT CHANGE UP (ref 3.5–5.1)
POTASSIUM SERPL-MCNC: 4.5 MMOL/L — SIGNIFICANT CHANGE UP (ref 3.5–5.3)
POTASSIUM SERPL-SCNC: 4.5 MMOL/L — SIGNIFICANT CHANGE UP (ref 3.5–5.3)
PROT SERPL-MCNC: 7.6 G/DL — SIGNIFICANT CHANGE UP (ref 6–8.3)
PROT UR-MCNC: 100 — SIGNIFICANT CHANGE UP
RBC # BLD: 3.59 M/UL — LOW (ref 4.2–5.8)
RBC # FLD: 13.1 % — SIGNIFICANT CHANGE UP (ref 10.3–14.5)
RBC CASTS # UR COMP ASSIST: 0 /HPF — SIGNIFICANT CHANGE UP (ref 0–4)
RSV RNA NPH QL NAA+NON-PROBE: SIGNIFICANT CHANGE UP
SAO2 % BLDV: 49.5 % — LOW (ref 67–88)
SAO2 % BLDV: 82.7 % — SIGNIFICANT CHANGE UP (ref 67–88)
SARS-COV-2 RNA SPEC QL NAA+PROBE: SIGNIFICANT CHANGE UP
SODIUM SERPL-SCNC: 140 MMOL/L — SIGNIFICANT CHANGE UP (ref 135–145)
SP GR SPEC: 1.01 — SIGNIFICANT CHANGE UP (ref 1.01–1.02)
TROPONIN T, HIGH SENSITIVITY RESULT: 62 NG/L — HIGH (ref 0–51)
TROPONIN T, HIGH SENSITIVITY RESULT: 65 NG/L — HIGH (ref 0–51)
UROBILINOGEN FLD QL: NEGATIVE — SIGNIFICANT CHANGE UP
WBC # BLD: 9.79 K/UL — SIGNIFICANT CHANGE UP (ref 3.8–10.5)
WBC # FLD AUTO: 9.79 K/UL — SIGNIFICANT CHANGE UP (ref 3.8–10.5)
WBC UR QL: 0 /HPF — SIGNIFICANT CHANGE UP (ref 0–5)

## 2022-05-16 PROCEDURE — 99285 EMERGENCY DEPT VISIT HI MDM: CPT | Mod: CS

## 2022-05-16 PROCEDURE — 71045 X-RAY EXAM CHEST 1 VIEW: CPT | Mod: 26

## 2022-05-16 RX ORDER — FUROSEMIDE 40 MG
40 TABLET ORAL ONCE
Refills: 0 | Status: COMPLETED | OUTPATIENT
Start: 2022-05-16 | End: 2022-05-16

## 2022-05-16 RX ORDER — ASPIRIN/CALCIUM CARB/MAGNESIUM 324 MG
324 TABLET ORAL ONCE
Refills: 0 | Status: COMPLETED | OUTPATIENT
Start: 2022-05-16 | End: 2022-05-16

## 2022-05-16 RX ADMIN — Medication 40 MILLIGRAM(S): at 22:25

## 2022-05-16 RX ADMIN — Medication 324 MILLIGRAM(S): at 22:26

## 2022-05-16 NOTE — ED ADULT NURSE NOTE - OBJECTIVE STATEMENT
pt is an 82yo male PMH HTN, HDL, DM, presenting to the ED complaining of chest pain. pt wife at bedside translating, pt refused  ipad, preferred wife to translate. pt wife states pt pain started 2 weeks ago but worsened today, prompting visit to ED. pt wife states that chest pain is intermittent, feels heavy and tight when it presents. pt wife states that pain radiates to the base of the neck near the trapezius muscle bilaterally. pt wife states pt has a dry cough for the past few weeks and describes some bilateral flank pain. pt A&Ox3 gross neuro intact, no difficulty speaking in complete sentences, s1s2 heart sounds heard, pulses x 4, naqvi x4, abdomen soft nontender nondistended, skin intact. pt denies ha, n/v/d, abdominal pain, f/c, urinary symptoms, hematuria. pt is an 80yo male PMH HTN, HDL, DM, presenting to the ED complaining of chest pain. pt wife at bedside translating, pt refused  ipad, preferred wife to translate. pt wife states pt pain started 2 weeks ago but worsened today, prompting visit to ED. pt wife states that chest pain is intermittent, feels heavy and tight when it presents. pt wife states that pain radiates to the base of the neck near the trapezius muscle bilaterally. pt wife states pt has a dry cough for the past few weeks and describes some bilateral flank pain. pt A&Ox3 gross neuro intact, no difficulty speaking in complete sentences, s1s2 heart sounds heard, pulses x 4, naqvi x4, abdomen soft nontender nondistended, skin intact. pt denies ha, n/v/d, abdominal pain, f/c, urinary symptoms, hematuria. pt bladder scanned by MD Hopkins, 230mL urine noted.

## 2022-05-16 NOTE — ED PROVIDER NOTE - NS ED ROS FT
ROS:  CONSTITUTIONAL: No fever, chills, generalized weakness or fatigue.  EYES: No eye pain, visual disturbances, or discharge.  ENMT:  No difficulty hearing, tinnitus, vertigo; No sinus or throat pain.  RESPIRATORY: No cough, wheezing, chills or hemoptysis; + shortness of breath on exertion  CARDIOVASCULAR: + chest heaviness. No chest pain, palpitations, dizziness. + leg swelling.  GASTROINTESTINAL: No abdominal or epigastric pain. No nausea, vomiting, or hematemesis; No diarrhea or constipation. No melena or hematochezia.  GENITOURINARY: + decreased UOP. No dysuria, frequency, hematuria, or incontinence.  NEUROLOGICAL: No headaches, memory loss, loss of strength, numbness, or tremors.  ENDOCRINE: No heat or cold intolerance; No hair loss.  MUSCULOSKELETAL: No joint pain or swelling; No muscle, back, or extremity pain.  HEME/LYMPH: No easy bruising, or bleeding gums.  SKIN: No rash or itchiness.

## 2022-05-16 NOTE — ED PROVIDER NOTE - PHYSICAL EXAMINATION
Attending Valarie Calix: Gen: NAD, heent: atrauamtic, eomi, perrla, mmm, op pink, uvula midline, neck; nttp, no nuchal rigidity, chest: nttp, no crepitus, cv: rrr,, lungs: ctab, abd: soft, nontender, nondistended, no peritoneal signs, , no guarding, ext: wwp, BL pitting edema, skin: no rash, neuro: awake and alert, following commands, speech clear, sensation and strength intact, no focal deficits Attending Valarie Calix: Gen: NAD, heent: atrauamtic, eomi, perrla, mmm, op pink, uvula midline, neck; nttp, no nuchal rigidity, chest: nttp, no crepitus, cv: rrr,, lungs: ctab, abd: soft, nontender, nondistended, no peritoneal signs, , no guarding, ext: wwp, BL pitting edema, skin: no rash, neuro: awake and alert, following commands, speech clear, sensation and strength intact, no focal deficits    Vital Signs Last 24 Hrs  T(F): 97.9, Max: 98.1 (05-16-22 @ 15:21)  HR: 63 (63 - 64)  BP: 147/78 (140/81 - 147/78)  RR: 16 (16 - 18)  SpO2: 97% (97% - 97%)    CONST:     NAD, well-appearing; well-developed  EYES:         Conjunctiva clear; PERRL; no conjunctival pallor; no lid lag  ENMT:       MMM, no pharyngeal injection or exudates; normal dentition  NECK:        Supple, no palpable masses; no thyromegaly; no LAD  RESP :        Normal respiratory effort; CTA bilaterally; no W/R/R  CHEST:       No TTP, no lines/ports; symmetric chest expansion  CARDIO:    RRR, normal S1 and S2, no M/R/G;  VASC:         +JVD/AJR, 2+ peripheral edema, pulses 2+ B/L, Cap refill <2s  ABD:           Soft, NT/ND, norm bowel sounds; no R/G; No HSM  MSK:          No clubbing or cyanosis of digits; no joint swelling or TTP  EXT:           WWP, +reduction in leg hair, R leg with vascular insuff skin changes  PSYCH        A&O to person, place, and time; affect appropriate  NEURO:     Non-focal; no gross sensory deficits; moving all extremities   SKIN:          No rashes; no palpable lesions; Attending Valarie Calix: Gen: NAD, heent: atrauamtic, eomi, perrla, mmm, op pink, uvula midline, neck; nttp, no nuchal rigidity, chest: nttp, no crepitus, cv: rrr,, lungs: ctab, abd: soft, nontender, nondistended, no peritoneal signs, , no guarding, ext: wwp, BL pitting edema, skin: no rash, neuro: awake and alert, following commands, speech clear, sensation and strength intact, no focal deficits    CONST:     NAD, well-appearing; well-developed  EYES:         Conjunctiva clear; PERRL; no conjunctival pallor; no lid lag  ENMT:       MMM, no pharyngeal injection or exudates; normal dentition  NECK:        Supple, no palpable masses; no thyromegaly; no LAD  RESP :        Normal respiratory effort; CTA bilaterally; no W/R/R  CHEST:       No TTP, no lines/ports; symmetric chest expansion  CARDIO:    RRR, normal S1 and S2, no M/R/G;  VASC:         +JVD/AJR, 2+ peripheral edema, pulses 2+ B/L, Cap refill <2s  ABD:           Soft, NT/ND, norm bowel sounds; no R/G; No HSM  MSK:          No clubbing or cyanosis of digits; no joint swelling or TTP  EXT:           WWP, +reduction in leg hair, R leg with vascular insuff skin changes  PSYCH        A&O to person, place, and time; affect appropriate  NEURO:     Non-focal; no gross sensory deficits; moving all extremities   SKIN:          No rashes; no palpable lesions;

## 2022-05-16 NOTE — ED PROVIDER NOTE - RAPID ASSESSMENT
81y M presents to the ED c/o CP/SOB for 1wk worsening today a/w dry cough for 1wk. Pt describes CP as heavy. Pt is COVID vaccinated. Denies fever, N/V/D. Denies sick contact. Pt is well appearing in triage.     Yajaira BORREGO) have documented this rapid assessment note under the dictation of Williams Hyde) which has been reviewed and affirmed to be accurate. Patient was seen as a QPA patient. The patient will be seen and further worked up in the main emergency department and their care will be completed by the main emergency department team along with a thorough physical exam. Receiving team will follow up on labs, analgesia, any clinical imaging, reassess and disposition as clinically indicated, all decisions regarding the progression of care will be made at their discretion. 81y M presents to the ED c/o CP/SOB for 1wk worsening today a/w dry cough for 1wk. Pt describes CP as heavy. Pt is COVID vaccinated. Denies fever, N/V/D. Denies sick contact. Pt is well appearing in triage.     Yajaira BORREGO (Scribe) have documented this rapid assessment note under the dictation of Williams Hyde (MD) which has been reviewed and affirmed to be accurate. Patient was seen as a QPA patient. The patient will be seen and further worked up in the main emergency department and their care will be completed by the main emergency department team along with a thorough physical exam. Receiving team will follow up on labs, analgesia, any clinical imaging, reassess and disposition as clinically indicated, all decisions regarding the progression of care will be made at their discretion.    Patient seen as Triage/Tele/Q-Doc w scribe under my supervision. Full evaluation, to be performed once pt is transferred to Main ED.  Williams Hyde MD, Facep

## 2022-05-16 NOTE — ED PROVIDER NOTE - OBJECTIVE STATEMENT
Attending Valarie Calix: 80 yo male h/o CAD with prior stents, CKD, presenting with chest pain and sob. pt states symntpoms for last week. complaining of pain to middle of chest and associated sob. pain is intermittent. no fevers or chills. also reports a dry cough. no hemoptysis. no fevers and increased congestion. is on lasix 40mg. no recent changes. notices increased weight gain of approximately 10lb over last couple of days and worsening lower ext edema. also reports decreased po. no vomiting or diarrhea. no headaches    pt request his daughter translates 81M PMH CAD (s/p MACI x 2 last in 2019), HFpEF (50% in 2020),, DM2, HTN, HLD, ?CKD3 based on old labs, hypothyroidism presents with 2 weeks of chest heaviness. Reports sensation is different from his past MIs and is more associated with breathing than exertion. He also notes worsening QUIROS, orthopnea, and LE swelling. He reports  no missed doses of home medications. Follows with  Nephfrancine and Dr Rosado Cardio. Despite taking his home lasix dose he reports decreased urine output but is still making urine. Denies fever, chills, chest pain with exertion, SOB at rest, abdominal pain, N/V/D/C, dysuria, hematuria.    Meds: hydralazine 100 bid, lisinopril 10, coreg 12.5 bid, levothy 175mcg, omeprazole 20, norvasc 5, flomax .4, asa 81, lipitor 10, tradjenta 5, lasix 40 qd, isosorbide mono 30.      Attending Valarie Calix: 80 yo male h/o CAD with prior stents, CKD, presenting with chest pain and sob. pt states symntpoms for last week. complaining of pain to middle of chest and associated sob. pain is intermittent. no fevers or chills. also reports a dry cough. no hemoptysis. no fevers and increased congestion. is on lasix 40mg. no recent changes. notices increased weight gain of approximately 10lb over last couple of days and worsening lower ext edema. also reports decreased po. no vomiting or diarrhea. no headaches    pt request his daughter translates

## 2022-05-16 NOTE — ED PROVIDER NOTE - CLINICAL SUMMARY MEDICAL DECISION MAKING FREE TEXT BOX
81M CAD, CHF, CKD DM2, hypothyroid presents with chest heaviness, volume overload, and worsening kidney failure. Nephro consulted rec IV diuresis, urine lytes, muñoz if retaining. Admit to medicine. 81M CAD, CHF, CKD DM2, hypothyroid presents with chest heaviness, volume overload, and worsening kidney failure. Nephro consulted rec IV diuresis, urine lytes, muñoz if retaining. Admit to medicine.  Attending Valarie Calix: 82 yo male with multiple medical issues presenting with chest pain, sob, and worsening LE edema. upon arrival pt hemodynamically stable. pocus with A line prdominant lung fields,preserved ef. pt with lower ext edema,. concern for diastollic heart failure. pt also reports increased weight gain. additionally pt with chest discomfort. ekg without st elevation, concern for ACS. pt given dose of lasix IV. nephrology consulted as renal failure worsening. will order duplexes as well. no pain with inspiration, consider V/Q scan if fails to improve with diuresis

## 2022-05-17 DIAGNOSIS — I10 ESSENTIAL (PRIMARY) HYPERTENSION: ICD-10-CM

## 2022-05-17 DIAGNOSIS — N18.9 CHRONIC KIDNEY DISEASE, UNSPECIFIED: ICD-10-CM

## 2022-05-17 DIAGNOSIS — Z29.9 ENCOUNTER FOR PROPHYLACTIC MEASURES, UNSPECIFIED: ICD-10-CM

## 2022-05-17 DIAGNOSIS — R07.9 CHEST PAIN, UNSPECIFIED: ICD-10-CM

## 2022-05-17 DIAGNOSIS — I50.32 CHRONIC DIASTOLIC (CONGESTIVE) HEART FAILURE: ICD-10-CM

## 2022-05-17 DIAGNOSIS — N18.4 CHRONIC KIDNEY DISEASE, STAGE 4 (SEVERE): ICD-10-CM

## 2022-05-17 DIAGNOSIS — I50.9 HEART FAILURE, UNSPECIFIED: ICD-10-CM

## 2022-05-17 DIAGNOSIS — N40.0 BENIGN PROSTATIC HYPERPLASIA WITHOUT LOWER URINARY TRACT SYMPTOMS: ICD-10-CM

## 2022-05-17 DIAGNOSIS — E11.9 TYPE 2 DIABETES MELLITUS WITHOUT COMPLICATIONS: ICD-10-CM

## 2022-05-17 DIAGNOSIS — M79.89 OTHER SPECIFIED SOFT TISSUE DISORDERS: ICD-10-CM

## 2022-05-17 DIAGNOSIS — E03.9 HYPOTHYROIDISM, UNSPECIFIED: ICD-10-CM

## 2022-05-17 LAB
ANION GAP SERPL CALC-SCNC: 18 MMOL/L — HIGH (ref 5–17)
APTT BLD: 28.7 SEC — SIGNIFICANT CHANGE UP (ref 27.5–35.5)
BUN SERPL-MCNC: 76 MG/DL — HIGH (ref 7–23)
CALCIUM SERPL-MCNC: 9.4 MG/DL — SIGNIFICANT CHANGE UP (ref 8.4–10.5)
CHLORIDE SERPL-SCNC: 104 MMOL/L — SIGNIFICANT CHANGE UP (ref 96–108)
CHLORIDE UR-SCNC: 50 MMOL/L — SIGNIFICANT CHANGE UP
CO2 SERPL-SCNC: 19 MMOL/L — LOW (ref 22–31)
CREAT ?TM UR-MCNC: 69 MG/DL — SIGNIFICANT CHANGE UP
CREAT SERPL-MCNC: 4.27 MG/DL — HIGH (ref 0.5–1.3)
EGFR: 13 ML/MIN/1.73M2 — LOW
GLUCOSE BLDC GLUCOMTR-MCNC: 100 MG/DL — HIGH (ref 70–99)
GLUCOSE BLDC GLUCOMTR-MCNC: 101 MG/DL — HIGH (ref 70–99)
GLUCOSE BLDC GLUCOMTR-MCNC: 103 MG/DL — HIGH (ref 70–99)
GLUCOSE BLDC GLUCOMTR-MCNC: 110 MG/DL — HIGH (ref 70–99)
GLUCOSE BLDC GLUCOMTR-MCNC: 142 MG/DL — HIGH (ref 70–99)
GLUCOSE SERPL-MCNC: 100 MG/DL — HIGH (ref 70–99)
HCT VFR BLD CALC: 32 % — LOW (ref 39–50)
HGB BLD-MCNC: 10.1 G/DL — LOW (ref 13–17)
INR BLD: 1.08 RATIO — SIGNIFICANT CHANGE UP (ref 0.88–1.16)
MAGNESIUM SERPL-MCNC: 2.2 MG/DL — SIGNIFICANT CHANGE UP (ref 1.6–2.6)
MCHC RBC-ENTMCNC: 31.3 PG — SIGNIFICANT CHANGE UP (ref 27–34)
MCHC RBC-ENTMCNC: 31.6 GM/DL — LOW (ref 32–36)
MCV RBC AUTO: 99.1 FL — SIGNIFICANT CHANGE UP (ref 80–100)
NRBC # BLD: 0 /100 WBCS — SIGNIFICANT CHANGE UP (ref 0–0)
OSMOLALITY UR: 367 MOS/KG — SIGNIFICANT CHANGE UP (ref 300–900)
PHOSPHATE SERPL-MCNC: 4 MG/DL — SIGNIFICANT CHANGE UP (ref 2.5–4.5)
PLATELET # BLD AUTO: 156 K/UL — SIGNIFICANT CHANGE UP (ref 150–400)
POTASSIUM SERPL-MCNC: 4 MMOL/L — SIGNIFICANT CHANGE UP (ref 3.5–5.3)
POTASSIUM SERPL-SCNC: 4 MMOL/L — SIGNIFICANT CHANGE UP (ref 3.5–5.3)
POTASSIUM UR-SCNC: 38 MMOL/L — SIGNIFICANT CHANGE UP
PROT ?TM UR-MCNC: 92 MG/DL — HIGH (ref 0–12)
PROT/CREAT UR-RTO: 1.3 RATIO — HIGH (ref 0–0.2)
PROTHROM AB SERPL-ACNC: 12.4 SEC — SIGNIFICANT CHANGE UP (ref 10.5–13.4)
RAPID RVP RESULT: SIGNIFICANT CHANGE UP
RBC # BLD: 3.23 M/UL — LOW (ref 4.2–5.8)
RBC # FLD: 13 % — SIGNIFICANT CHANGE UP (ref 10.3–14.5)
SARS-COV-2 RNA SPEC QL NAA+PROBE: SIGNIFICANT CHANGE UP
SODIUM SERPL-SCNC: 141 MMOL/L — SIGNIFICANT CHANGE UP (ref 135–145)
SODIUM UR-SCNC: 61 MMOL/L — SIGNIFICANT CHANGE UP
TROPONIN T, HIGH SENSITIVITY RESULT: 77 NG/L — HIGH (ref 0–51)
TROPONIN T, HIGH SENSITIVITY RESULT: 83 NG/L — HIGH (ref 0–51)
TSH SERPL-MCNC: 0.16 UIU/ML — LOW (ref 0.27–4.2)
UUN UR-MCNC: 467 MG/DL — SIGNIFICANT CHANGE UP
WBC # BLD: 7.98 K/UL — SIGNIFICANT CHANGE UP (ref 3.8–10.5)
WBC # FLD AUTO: 7.98 K/UL — SIGNIFICANT CHANGE UP (ref 3.8–10.5)

## 2022-05-17 PROCEDURE — 99222 1ST HOSP IP/OBS MODERATE 55: CPT | Mod: GC

## 2022-05-17 PROCEDURE — 99233 SBSQ HOSP IP/OBS HIGH 50: CPT

## 2022-05-17 PROCEDURE — 99223 1ST HOSP IP/OBS HIGH 75: CPT | Mod: GC

## 2022-05-17 PROCEDURE — 76770 US EXAM ABDO BACK WALL COMP: CPT | Mod: 26

## 2022-05-17 RX ORDER — GLUCAGON INJECTION, SOLUTION 0.5 MG/.1ML
1 INJECTION, SOLUTION SUBCUTANEOUS ONCE
Refills: 0 | Status: DISCONTINUED | OUTPATIENT
Start: 2022-05-17 | End: 2022-05-20

## 2022-05-17 RX ORDER — TAMSULOSIN HYDROCHLORIDE 0.4 MG/1
0.4 CAPSULE ORAL AT BEDTIME
Refills: 0 | Status: DISCONTINUED | OUTPATIENT
Start: 2022-05-17 | End: 2022-05-20

## 2022-05-17 RX ORDER — AMLODIPINE BESYLATE 2.5 MG/1
5 TABLET ORAL DAILY
Refills: 0 | Status: DISCONTINUED | OUTPATIENT
Start: 2022-05-17 | End: 2022-05-20

## 2022-05-17 RX ORDER — SODIUM CHLORIDE 9 MG/ML
1000 INJECTION, SOLUTION INTRAVENOUS
Refills: 0 | Status: DISCONTINUED | OUTPATIENT
Start: 2022-05-17 | End: 2022-05-20

## 2022-05-17 RX ORDER — INSULIN LISPRO 100/ML
VIAL (ML) SUBCUTANEOUS
Refills: 0 | Status: DISCONTINUED | OUTPATIENT
Start: 2022-05-17 | End: 2022-05-20

## 2022-05-17 RX ORDER — LISINOPRIL 2.5 MG/1
1 TABLET ORAL
Qty: 0 | Refills: 0 | DISCHARGE

## 2022-05-17 RX ORDER — TAMSULOSIN HYDROCHLORIDE 0.4 MG/1
1 CAPSULE ORAL
Qty: 0 | Refills: 0 | DISCHARGE

## 2022-05-17 RX ORDER — ATORVASTATIN CALCIUM 80 MG/1
10 TABLET, FILM COATED ORAL AT BEDTIME
Refills: 0 | Status: DISCONTINUED | OUTPATIENT
Start: 2022-05-17 | End: 2022-05-20

## 2022-05-17 RX ORDER — INSULIN LISPRO 100/ML
VIAL (ML) SUBCUTANEOUS AT BEDTIME
Refills: 0 | Status: DISCONTINUED | OUTPATIENT
Start: 2022-05-17 | End: 2022-05-20

## 2022-05-17 RX ORDER — LEVOTHYROXINE SODIUM 125 MCG
175 TABLET ORAL DAILY
Refills: 0 | Status: DISCONTINUED | OUTPATIENT
Start: 2022-05-17 | End: 2022-05-20

## 2022-05-17 RX ORDER — ISOSORBIDE MONONITRATE 60 MG/1
30 TABLET, EXTENDED RELEASE ORAL DAILY
Refills: 0 | Status: DISCONTINUED | OUTPATIENT
Start: 2022-05-17 | End: 2022-05-20

## 2022-05-17 RX ORDER — ATORVASTATIN CALCIUM 80 MG/1
1 TABLET, FILM COATED ORAL
Qty: 0 | Refills: 0 | DISCHARGE

## 2022-05-17 RX ORDER — OMEPRAZOLE 10 MG/1
1 CAPSULE, DELAYED RELEASE ORAL
Qty: 0 | Refills: 0 | DISCHARGE

## 2022-05-17 RX ORDER — DEXTROSE 50 % IN WATER 50 %
12.5 SYRINGE (ML) INTRAVENOUS ONCE
Refills: 0 | Status: DISCONTINUED | OUTPATIENT
Start: 2022-05-17 | End: 2022-05-20

## 2022-05-17 RX ORDER — CARVEDILOL PHOSPHATE 80 MG/1
12.5 CAPSULE, EXTENDED RELEASE ORAL EVERY 12 HOURS
Refills: 0 | Status: DISCONTINUED | OUTPATIENT
Start: 2022-05-17 | End: 2022-05-20

## 2022-05-17 RX ORDER — DEXTROSE 50 % IN WATER 50 %
25 SYRINGE (ML) INTRAVENOUS ONCE
Refills: 0 | Status: DISCONTINUED | OUTPATIENT
Start: 2022-05-17 | End: 2022-05-20

## 2022-05-17 RX ORDER — ASPIRIN/CALCIUM CARB/MAGNESIUM 324 MG
81 TABLET ORAL DAILY
Refills: 0 | Status: DISCONTINUED | OUTPATIENT
Start: 2022-05-17 | End: 2022-05-20

## 2022-05-17 RX ORDER — FUROSEMIDE 40 MG
40 TABLET ORAL EVERY 12 HOURS
Refills: 0 | Status: DISCONTINUED | OUTPATIENT
Start: 2022-05-17 | End: 2022-05-18

## 2022-05-17 RX ORDER — DEXTROSE 50 % IN WATER 50 %
15 SYRINGE (ML) INTRAVENOUS ONCE
Refills: 0 | Status: DISCONTINUED | OUTPATIENT
Start: 2022-05-17 | End: 2022-05-20

## 2022-05-17 RX ORDER — BRINZOLAMIDE/BRIMONIDINE TARTRATE 10; 2 MG/ML; MG/ML
1 SUSPENSION/ DROPS OPHTHALMIC
Qty: 0 | Refills: 0 | DISCHARGE

## 2022-05-17 RX ORDER — HEPARIN SODIUM 5000 [USP'U]/ML
5000 INJECTION INTRAVENOUS; SUBCUTANEOUS EVERY 8 HOURS
Refills: 0 | Status: DISCONTINUED | OUTPATIENT
Start: 2022-05-17 | End: 2022-05-20

## 2022-05-17 RX ORDER — HYDRALAZINE HCL 50 MG
100 TABLET ORAL EVERY 12 HOURS
Refills: 0 | Status: DISCONTINUED | OUTPATIENT
Start: 2022-05-17 | End: 2022-05-20

## 2022-05-17 RX ADMIN — AMLODIPINE BESYLATE 5 MILLIGRAM(S): 2.5 TABLET ORAL at 05:38

## 2022-05-17 RX ADMIN — TAMSULOSIN HYDROCHLORIDE 0.4 MILLIGRAM(S): 0.4 CAPSULE ORAL at 22:30

## 2022-05-17 RX ADMIN — Medication 81 MILLIGRAM(S): at 13:27

## 2022-05-17 RX ADMIN — HEPARIN SODIUM 5000 UNIT(S): 5000 INJECTION INTRAVENOUS; SUBCUTANEOUS at 13:28

## 2022-05-17 RX ADMIN — CARVEDILOL PHOSPHATE 12.5 MILLIGRAM(S): 80 CAPSULE, EXTENDED RELEASE ORAL at 09:29

## 2022-05-17 RX ADMIN — Medication 100 MILLIGRAM(S): at 09:28

## 2022-05-17 RX ADMIN — HEPARIN SODIUM 5000 UNIT(S): 5000 INJECTION INTRAVENOUS; SUBCUTANEOUS at 05:36

## 2022-05-17 RX ADMIN — Medication 40 MILLIGRAM(S): at 05:37

## 2022-05-17 RX ADMIN — Medication 100 MILLIGRAM(S): at 17:28

## 2022-05-17 RX ADMIN — ATORVASTATIN CALCIUM 10 MILLIGRAM(S): 80 TABLET, FILM COATED ORAL at 22:30

## 2022-05-17 RX ADMIN — ISOSORBIDE MONONITRATE 30 MILLIGRAM(S): 60 TABLET, EXTENDED RELEASE ORAL at 13:28

## 2022-05-17 RX ADMIN — Medication 175 MICROGRAM(S): at 05:37

## 2022-05-17 RX ADMIN — CARVEDILOL PHOSPHATE 12.5 MILLIGRAM(S): 80 CAPSULE, EXTENDED RELEASE ORAL at 17:28

## 2022-05-17 RX ADMIN — Medication 40 MILLIGRAM(S): at 17:29

## 2022-05-17 RX ADMIN — HEPARIN SODIUM 5000 UNIT(S): 5000 INJECTION INTRAVENOUS; SUBCUTANEOUS at 22:30

## 2022-05-17 NOTE — H&P ADULT - HISTORY OF PRESENT ILLNESS
81M PMH CAD (s/p MACI x 2 last in 2019), HFpEF (50% in 2020), DM2, HTN, HLD, ?CKD3 based on old labs, hypothyroidism presents with 2 weeks of chest heaviness. Reports sensation is different from his past MIs and is more associated with breathing than exertion. He also notes worsening QUIROS, orthopnea, and LE swelling. He reports  no missed doses of home medications. Follows with  Nephfrancine and Dr Rosado Cardio. Despite taking his home lasix dose he reports decreased urine output but is still making urine. Denies fever, chills, chest pain with exertion, SOB at rest, abdominal pain, N/V/D/C, dysuria, hematuria.    	Meds: hydralazine 100 bid, lisinopril 10, coreg 12.5 bid, levothy 175mcg, omeprazole 20, norvasc 5, flomax .4, asa 81, lipitor 10, tradjenta 5, lasix 40 qd, isosorbide mono 30.    Per outpatient nephrology notes, patient last saw nephrologist 2021, per documentation was supposed to have started Lasix 80mg with plan to increase to Lasix 80mg bid. Nephrologist discussed dialysis with patient and family multiple times, but patient was not interested.     ED course:   vitals wnl, BP 150s/70s on room air   CBC shows Hgb 11 appears to be baseline, trops 62---> 65, CMP shows HCO3 21, BUN 80, SCr 4.38 (baseline ~4), pro-BNP 1441 (was 1306 in Feb 2020)  VBG wnl, negative lactate   UA negative for signs of acute infection, urine protein/Cr 1.3, FeNa 2.8 %, FeUrea pending   RVP negative, CXR clear     Patient was given Lasix 40mg IV x1 and    81M PMH CAD (s/p MACI x 2 last in 2019), HFpEF (50% in 2020), DM2, HTN, HLD, ?CKD3 based on old labs, hypothyroidism presents with 2 weeks of chest heaviness. Reports sensation is different from his past MIs and is more associated with breathing than exertion. He also notes worsening QUIROS, orthopnea, and LE swelling. He reports  no missed doses of home medications. Follows with  Nephfrancine and Dr Rosado Cardiology fellow. Despite taking his home lasix dose he reports decreased urine output but is still making urine. Denies fever, chills, chest pain with exertion, SOB at rest, abdominal pain, N/V/D/C, dysuria, hematuria.  No recent decreased PO intake, no recent NSAID use, n/v, or diarrhea     	Meds: hydralazine 100 bid, lisinopril 10, coreg 12.5 bid, levothy 175mcg, omeprazole 20, norvasc 5, flomax .4, asa 81, lipitor 10, tradjenta 5, lasix 40 bid, isosorbide mono 30 daily.    Per outpatient nephrology notes, patient last saw nephrologist 2021, per documentation was on Lasix 80mg daily with plan to increase to Lasix 80mg bid. Nephrologist discussed dialysis with patient and family multiple times, but patient was not interested.     ED course:   vitals wnl, BP 150s/70s on room air   CBC shows Hgb 11 appears to be baseline, trops 62---> 65, CMP shows HCO3 21, BUN 80, SCr 4.38 (baseline ~4), pro-BNP 1441 (was 1306 in Feb 2020)  VBG wnl, negative lactate   UA negative for signs of acute infection, urine protein/Cr 1.3, FeNa 2.8 %, FeUrea pending   RVP negative, CXR clear     Patient was given Lasix 40mg IV x1 and . Nephrology was called recommended 40mg IV lasix and urine studies   Per patient significant urine output after Lasix IV 40    81M PMH CAD (s/p MACI x 2 last in 2019), HFpEF (50% in 2020), DM2, HTN, HLD, CKD4 based on old labs, hypothyroidism presents with 2 weeks of chest heaviness. Reports sensation is different from his past MIs and is more associated with breathing than exertion. He also notes worsening QUIROS, orthopnea, and LE swelling. He reports  no missed doses of home medications. Follows with  Nephfrancine and Dr Rosado Cardiology fellow. Despite taking his home lasix dose he reports decreased urine output but is still making urine. Denies fever, chills, chest pain with exertion, SOB at rest, abdominal pain, N/V/D/C, dysuria, hematuria.  No recent decreased PO intake, no recent NSAID use, n/v, or diarrhea     	Meds: hydralazine 100 bid, lisinopril 10, coreg 12.5 bid, levothy 175mcg, omeprazole 20, norvasc 5, flomax .4, asa 81, lipitor 10, tradjenta 5, lasix 40 bid, isosorbide mono 30 daily.    Per outpatient nephrology notes, patient last saw nephrologist 2021, per documentation was on Lasix 80mg daily with plan to increase to Lasix 80mg bid. Nephrologist discussed dialysis with patient and family multiple times, but patient was not interested.     ED course:   vitals wnl, BP 150s/70s on room air   CBC shows Hgb 11 appears to be baseline, trops 62---> 65, CMP shows HCO3 21, BUN 80, SCr 4.38 (baseline ~4), pro-BNP 1441 (was 1306 in Feb 2020)  VBG wnl, negative lactate   UA negative for signs of acute infection, urine protein/Cr 1.3, FeNa 2.8 %, FeUrea pending   RVP negative, CXR clear     Patient was given Lasix 40mg IV x1 and . Nephrology was called recommended 40mg IV lasix and urine studies   Per patient significant urine output after Lasix IV 40    81M PMH CAD (s/p MACI x 2 last in 2019), HFpEF (50% in 2020), DM2, HTN, HLD, CKD4 based on old labs, hypothyroidism presents with 2 weeks of chest heaviness. Reports sensation is different from his past MIs and is more associated with breathing than exertion. He also notes worsening QUIROS, orthopnea, and LE swelling. He reports  no missed doses of home medications. Follows with  Nephfrancine and Dr Rosado Cardiology fellow. Despite taking his home lasix dose he reports decreased urine output but is still making urine. Denies fever, chills, chest pain with exertion, SOB at rest, abdominal pain, N/V/D/C, dysuria, hematuria. Pt denies any relieving or exacerbating factors.   No recent decreased PO intake, no recent NSAID use, n/v, or diarrhea     	Meds: hydralazine 100 bid, lisinopril 10, coreg 12.5 bid, levothy 175mcg, omeprazole 20, norvasc 5, flomax .4, asa 81, lipitor 10, tradjenta 5, lasix 40 bid, isosorbide mono 30 daily.    Per outpatient nephrology notes, patient last saw nephrologist 2021, per documentation was on Lasix 80mg daily with plan to increase to Lasix 80mg bid. Nephrologist discussed dialysis with patient and family multiple times, but patient was not interested.     ED course:   vitals wnl, BP 150s/70s on room air   CBC shows Hgb 11 appears to be baseline, trops 62---> 65, CMP shows HCO3 21, BUN 80, SCr 4.38 (baseline ~4), pro-BNP 1441 (was 1306 in Feb 2020)  VBG wnl, negative lactate   UA negative for signs of acute infection, urine protein/Cr 1.3, FeNa 2.8 %, FeUrea pending   RVP negative, CXR clear     Patient was given Lasix 40mg IV x1 and . Nephrology was called recommended 40mg IV lasix and urine studies   Per patient significant urine output after Lasix IV 40

## 2022-05-17 NOTE — CONSULT NOTE ADULT - ATTENDING COMMENTS
# Stage 4 CKD. Secondary to diabetic nephropathy. Baselien serum creatinine 3.9-4.4mg/dL.     # Volume overload - agree with IV furosemide 40mg BID. Monitor I/O and weights daily.     # Medication toxicity monitoring: Since patient is on loop diuretic, we will continue to monitor for hypokalemia.     The rest of the recommendations as per fellow's note.    Kathryn Fernandes MD  Attending Nephrologist  [phone]

## 2022-05-17 NOTE — PATIENT PROFILE ADULT - FALL HARM RISK - RISK INTERVENTIONS

## 2022-05-17 NOTE — H&P ADULT - NSHPPHYSICALEXAM_GEN_ALL_CORE
PHYSICAL EXAM:  GENERAL: NAD, lying in bed comfortably  HEAD:  Atraumatic, Normocephalic  EYES: EOMI, PERRLA, conjunctiva and sclera clear  ENT: Moist mucous membranes  NECK: Supple, No JVD  CHEST/LUNG: Clear to auscultation bilaterally; No rales, rhonchi, wheezing, or rubs. Unlabored respirations  HEART: Regular rate and rhythm; No murmurs, rubs, or gallops  ABDOMEN: Bowel sounds present; Soft, Nontender, Nondistended. No hepatomegally  EXTREMITIES:  2+ Peripheral Pulses, brisk capillary refill. No clubbing, cyanosis, or edema  NERVOUS SYSTEM:  Alert & Oriented X3, speech clear. No deficits   MSK: FROM all 4 extremities, full and equal strength  SKIN: No rashes or lesions PHYSICAL EXAM:  GENERAL: NAD, lying in bed comfortably, on RA   HEAD:  Atraumatic, Normocephalic  EYES: EOMI, PERRLA, conjunctiva and sclera clear  ENT: Moist mucous membranes  NECK: Supple, No JVD  CHEST/LUNG: Clear to auscultation bilaterally; No rales, rhonchi, wheezing, or rubs. Unlabored respirations  HEART: Regular rate and rhythm; No murmurs, rubs, or gallops  ABDOMEN: Bowel sounds present; Soft, Nontender, Nondistended. No hepatomegally  EXTREMITIES:  LE edema, R>L   NERVOUS SYSTEM:  Alert & Oriented X3, speech clear. No deficits   MSK: FROM all 4 extremities, full and equal strength  SKIN: venous stasis ulcers bilaterally PHYSICAL EXAM:  GENERAL: NAD, lying in bed comfortably, on RA   HEAD:  Atraumatic, Normocephalic  EYES: EOMI, PERRLA, conjunctiva and sclera clear  ENT: Moist mucous membranes  NECK: Supple, No JVD  CHEST/LUNG: Clear to auscultation bilaterally; No rales, rhonchi, wheezing, or rubs. Unlabored respirations  HEART: Regular rate and rhythm; No murmurs, rubs, or gallops  ABDOMEN: Bowel sounds present; Soft, Nontender, Nondistended.   EXTREMITIES:  LE edema, R>L   NERVOUS SYSTEM:  Alert & Oriented X3, speech clear. No deficits   MSK: FROM all 4 extremities, full and equal strength  SKIN: venous stasis ulcers bilaterally PHYSICAL EXAM:  ICU Vital Signs Last 24 Hrs  T(C): 36.4 (17 May 2022 02:48), Max: 36.8 (17 May 2022 00:13)  T(F): 97.6 (17 May 2022 02:48), Max: 98.3 (17 May 2022 00:13)  HR: 65 (17 May 2022 02:48) (60 - 67)  BP: 148/79 (17 May 2022 02:48) (140/81 - 148/79)  RR: 20 (17 May 2022 02:48) (16 - 20)  SpO2: 98% (17 May 2022 02:48) (97% - 98%)    GENERAL: NAD, lying in bed comfortably, on RA   HEAD:  Atraumatic, Normocephalic  EYES: EOMI, PERRLA, conjunctiva and sclera clear  ENT: Moist mucous membranes  NECK: Supple, No JVD  CHEST/LUNG: Clear to auscultation bilaterally; No rales, rhonchi, wheezing, or rubs. Unlabored respirations  HEART: Regular rate and rhythm; No murmurs, rubs, or gallops  ABDOMEN: Bowel sounds present; Soft, Nontender, Nondistended.   EXTREMITIES:  LE edema, R>L   NERVOUS SYSTEM:  Alert & Oriented X3, speech clear. No deficits   MSK: FROM all 4 extremities, full and equal strength  SKIN: venous stasis ulcers bilaterally PHYSICAL EXAM:  ICU Vital Signs Last 24 Hrs  T(C): 36.4 (17 May 2022 02:48), Max: 36.8 (17 May 2022 00:13)  T(F): 97.6 (17 May 2022 02:48), Max: 98.3 (17 May 2022 00:13)  HR: 65 (17 May 2022 02:48) (60 - 67)  BP: 148/79 (17 May 2022 02:48) (140/81 - 148/79)  RR: 20 (17 May 2022 02:48) (16 - 20)  SpO2: 98% (17 May 2022 02:48) (97% - 98%)    GENERAL: NAD, lying in bed comfortably, on RA   HEAD:  Atraumatic, Normocephalic  EYES: EOMI, PERRLA, conjunctiva and sclera clear  ENT: Moist mucous membranes  NECK: Supple, No JVD  CHEST/LUNG: Clear to auscultation bilaterally; No rales, rhonchi, wheezing, or rubs. Unlabored respirations  HEART: Regular rate and rhythm; No murmurs, rubs, or gallops  ABDOMEN: Bowel sounds present; Soft, Nontender, Nondistended.   EXTREMITIES:  LE edema, R>L   NERVOUS SYSTEM:  Alert & Oriented X3, speech clear. No deficits   MSK: FROM all 4 extremities, full and equal strength  SKIN: venous stasis dermatitis bilaterally

## 2022-05-17 NOTE — H&P ADULT - PROBLEM SELECTOR PLAN 4
Continue home LTX   - will check TSH On hydral 100 bid, Lisinopril 10, Coreg 12.5mg bid, amlodipine 5, Lasix 40mg bid, and Isosorbide mononitrate 30mg daily     - Continue Coreg  - Lasix IV 40 bid   - hold all other HTN meds, and add back as needed given current BP 140s/70s R>L   Likely in setting of HF and CKD  - will obtain LE dopplers to rule out DVT  - diuresis as above

## 2022-05-17 NOTE — H&P ADULT - PROBLEM SELECTOR PLAN 8
DVT ppx: HSQ  Diet: CC/DASH   Dispo: pending improvement of clinical status DVT ppx: HSQ  Diet: CC/DASH   Dispo: pending improvement of clinical status  PT consult On Tradjenta    - ISS   - A1c

## 2022-05-17 NOTE — PROGRESS NOTE ADULT - PROBLEM SELECTOR PLAN 2
Worsening CKD or ALDEN/CKD   CKD 4   BUN 80, SCr 4.38 (baseline ~4)  Follows with  outpatient   CKD stage 4 likely 2/2 diabetic nephropathy   On 40 PO lasix bid at home   Endorses some SOB, and chest heaviness  No signs of uremia, no electrolyte derangements   s/p 40IV lasix with urine output   FeNa 2.8, Feurea pending   UA pretty bland with urine protein/Cr 1.3  CXR clear      -Nephrology consulted   - Feurea pending   - renal U/S to r/o hydronephrosis   - Strict I/O   - Lasix 40mg IV bid   - avoid nephrotoxic agents Worsening CKD or ALDEN/CKD   CKD 4   BUN 80, SCr 4.38 (baseline ~4)  Follows with  outpatient   CKD stage 4 likely 2/2 diabetic nephropathy   On 40 PO lasix bid at home   Endorses some SOB, and chest heaviness  No signs of uremia, no electrolyte derangements   s/p 40IV lasix with urine output   FeNa 2.8, Feurea pending   UA pretty bland with urine protein/Cr 1.3  CXR clear     - Nephrology consulted, appreciate recs   - Feurea 37.1 suggesting intrinsic renal disease  - f/u renal U/S to r/o hydronephrosis   - Strict I/O   - c/w Lasix 40mg IV bid   - avoid nephrotoxic agents Worsening CKD or ALDEN/CKD   CKD 4   BUN 80, SCr 4.38 (baseline ~4)  Follows with  outpatient   CKD stage 4 likely 2/2 diabetic nephropathy   On 40 PO lasix bid at home   Endorses some SOB, and chest heaviness  No signs of uremia, no electrolyte derangements   s/p 40IV lasix with urine output   FeNa 2.8  UA pretty bland with urine protein/Cr 1.3  CXR clear     - Nephrology consulted, appreciate recs   - Feurea 37.1 suggesting intrinsic renal disease  - f/u renal U/S to r/o hydronephrosis   - Strict I/O   - c/w Lasix 40mg IV bid   - avoid nephrotoxic agents

## 2022-05-17 NOTE — PROGRESS NOTE ADULT - SUBJECTIVE AND OBJECTIVE BOX
PROGRESS NOTE:     CONTACT INFO:  Amadou Hill MD | PGY-1  Pager: [phone] [place], [id number] [place]  Also on TEAMS  After 7pm page night float  On weekends after 1pm check resident assignment tab to see who is covering      Patient is a 81y old  Male who presents with a chief complaint of ALDEN on CKD and possible CHF exacerbation (17 May 2022 00:49)      SUBJECTIVE / OVERNIGHT EVENTS:    - No acute events overnight.   - No fevers/chills.  - Patient seen and evaluated at bedside.  - Denies SOB at rest, chest pain, palpitations, abdominal pain, nausea/vomiting    ADDITIONAL REVIEW OF SYSTEMS:    MEDICATIONS  (STANDING):  amLODIPine   Tablet 5 milliGRAM(s) Oral daily  aspirin enteric coated 81 milliGRAM(s) Oral daily  atorvastatin 10 milliGRAM(s) Oral at bedtime  carvedilol 12.5 milliGRAM(s) Oral every 12 hours  dextrose 5%. 1000 milliLiter(s) (100 mL/Hr) IV Continuous <Continuous>  dextrose 5%. 1000 milliLiter(s) (50 mL/Hr) IV Continuous <Continuous>  dextrose 50% Injectable 25 Gram(s) IV Push once  dextrose 50% Injectable 12.5 Gram(s) IV Push once  dextrose 50% Injectable 25 Gram(s) IV Push once  furosemide   Injectable 40 milliGRAM(s) IV Push every 12 hours  glucagon  Injectable 1 milliGRAM(s) IntraMuscular once  heparin   Injectable 5000 Unit(s) SubCutaneous every 8 hours  hydrALAZINE 100 milliGRAM(s) Oral every 12 hours  insulin lispro (ADMELOG) corrective regimen sliding scale   SubCutaneous three times a day before meals  insulin lispro (ADMELOG) corrective regimen sliding scale   SubCutaneous at bedtime  isosorbide   mononitrate ER Tablet (IMDUR) 30 milliGRAM(s) Oral daily  levothyroxine 175 MICROGram(s) Oral daily  tamsulosin 0.4 milliGRAM(s) Oral at bedtime    MEDICATIONS  (PRN):  dextrose Oral Gel 15 Gram(s) Oral once PRN Blood Glucose LESS THAN 70 milliGRAM(s)/deciliter      CAPILLARY BLOOD GLUCOSE      POCT Blood Glucose.: 100 mg/dL (17 May 2022 02:57)    I&O's Summary      PHYSICAL EXAM:  Vital Signs Last 24 Hrs  T(C): 37.1 (17 May 2022 04:29), Max: 37.1 (17 May 2022 04:29)  T(F): 98.7 (17 May 2022 04:29), Max: 98.7 (17 May 2022 04:29)  HR: 60 (17 May 2022 04:29) (60 - 67)  BP: 149/78 (17 May 2022 04:29) (140/81 - 149/78)  BP(mean): --  RR: 17 (17 May 2022 04:29) (16 - 20)  SpO2: 94% (17 May 2022 04:29) (94% - 98%)    CONSTITUTIONAL: NAD, well-developed  RESPIRATORY: Normal respiratory effort; lungs are clear to auscultation bilaterally  CARDIOVASCULAR: Regular rate and rhythm, normal S1 and S2, no murmur/rub/gallop; No lower extremity edema; Peripheral pulses are 2+ bilaterally  ABDOMEN: Nontender to palpation, normoactive bowel sounds, no rebound/guarding; No hepatosplenomegaly  MUSCULOSKELETAL: no clubbing or cyanosis of digits; no joint swelling or tenderness to palpation  PSYCH: A+O to person, place, and time; affect appropriate    LABS:                        10.1   7.98  )-----------( 156      ( 17 May 2022 06:37 )             32.0     05-16    140  |  104  |  80<H>  ----------------------------<  109<H>  4.5   |  21<L>  |  4.38<H>    Ca    9.7      16 May 2022 19:19  Phos  4.0     05-16  Mg     2.2     05-16    TPro  7.6  /  Alb  4.0  /  TBili  0.2  /  DBili  x   /  AST  20  /  ALT  15  /  AlkPhos  125<H>  05-16    PT/INR - ( 17 May 2022 00:24 )   PT: 12.4 sec;   INR: 1.08 ratio         PTT - ( 17 May 2022 00:24 )  PTT:28.7 sec  CARDIAC MARKERS ( 16 May 2022 22:31 )  x     / x     / 118 U/L / x     / 3.8 ng/mL      Urinalysis Basic - ( 16 May 2022 23:39 )    Color: Light Yellow / Appearance: Clear / S.012 / pH: x  Gluc: x / Ketone: Negative  / Bili: Negative / Urobili: Negative   Blood: x / Protein: 100 / Nitrite: Negative   Leuk Esterase: Negative / RBC: 0 /hpf / WBC 0 /HPF   Sq Epi: x / Non Sq Epi: 0 /hpf / Bacteria: Negative          RADIOLOGY & ADDITIONAL TESTS:  Results Reviewed:   Imaging Personally Reviewed:  Electrocardiogram Personally Reviewed:    COORDINATION OF CARE:  Care Discussed with Consultants/Other Providers [Y/N]: Y  Prior or Outpatient Records Reviewed [Y/N]: Y   PROGRESS NOTE:     CONTACT INFO:  Amadou Hill MD | PGY-1  Pager: [phone] [place], [id number] [place]  Also on TEAMS  After 7pm page night float  On weekends after 1pm check resident assignment tab to see who is covering      Patient is a 81y old  Male who presents with a chief complaint of ALDEN on CKD and possible CHF exacerbation (17 May 2022 00:49)      SUBJECTIVE / OVERNIGHT EVENTS:    - Patient endorses localized left chest pain   - No acute events overnight.   - No fevers/chills.  - Patient seen and evaluated at bedside.  - Denies abdominal pain, nausea/vomiting    ADDITIONAL REVIEW OF SYSTEMS:    MEDICATIONS  (STANDING):  amLODIPine   Tablet 5 milliGRAM(s) Oral daily  aspirin enteric coated 81 milliGRAM(s) Oral daily  atorvastatin 10 milliGRAM(s) Oral at bedtime  carvedilol 12.5 milliGRAM(s) Oral every 12 hours  dextrose 5%. 1000 milliLiter(s) (100 mL/Hr) IV Continuous <Continuous>  dextrose 5%. 1000 milliLiter(s) (50 mL/Hr) IV Continuous <Continuous>  dextrose 50% Injectable 25 Gram(s) IV Push once  dextrose 50% Injectable 12.5 Gram(s) IV Push once  dextrose 50% Injectable 25 Gram(s) IV Push once  furosemide   Injectable 40 milliGRAM(s) IV Push every 12 hours  glucagon  Injectable 1 milliGRAM(s) IntraMuscular once  heparin   Injectable 5000 Unit(s) SubCutaneous every 8 hours  hydrALAZINE 100 milliGRAM(s) Oral every 12 hours  insulin lispro (ADMELOG) corrective regimen sliding scale   SubCutaneous three times a day before meals  insulin lispro (ADMELOG) corrective regimen sliding scale   SubCutaneous at bedtime  isosorbide   mononitrate ER Tablet (IMDUR) 30 milliGRAM(s) Oral daily  levothyroxine 175 MICROGram(s) Oral daily  tamsulosin 0.4 milliGRAM(s) Oral at bedtime    MEDICATIONS  (PRN):  dextrose Oral Gel 15 Gram(s) Oral once PRN Blood Glucose LESS THAN 70 milliGRAM(s)/deciliter      CAPILLARY BLOOD GLUCOSE      POCT Blood Glucose.: 100 mg/dL (17 May 2022 02:57)    I&O's Summary      PHYSICAL EXAM:  Vital Signs Last 24 Hrs  T(C): 37.1 (17 May 2022 04:29), Max: 37.1 (17 May 2022 04:29)  T(F): 98.7 (17 May 2022 04:29), Max: 98.7 (17 May 2022 04:29)  HR: 60 (17 May 2022 04:29) (60 - 67)  BP: 149/78 (17 May 2022 04:29) (140/81 - 149/78)  BP(mean): --  RR: 17 (17 May 2022 04:29) (16 - 20)  SpO2: 94% (17 May 2022 04:29) (94% - 98%)    CONSTITUTIONAL: Fatigue, well-developed, no weight loss  RESPIRATORY: Normal respiratory effort; lungs are clear to auscultation bilaterally, dry cough  CARDIOVASCULAR: Regular rate and rhythm, normal S1 and S2, no murmur/rub/gallop; No lower extremity edema; Peripheral pulses are 2+ bilaterally  ABDOMEN: Nontender to palpation, normoactive bowel sounds, no rebound/guarding; No hepatosplenomegaly  MUSCULOSKELETAL: no clubbing or cyanosis of digits; no joint swelling or tenderness to palpation, back pain, LE edema, R >L   PSYCH: A+O to person, place, and time; affect appropriate  SKIN: venous stasis dermatitis bilaterally    LABS:                        10.1   7.98  )-----------( 156      ( 17 May 2022 06:37 )             32.0     05-    140  |  104  |  80<H>  ----------------------------<  109<H>  4.5   |  21<L>  |  4.38<H>    Ca    9.7      16 May 2022 19:19  Phos  4.0     05-16  Mg     2.2     05-16    TPro  7.6  /  Alb  4.0  /  TBili  0.2  /  DBili  x   /  AST  20  /  ALT  15  /  AlkPhos  125<H>  05-16    PT/INR - ( 17 May 2022 00:24 )   PT: 12.4 sec;   INR: 1.08 ratio         PTT - ( 17 May 2022 00:24 )  PTT:28.7 sec  CARDIAC MARKERS ( 16 May 2022 22:31 )  x     / x     / 118 U/L / x     / 3.8 ng/mL      Urinalysis Basic - ( 16 May 2022 23:39 )    Color: Light Yellow / Appearance: Clear / S.012 / pH: x  Gluc: x / Ketone: Negative  / Bili: Negative / Urobili: Negative   Blood: x / Protein: 100 / Nitrite: Negative   Leuk Esterase: Negative / RBC: 0 /hpf / WBC 0 /HPF   Sq Epi: x / Non Sq Epi: 0 /hpf / Bacteria: Negative          RADIOLOGY & ADDITIONAL TESTS:  Results Reviewed:   Imaging Personally Reviewed:  Electrocardiogram Personally Reviewed:    COORDINATION OF CARE:  Care Discussed with Consultants/Other Providers [Y/N]: Y  Prior or Outpatient Records Reviewed [Y/N]: Y

## 2022-05-17 NOTE — H&P ADULT - NSHPLABSRESULTS_GEN_ALL_CORE
LABS:                          11.3   9.79  )-----------( 175      ( 16 May 2022 19:19 )             35.6     -    140  |  104  |  80<H>  ----------------------------<  109<H>  4.5   |  21<L>  |  4.38<H>    Ca    9.7      16 May 2022 19:19  Phos  4.0     -  Mg     2.2     -    TPro  7.6  /  Alb  4.0  /  TBili  0.2  /  DBili  x   /  AST  20  /  ALT  15  /  AlkPhos  125<H>  -    LIVER FUNCTIONS - ( 16 May 2022 19:19 )  Alb: 4.0 g/dL / Pro: 7.6 g/dL / ALK PHOS: 125 U/L / ALT: 15 U/L / AST: 20 U/L / GGT: x           PT/INR - ( 17 May 2022 00:24 )   PT: 12.4 sec;   INR: 1.08 ratio         PTT - ( 17 May 2022 00:24 )  PTT:28.7 sec  Urinalysis Basic - ( 16 May 2022 23:39 )    Color: Light Yellow / Appearance: Clear / S.012 / pH: x  Gluc: x / Ketone: Negative  / Bili: Negative / Urobili: Negative   Blood: x / Protein: 100 / Nitrite: Negative   Leuk Esterase: Negative / RBC: 0 /hpf / WBC 0 /HPF   Sq Epi: x / Non Sq Epi: 0 /hpf / Bacteria: Negative Personally reviewed imaging, labs, ekg.    LABS:                          11.3   9.79  )-----------( 175      ( 16 May 2022 19:19 )             35.6     -    140  |  104  |  80<H>  ----------------------------<  109<H>  4.5   |  21<L>  |  4.38<H>    Ca    9.7      16 May 2022 19:19  Phos  4.0     -  Mg     2.2     -    TPro  7.6  /  Alb  4.0  /  TBili  0.2  /  DBili  x   /  AST  20  /  ALT  15  /  AlkPhos  125<H>      LIVER FUNCTIONS - ( 16 May 2022 19:19 )  Alb: 4.0 g/dL / Pro: 7.6 g/dL / ALK PHOS: 125 U/L / ALT: 15 U/L / AST: 20 U/L / GGT: x           PT/INR - ( 17 May 2022 00:24 )   PT: 12.4 sec;   INR: 1.08 ratio         PTT - ( 17 May 2022 00:24 )  PTT:28.7 sec  Urinalysis Basic - ( 16 May 2022 23:39 )    Color: Light Yellow / Appearance: Clear / S.012 / pH: x  Gluc: x / Ketone: Negative  / Bili: Negative / Urobili: Negative   Blood: x / Protein: 100 / Nitrite: Negative   Leuk Esterase: Negative / RBC: 0 /hpf / WBC 0 /HPF   Sq Epi: x / Non Sq Epi: 0 /hpf / Bacteria: Negative

## 2022-05-17 NOTE — PROGRESS NOTE ADULT - PROBLEM SELECTOR PLAN 5
On hydral 100 bid, Lisinopril 10, Coreg 12.5mg bid, amlodipine 5, Lasix 40mg bid, and Isosorbide mononitrate 30mg daily     - Continue Coreg, amlodipine, Isosorbide mononitrate 30mg daily   - Lasix IV 40 bid

## 2022-05-17 NOTE — PROGRESS NOTE ADULT - PROBLEM SELECTOR PLAN 4
R>L   Likely in setting of HF and CKD  - will obtain LE dopplers to rule out DVT  - diuresis as above R>L   Likely in setting of HF and CKD  - f/u LE dopplers to rule out DVT  - diuresis as above

## 2022-05-17 NOTE — H&P ADULT - PROBLEM SELECTOR PLAN 9
DVT ppx: HSQ  Diet: CC/DASH   Dispo: pending improvement of clinical status  PT consult  Fall precautions

## 2022-05-17 NOTE — PROGRESS NOTE ADULT - PROBLEM SELECTOR PLAN 3
Home meds: Coreg 12.5mg bid, Statin, Lasix 40mg bid, Lisinopril 10  HFpEF 20 % in 2020   Endorses SOB and LE swelling   Could be CHF exacerbation causing sx    - continue Coreg  - Continue Statin  - Lasix 40mg IV bid for diuresis   - hold Lisinopril   - TTE Home meds: Coreg 12.5mg bid, Statin, Lasix 40mg bid, Lisinopril 10  HFpEF 20 % in 2020   Endorses SOB and LE swelling   Could be CHF exacerbation causing sx    - c/w Coreg  - c/w Statin  - Lasix 40mg IV bid for diuresis   - hold Lisinopril   - TTE

## 2022-05-17 NOTE — PHYSICAL THERAPY INITIAL EVALUATION ADULT - ADDITIONAL COMMENTS
Pt lives in a house with daughter and . Lives in basement with 6 steps to enter. Patient was independent with all ADLs and IADLs prior to admission. Uses a cane to walk. Pt owns cane.

## 2022-05-17 NOTE — CONSULT NOTE ADULT - SUBJECTIVE AND OBJECTIVE BOX
[facility] DIVISION OF KIDNEY DISEASES AND HYPERTENSION -- [phone]  -- INITIAL CONSULT NOTE  --------------------------------------------------------------------------------  History obtained using a Macedonian speaking     HPI: 81 year old Male with significant PMH CKD stage 4, CAD (s/p MACI x 2 last in 2019), HFpEF (50% in 2020), DM2, HTN, HLD, and hypothyroidism presents to [facility] on 5/16 with chest heaviness, SOB, and fatigue x 2 weeks.     Kidney History : Pt. is CKD stage 4 likely 2/2 DM, serological work up done outpatient was negative. Follows with Dr. Natalia Sharma. Last seen in November 2021. Pt. has been told that he will be needing hemodialysis for worsening kidney function and he refused in the past (as per outpatient chart). On review of [facility], it was noted that Scr was 4.44 on 11/5/21. Scr remained elevated at 3.97 on 2/18/22. Scr was elevated at 4.38 on admission. UA was WNL with spot urine TP/CR elevated to 1.3 on admission.     PAST HISTORY  --------------------------------------------------------------------------------  PAST MEDICAL & SURGICAL HISTORY:  HTN (hypertension)      Diabetes      Depression      Hypothyroid      ASHD (arteriosclerotic heart disease)      HLD (hyperlipidemia)      CAD (coronary artery disease)      DM (diabetes mellitus)      Glaucoma      History of kidney stones      NSTEMI (non-ST elevated myocardial infarction)      CKD (chronic kidney disease)      History of open heart surgery  CABG x 2V      History of left heart catheterization ([facility]HC)  2000  and 04/2019        FAMILY HISTORY:  No pertinent family history in first degree relatives      PAST SOCIAL HISTORY:    ALLERGIES & MEDICATIONS  --------------------------------------------------------------------------------  Allergies    No Known Drug Allergies  shellfish (Vomiting)    Intolerances    Standing Inpatient Medications  amLODIPine   Tablet 5 milliGRAM(s) Oral daily  aspirin enteric coated 81 milliGRAM(s) Oral daily  atorvastatin 10 milliGRAM(s) Oral at bedtime  carvedilol 12.5 milliGRAM(s) Oral every 12 hours  dextrose 5%. 1000 milliLiter(s) IV Continuous <Continuous>  dextrose 5%. 1000 milliLiter(s) IV Continuous <Continuous>  dextrose 50% Injectable 25 Gram(s) IV Push once  dextrose 50% Injectable 12.5 Gram(s) IV Push once  dextrose 50% Injectable 25 Gram(s) IV Push once  furosemide   Injectable 40 milliGRAM(s) IV Push every 12 hours  glucagon  Injectable 1 milliGRAM(s) IntraMuscular once  heparin   Injectable 5000 Unit(s) SubCutaneous every 8 hours  hydrALAZINE 100 milliGRAM(s) Oral every 12 hours  insulin lispro (ADMELOG) corrective regimen sliding scale   SubCutaneous three times a day before meals  insulin lispro (ADMELOG) corrective regimen sliding scale   SubCutaneous at bedtime  isosorbide   mononitrate ER Tablet (IMDUR) 30 milliGRAM(s) Oral daily  levothyroxine 175 MICROGram(s) Oral daily  tamsulosin 0.4 milliGRAM(s) Oral at bedtime    PRN Inpatient Medications  dextrose Oral Gel 15 Gram(s) Oral once PRN    REVIEW OF SYSTEMS  --------------------------------------------------------------------------------  Gen: No fevers/chills  Skin: No rashes  Head/Eyes/Ears: Normal hearing,   Respiratory: No dyspnea, cough  CV: No chest pain  GI: No abdominal pain, diarrhea  : No dysuria, hematuria  MSK: No  edema  Heme: No easy bruising or bleeding  Psych: No significant depression    All other systems were reviewed and are negative, except as noted.    VITALS/PHYSICAL EXAM  --------------------------------------------------------------------------------  T(C): 37.1 (05-17-22 @ 04:29), Max: 37.1 (05-17-22 @ 04:29)  HR: 60 (05-17-22 @ 04:29) (60 - 67)  BP: 149/78 (05-17-22 @ 04:29) (140/81 - 149/78)  RR: 17 (05-17-22 @ 04:29) (16 - 20)  SpO2: 94% (05-17-22 @ 04:29) (94% - 98%)  Wt(kg): --  Height (cm): 180.3 (05-16-22 @ 15:21)  Weight (kg): 113 (05-17-22 @ 00:13)  BMI (kg/m2): 34.8 (05-17-22 @ 00:13)  BSA (m2): 2.31 (05-17-22 @ 00:13)    Physical Exam:  	Gen: NAD  	HEENT: MMM  	Pulm: CTA B/L  	CV: S1S2  	Abd: Soft, +BS   	Ext: No LE edema B/L  	Neuro: Awake  	Skin: Warm and dry  	Vascular access:    LABS/STUDIES  --------------------------------------------------------------------------------              10.1   7.98  >-----------<  156      [05-17-22 @ 06:37]              32.0     140  |  104  |  80  ----------------------------<  109      [05-16-22 @ 19:19]  4.5   |  21  |  4.38        Ca     9.7     [05-16-22 @ 19:19]      Mg     2.2     [05-16-22 @ 22:31]      Phos  4.0     [05-16-22 @ 22:31]    TPro  7.6  /  Alb  4.0  /  TBili  0.2  /  DBili  x   /  AST  20  /  ALT  15  /  AlkPhos  125  [05-16-22 @ 19:19]    PT/INR: PT 12.4 , INR 1.08       [05-17-22 @ 00:24]  PTT: 28.7       [05-17-22 @ 00:24]          [05-16-22 @ 22:31]    Creatinine Trend:  SCr 4.38 [05-16 @ 19:19]    Urinalysis - [05-16-22 @ 23:39]      Color Light Yellow / Appearance Clear / SG 1.012 / pH 5.5      Gluc Negative / Ketone Negative  / Bili Negative / Urobili Negative       Blood Negative / Protein 100 / Leuk Est Negative / Nitrite Negative      RBC 0 / WBC 0 / Hyaline 0 / Gran  / Sq Epi  / Non Sq Epi 0 / Bacteria Negative    Urine Creatinine 69      [05-16-22 @ 23:39]  Urine Protein 92      [05-16-22 @ 23:39]  Urine Sodium 61      [05-16-22 @ 23:39]  Urine Urea Nitrogen 467      [05-16-22 @ 23:39]  Urine Potassium 38      [05-16-22 @ 23:39]  Urine Chloride 50      [05-16-22 @ 23:39]  Urine Osmolality 367      [05-16-22 @ 23:39]    HbA1c 5.7      [02-12-20 @ 08:41]       [facility] DIVISION OF KIDNEY DISEASES AND HYPERTENSION -- [phone]  -- INITIAL CONSULT NOTE  --------------------------------------------------------------------------------  History obtained using a Thai speaking #[id number]    HPI: 81 year old Male with significant PMH CKD stage 4, CAD (s/p MACI x 2 last in 2019), HFpEF (50% in 2020), DM2, HTN, HLD, and hypothyroidism presents to [facility] on 5/16 with chest heaviness, SOB, and fatigue and worsening LE swelling x 2 weeks.     Kidney History : Pt. is CKD stage 4 likely 2/2 DM, serological work up done outpatient was negative. Follows with Dr. Natalia Sharma. Last seen in November 2021. Pt. has been told that he will be needing hemodialysis for worsening kidney function and he refused in the past (as per outpatient chart). On review of [facility], it was noted that Scr was 4.44 on 11/5/21. Scr remained elevated at 3.97 on 2/18/22. Scr was elevated at 4.38 on admission. UA was WNL with spot urine TP/CR elevated to 1.3 on admission.     Pt. seen and examined at bedside. Pt. reports SOB and LE swelling. Denies CP, HA, N/V, abdominal pain , urinary symptoms or dizziness.     PAST HISTORY  --------------------------------------------------------------------------------  PAST MEDICAL & SURGICAL HISTORY:  HTN (hypertension)      Diabetes      Depression      Hypothyroid      ASHD (arteriosclerotic heart disease)      HLD (hyperlipidemia)      CAD (coronary artery disease)      DM (diabetes mellitus)      Glaucoma      History of kidney stones      NSTEMI (non-ST elevated myocardial infarction)      CKD (chronic kidney disease)      History of open heart surgery  CABG x 2V      History of left heart catheterization ([facility])  2000  and 04/2019        FAMILY HISTORY:  No pertinent family history in first degree relatives      PAST SOCIAL HISTORY:    ALLERGIES & MEDICATIONS  --------------------------------------------------------------------------------  Allergies    No Known Drug Allergies  shellfish (Vomiting)    Intolerances    Standing Inpatient Medications  amLODIPine   Tablet 5 milliGRAM(s) Oral daily  aspirin enteric coated 81 milliGRAM(s) Oral daily  atorvastatin 10 milliGRAM(s) Oral at bedtime  carvedilol 12.5 milliGRAM(s) Oral every 12 hours  dextrose 5%. 1000 milliLiter(s) IV Continuous <Continuous>  dextrose 5%. 1000 milliLiter(s) IV Continuous <Continuous>  dextrose 50% Injectable 25 Gram(s) IV Push once  dextrose 50% Injectable 12.5 Gram(s) IV Push once  dextrose 50% Injectable 25 Gram(s) IV Push once  furosemide   Injectable 40 milliGRAM(s) IV Push every 12 hours  glucagon  Injectable 1 milliGRAM(s) IntraMuscular once  heparin   Injectable 5000 Unit(s) SubCutaneous every 8 hours  hydrALAZINE 100 milliGRAM(s) Oral every 12 hours  insulin lispro (ADMELOG) corrective regimen sliding scale   SubCutaneous three times a day before meals  insulin lispro (ADMELOG) corrective regimen sliding scale   SubCutaneous at bedtime  isosorbide   mononitrate ER Tablet (IMDUR) 30 milliGRAM(s) Oral daily  levothyroxine 175 MICROGram(s) Oral daily  tamsulosin 0.4 milliGRAM(s) Oral at bedtime    PRN Inpatient Medications  dextrose Oral Gel 15 Gram(s) Oral once PRN    REVIEW OF SYSTEMS  --------------------------------------------------------------------------------  Gen: No fevers/chills  Skin: No rashes  Head/Eyes/Ears: Normal hearing,   Respiratory: No dyspnea, cough, see HPI  CV: No chest pain  GI: No abdominal pain, diarrhea  : No dysuria, hematuria  MSK: See HPI  Heme: No easy bruising or bleeding  Psych: No significant depression    All other systems were reviewed and are negative, except as noted.    VITALS/PHYSICAL EXAM  --------------------------------------------------------------------------------  T(C): 37.1 (05-17-22 @ 04:29), Max: 37.1 (05-17-22 @ 04:29)  HR: 60 (05-17-22 @ 04:29) (60 - 67)  BP: 149/78 (05-17-22 @ 04:29) (140/81 - 149/78)  RR: 17 (05-17-22 @ 04:29) (16 - 20)  SpO2: 94% (05-17-22 @ 04:29) (94% - 98%)  Wt(kg): --  Height (cm): 180.3 (05-16-22 @ 15:21)  Weight (kg): 113 (05-17-22 @ 00:13)  BMI (kg/m2): 34.8 (05-17-22 @ 00:13)  BSA (m2): 2.31 (05-17-22 @ 00:13)    Physical Exam:  	Gen: NAD on NC  	HEENT: MMM  	Pulm: CTA B/L, crackles +  	CV: S1S2  	Abd: Soft, +BS   	Ext: B/L LE edema +  	Neuro: Awake  	Skin: Warm and dry  	Vascular access:    LABS/STUDIES  --------------------------------------------------------------------------------              10.1   7.98  >-----------<  156      [05-17-22 @ 06:37]              32.0     140  |  104  |  80  ----------------------------<  109      [05-16-22 @ 19:19]  4.5   |  21  |  4.38        Ca     9.7     [05-16-22 @ 19:19]      Mg     2.2     [05-16-22 @ 22:31]      Phos  4.0     [05-16-22 @ 22:31]    TPro  7.6  /  Alb  4.0  /  TBili  0.2  /  DBili  x   /  AST  20  /  ALT  15  /  AlkPhos  125  [05-16-22 @ 19:19]    PT/INR: PT 12.4 , INR 1.08       [05-17-22 @ 00:24]  PTT: 28.7       [05-17-22 @ 00:24]          [05-16-22 @ 22:31]    Creatinine Trend:  SCr 4.38 [05-16 @ 19:19]    Urinalysis - [05-16-22 @ 23:39]      Color Light Yellow / Appearance Clear / SG 1.012 / pH 5.5      Gluc Negative / Ketone Negative  / Bili Negative / Urobili Negative       Blood Negative / Protein 100 / Leuk Est Negative / Nitrite Negative      RBC 0 / WBC 0 / Hyaline 0 / Gran  / Sq Epi  / Non Sq Epi 0 / Bacteria Negative    Urine Creatinine 69      [05-16-22 @ 23:39]  Urine Protein 92      [05-16-22 @ 23:39]  Urine Sodium 61      [05-16-22 @ 23:39]  Urine Urea Nitrogen 467      [05-16-22 @ 23:39]  Urine Potassium 38      [05-16-22 @ 23:39]  Urine Chloride 50      [05-16-22 @ 23:39]  Urine Osmolality 367      [05-16-22 @ 23:39]

## 2022-05-17 NOTE — H&P ADULT - PROBLEM SELECTOR PLAN 5
Continue flomax  - check RBUS for hydro Continue home LTX   - will check TSH On hydral 100 bid, Lisinopril 10, Coreg 12.5mg bid, amlodipine 5, Lasix 40mg bid, and Isosorbide mononitrate 30mg daily     - Continue Coreg, amlodipine, Isosorbide mononitrate 30mg daily   - Lasix IV 40 bid

## 2022-05-17 NOTE — H&P ADULT - PROBLEM SELECTOR PLAN 3
On hydral 100 bid, Lisinopril 10, Coreg 12.5mg bid, amlodipine 5, Lasix 40mg bid, and Isosorbide mononitrate 30mg daily     - Continue Coreg  - Lasix IV 40 bid   - hold all other HTN meds, and add back as needed given current BP 140s/70s R>L   Likely in setting of HF and CKD  - will obtain LE dopplers to rule out DVT  - diuresis as above Home meds: Coreg 12.5mg bid, Statin, Lasix 40mg bid, Lisinopril 10  HFpEF 20 % in 2020   Endorses SOB and LE swelling   Could be CHF exacerbation causing sx    - continue Coreg  - Continue Statin  - Lasix 40mg IV bid for diuresis   - hold Lisinopril   - TTE

## 2022-05-17 NOTE — H&P ADULT - PROBLEM SELECTOR PLAN 6
On Tradjenta    - ISS   - A1c Continue flomax  - check RBUS for hydro Continue home LTX   - will check TSH

## 2022-05-17 NOTE — H&P ADULT - ASSESSMENT
81M PMH CAD (s/p MACI x 2 last in 2019), HFpEF (50% in 2020), DM2, HTN, HLD, ?CKD3 based on old labs, hypothyroidism presents with 2 weeks of chest heaviness, SOB, and fatigue likely 2/2 worsening CKD/ALDEN on CKD versus HF exacerbation  81M PMH CAD (s/p MACI x 2 last in 2019), HFpEF (50% in 2020), DM2, HTN, HLD, ?CKD3 based on old labs, hypothyroidism presents with 2 weeks of chest heaviness, SOB, and fatigue likely 2/2 worsening CKD/ALDEN versus CKD versus HF exacerbation  81M PMH CAD (s/p MACI x 2 last in 2019), HFpEF (50% in 2020), DM2, HTN, HLD, ?CKD3 based on old labs, hypothyroidism presents with 2 weeks of chest heaviness, SOB, and fatigue likely 2/2 worsening CKD/ALDEN vs CHF exacerbation

## 2022-05-17 NOTE — H&P ADULT - PROBLEM SELECTOR PLAN 1
BUN 80, SCr 4.38 (baseline ~4)  Follows with  outpatient   CKD stage 4 likely 2/2 diabetic nephropathy   On 40 PO lasix bid at home   Endorses some SOB, and chest heaviness  No signs of uremia, no electrolyte derangements   s/p 40IV lasix with urine output   FeNa 2.8, Feurea pending   UA pretty bland with urine protein/Cr 1.3  CXR clear      -Nephrology consulted   - Feurea pending   - RBUS r/o hydronephrosis   - Strict I/O   - Lasix 40mg IV bid   - avoid nephrotoxic agents EKG NSR no ST elevations or TWI   trops 62-->65  Could be stable angina     - TTE  - admit tele  - trend trops  - Will require nuclear stress test at some point either during this hospitalization or outpatient EKG NSR no ST elevations or TWI   trops 62-->65  Could be stable angina     - TTE  - admit tele  - trend trops  - Can get nuclear stress test at some point either during this hospitalization or outpatient

## 2022-05-17 NOTE — H&P ADULT - PROBLEM SELECTOR PLAN 7
DVT ppx: HSQ  Diet: CC/DASH   Dispo: pending improvement of clinical status On Tradjenta    - ISS   - A1c Continue flomax  - check renal US  for hydro

## 2022-05-17 NOTE — PHYSICAL THERAPY INITIAL EVALUATION ADULT - PERTINENT HX OF CURRENT PROBLEM, REHAB EVAL
81 year old Male with significant PMH CKD stage 4, CAD (s/p MACI x 2 last in 2019), HFpEF (50% in 2020), DM2, HTN, HLD, and hypothyroidism presents to [facility] on 5/16 with chest heaviness, SOB, and fatigue and worsening LE swelling x 2 weeks. Chest CT: Bibasilar subsegmental atelectasis.

## 2022-05-17 NOTE — H&P ADULT - PROBLEM SELECTOR PLAN 2
Home meds: Coreg 12.5mg bid, Statin, Lasix 40mg bid, Lisinopril 10    - continue Coreg  - Continue Statin  - Lasix 40mg IV bid   - hold Lisinopril   - TTE Home meds: Coreg 12.5mg bid, Statin, Lasix 40mg bid, Lisinopril 10  HFpEF 20 % in 2020   Endorses SOB and LE swelling     - continue Coreg  - Continue Statin  - Lasix 40mg IV bid   - hold Lisinopril   - TTE Worsening CKD or ALDEN/CKD   CKD 4   BUN 80, SCr 4.38 (baseline ~4)  Follows with  outpatient   CKD stage 4 likely 2/2 diabetic nephropathy   On 40 PO lasix bid at home   Endorses some SOB, and chest heaviness  No signs of uremia, no electrolyte derangements   s/p 40IV lasix with urine output   FeNa 2.8, Feurea pending   UA pretty bland with urine protein/Cr 1.3  CXR clear      -Nephrology consulted   - Feurea pending   - renal U/S to r/o hydronephrosis   - Strict I/O   - Lasix 40mg IV bid   - avoid nephrotoxic agents

## 2022-05-17 NOTE — H&P ADULT - NSHPREVIEWOFSYSTEMS_GEN_ALL_CORE
CONSTITUTIONAL:  No weight loss, fever, chills, weakness or fatigue.  HEENT:  Eyes:  No visual loss, blurred vision, double vision or yellow sclerae. Ears, Nose, Throat:  No hearing loss, sneezing, congestion, runny nose or sore throat.  SKIN:  No rash or itching.  CARDIOVASCULAR:  No chest pain, chest pressure or chest discomfort. No palpitations.  RESPIRATORY:  No shortness of breath, cough or sputum.  GASTROINTESTINAL:  No anorexia, nausea, vomiting or diarrhea. No abdominal pain or blood.  GENITOURINARY:  Denies hematuria, dysuria.   NEUROLOGICAL:  No headache, dizziness, syncope, paralysis, ataxia, numbness or tingling in the extremities. No change in bowel or bladder control.  MUSCULOSKELETAL:  No muscle, back pain, joint pain or stiffness.  HEMATOLOGIC:  No anemia, bleeding or bruising.  LYMPHATICS:  No enlarged nodes.   PSYCHIATRIC:  No history of depression or anxiety.  ENDOCRINOLOGIC:  No reports of sweating, cold or heat intolerance. No polyuria or polydipsia.  ALLERGIES:  No history of asthma, hives, eczema or rhinitis. CONSTITUTIONAL:  (+) fatigue No weight loss, fever, chills,   HEENT:  Eyes:  No visual loss, blurred vision, double vision or yellow sclerae.   Ears, Nose, Throat:  (+) dry cough No hearing loss, sneezing, congestion, runny nose or sore throat.  SKIN:  No rash or itching.  CARDIOVASCULAR:  (+) chest pressure No chest pain,  or chest discomfort. No palpitations.  RESPIRATORY:  (+) dry cough (+) SOB No sputum.  GASTROINTESTINAL:  No anorexia, nausea, vomiting or diarrhea. No abdominal pain or blood.  GENITOURINARY:  Denies hematuria, dysuria.   NEUROLOGICAL:  No headache, dizziness, syncope, paralysis, ataxia, numbness or tingling in the extremities. No change in bowel or bladder control.  MUSCULOSKELETAL: (+) back pain  No muscle,  joint pain or stiffness.  HEMATOLOGIC:  No anemia, bleeding or bruising.  LYMPHATICS:  No enlarged nodes.   PSYCHIATRIC:  No history of depression or anxiety.  ENDOCRINOLOGIC:  No reports of sweating, cold or heat intolerance. No polyuria or polydipsia.  ALLERGIES:  No history of asthma, hives, eczema or rhinitis.

## 2022-05-17 NOTE — PROGRESS NOTE ADULT - PROBLEM SELECTOR PLAN 1
EKG NSR no ST elevations or TWI   trops 62-->65  Could be stable angina     - TTE  - admit tele  - trend trops  - Can get nuclear stress test at some point either during this hospitalization or outpatient EKG NSR no ST elevations or TWI   trops 62-->65  Could be stable angina     - Ordered TTE  - admit tele  - trend trops  - Can get nuclear stress test at some point either during this hospitalization or outpatient

## 2022-05-18 LAB
A1C WITH ESTIMATED AVERAGE GLUCOSE RESULT: 6.1 % — HIGH (ref 4–5.6)
ALBUMIN SERPL ELPH-MCNC: 3.8 G/DL — SIGNIFICANT CHANGE UP (ref 3.3–5)
ALP SERPL-CCNC: 107 U/L — SIGNIFICANT CHANGE UP (ref 40–120)
ALT FLD-CCNC: 12 U/L — SIGNIFICANT CHANGE UP (ref 10–45)
ANION GAP SERPL CALC-SCNC: 22 MMOL/L — HIGH (ref 5–17)
AST SERPL-CCNC: 20 U/L — SIGNIFICANT CHANGE UP (ref 10–40)
BILIRUB SERPL-MCNC: 0.3 MG/DL — SIGNIFICANT CHANGE UP (ref 0.2–1.2)
BUN SERPL-MCNC: 90 MG/DL — HIGH (ref 7–23)
CALCIUM SERPL-MCNC: 9.5 MG/DL — SIGNIFICANT CHANGE UP (ref 8.4–10.5)
CHLORIDE SERPL-SCNC: 104 MMOL/L — SIGNIFICANT CHANGE UP (ref 96–108)
CO2 SERPL-SCNC: 16 MMOL/L — LOW (ref 22–31)
CREAT SERPL-MCNC: 4.84 MG/DL — HIGH (ref 0.5–1.3)
EGFR: 11 ML/MIN/1.73M2 — LOW
ESTIMATED AVERAGE GLUCOSE: 128 MG/DL — HIGH (ref 68–114)
FERRITIN SERPL-MCNC: 151 NG/ML — SIGNIFICANT CHANGE UP (ref 30–400)
GLUCOSE BLDC GLUCOMTR-MCNC: 106 MG/DL — HIGH (ref 70–99)
GLUCOSE BLDC GLUCOMTR-MCNC: 112 MG/DL — HIGH (ref 70–99)
GLUCOSE BLDC GLUCOMTR-MCNC: 124 MG/DL — HIGH (ref 70–99)
GLUCOSE BLDC GLUCOMTR-MCNC: 135 MG/DL — HIGH (ref 70–99)
GLUCOSE SERPL-MCNC: 105 MG/DL — HIGH (ref 70–99)
HCT VFR BLD CALC: 32.1 % — LOW (ref 39–50)
HGB BLD-MCNC: 10.3 G/DL — LOW (ref 13–17)
IRON SATN MFR SERPL: 25 % — SIGNIFICANT CHANGE UP (ref 16–55)
IRON SATN MFR SERPL: 55 UG/DL — SIGNIFICANT CHANGE UP (ref 45–165)
MAGNESIUM SERPL-MCNC: 2.2 MG/DL — SIGNIFICANT CHANGE UP (ref 1.6–2.6)
MCHC RBC-ENTMCNC: 31.5 PG — SIGNIFICANT CHANGE UP (ref 27–34)
MCHC RBC-ENTMCNC: 32.1 GM/DL — SIGNIFICANT CHANGE UP (ref 32–36)
MCV RBC AUTO: 98.2 FL — SIGNIFICANT CHANGE UP (ref 80–100)
NRBC # BLD: 0 /100 WBCS — SIGNIFICANT CHANGE UP (ref 0–0)
PHOSPHATE SERPL-MCNC: 5 MG/DL — HIGH (ref 2.5–4.5)
PLATELET # BLD AUTO: 163 K/UL — SIGNIFICANT CHANGE UP (ref 150–400)
POTASSIUM SERPL-MCNC: 4.2 MMOL/L — SIGNIFICANT CHANGE UP (ref 3.5–5.3)
POTASSIUM SERPL-SCNC: 4.2 MMOL/L — SIGNIFICANT CHANGE UP (ref 3.5–5.3)
PROT SERPL-MCNC: 6.8 G/DL — SIGNIFICANT CHANGE UP (ref 6–8.3)
RBC # BLD: 3.27 M/UL — LOW (ref 4.2–5.8)
RBC # FLD: 13.2 % — SIGNIFICANT CHANGE UP (ref 10.3–14.5)
SODIUM SERPL-SCNC: 142 MMOL/L — SIGNIFICANT CHANGE UP (ref 135–145)
TIBC SERPL-MCNC: 220 UG/DL — SIGNIFICANT CHANGE UP (ref 220–430)
TRANSFERRIN SERPL-MCNC: 186 MG/DL — LOW (ref 200–360)
TROPONIN T, HIGH SENSITIVITY RESULT: 83 NG/L — HIGH (ref 0–51)
TROPONIN T, HIGH SENSITIVITY RESULT: 85 NG/L — HIGH (ref 0–51)
UIBC SERPL-MCNC: 165 UG/DL — SIGNIFICANT CHANGE UP (ref 110–370)
WBC # BLD: 6.52 K/UL — SIGNIFICANT CHANGE UP (ref 3.8–10.5)
WBC # FLD AUTO: 6.52 K/UL — SIGNIFICANT CHANGE UP (ref 3.8–10.5)

## 2022-05-18 PROCEDURE — 93306 TTE W/DOPPLER COMPLETE: CPT | Mod: 26

## 2022-05-18 PROCEDURE — 99232 SBSQ HOSP IP/OBS MODERATE 35: CPT | Mod: GC

## 2022-05-18 PROCEDURE — 93970 EXTREMITY STUDY: CPT | Mod: 26

## 2022-05-18 PROCEDURE — 99233 SBSQ HOSP IP/OBS HIGH 50: CPT

## 2022-05-18 RX ORDER — FUROSEMIDE 40 MG
40 TABLET ORAL
Refills: 0 | Status: DISCONTINUED | OUTPATIENT
Start: 2022-05-18 | End: 2022-05-19

## 2022-05-18 RX ADMIN — CARVEDILOL PHOSPHATE 12.5 MILLIGRAM(S): 80 CAPSULE, EXTENDED RELEASE ORAL at 05:24

## 2022-05-18 RX ADMIN — Medication 100 MILLIGRAM(S): at 17:54

## 2022-05-18 RX ADMIN — Medication 100 MILLIGRAM(S): at 05:24

## 2022-05-18 RX ADMIN — Medication 40 MILLIGRAM(S): at 05:24

## 2022-05-18 RX ADMIN — ISOSORBIDE MONONITRATE 30 MILLIGRAM(S): 60 TABLET, EXTENDED RELEASE ORAL at 13:37

## 2022-05-18 RX ADMIN — HEPARIN SODIUM 5000 UNIT(S): 5000 INJECTION INTRAVENOUS; SUBCUTANEOUS at 21:15

## 2022-05-18 RX ADMIN — CARVEDILOL PHOSPHATE 12.5 MILLIGRAM(S): 80 CAPSULE, EXTENDED RELEASE ORAL at 17:57

## 2022-05-18 RX ADMIN — HEPARIN SODIUM 5000 UNIT(S): 5000 INJECTION INTRAVENOUS; SUBCUTANEOUS at 13:38

## 2022-05-18 RX ADMIN — ATORVASTATIN CALCIUM 10 MILLIGRAM(S): 80 TABLET, FILM COATED ORAL at 21:15

## 2022-05-18 RX ADMIN — Medication 81 MILLIGRAM(S): at 13:37

## 2022-05-18 RX ADMIN — HEPARIN SODIUM 5000 UNIT(S): 5000 INJECTION INTRAVENOUS; SUBCUTANEOUS at 05:24

## 2022-05-18 RX ADMIN — Medication 175 MICROGRAM(S): at 05:35

## 2022-05-18 RX ADMIN — TAMSULOSIN HYDROCHLORIDE 0.4 MILLIGRAM(S): 0.4 CAPSULE ORAL at 21:15

## 2022-05-18 RX ADMIN — Medication 40 MILLIGRAM(S): at 17:56

## 2022-05-18 RX ADMIN — AMLODIPINE BESYLATE 5 MILLIGRAM(S): 2.5 TABLET ORAL at 05:24

## 2022-05-18 NOTE — PROGRESS NOTE ADULT - SUBJECTIVE AND OBJECTIVE BOX
[facility] DIVISION OF KIDNEY DISEASES AND HYPERTENSION -- FOLLOW UP NOTE  --------------------------------------------------------------------------------  HPI: 81 year old Male with significant PMH CKD stage 4, CAD (s/p MACI x 2 last in 2019), HFpEF (50% in 2020), DM2, HTN, HLD, and hypothyroidism presents to [facility] on 5/16 with chest heaviness, SOB, and fatigue and worsening LE swelling x 2 weeks.     Kidney History : Pt. is CKD stage 4 likely 2/2 DM, serological work up done outpatient was negative. Follows with Dr. Natalia Sharma. Last seen in November 2021. Pt. has been told that he will be needing hemodialysis for worsening kidney function and he refused in the past (as per outpatient chart). On review of [facility], it was noted that Scr was 4.44 on 11/5/21. Scr remained elevated at 3.97 on 2/18/22. Scr was elevated at 4.38 on admission. UA was WNL with spot urine TP/CR elevated to 1.3 on admission.     24 hour event:  No acute events overnight.   Pt. seen and examined at bedside. Currently on RA. Pt. reports improvement in  LE swelling. Denies SOB, CP, HA, N/V, abdominal pain , urinary symptoms or dizziness.     PAST HISTORY  --------------------------------------------------------------------------------  No significant changes to PMH, PSH, FHx, SHx, unless otherwise noted    ALLERGIES & MEDICATIONS  --------------------------------------------------------------------------------  Allergies    No Known Drug Allergies  shellfish (Vomiting)    Intolerances    Standing Inpatient Medications  amLODIPine   Tablet 5 milliGRAM(s) Oral daily  aspirin enteric coated 81 milliGRAM(s) Oral daily  atorvastatin 10 milliGRAM(s) Oral at bedtime  carvedilol 12.5 milliGRAM(s) Oral every 12 hours  dextrose 5%. 1000 milliLiter(s) IV Continuous <Continuous>  dextrose 5%. 1000 milliLiter(s) IV Continuous <Continuous>  dextrose 50% Injectable 25 Gram(s) IV Push once  dextrose 50% Injectable 12.5 Gram(s) IV Push once  dextrose 50% Injectable 25 Gram(s) IV Push once  furosemide   Injectable 40 milliGRAM(s) IV Push every 12 hours  glucagon  Injectable 1 milliGRAM(s) IntraMuscular once  heparin   Injectable 5000 Unit(s) SubCutaneous every 8 hours  hydrALAZINE 100 milliGRAM(s) Oral every 12 hours  insulin lispro (ADMELOG) corrective regimen sliding scale   SubCutaneous three times a day before meals  insulin lispro (ADMELOG) corrective regimen sliding scale   SubCutaneous at bedtime  isosorbide   mononitrate ER Tablet (IMDUR) 30 milliGRAM(s) Oral daily  levothyroxine 175 MICROGram(s) Oral daily  tamsulosin 0.4 milliGRAM(s) Oral at bedtime    PRN Inpatient Medications  dextrose Oral Gel 15 Gram(s) Oral once PRN    REVIEW OF SYSTEMS  --------------------------------------------------------------------------------  Gen: No fevers   Respiratory: No dyspnea  CV: No chest pain  GI: No abdominal pain  : No dysuria  MSK: B/L LE edema +  Neuro: no dizziness     VITALS/PHYSICAL EXAM  --------------------------------------------------------------------------------  T(C): 36.5 (05-18-22 @ 04:19), Max: 36.8 (05-17-22 @ 21:26)  HR: 61 (05-18-22 @ 04:19) (61 - 68)  BP: 132/78 (05-18-22 @ 04:19) (106/83 - 167/84)  RR: 18 (05-18-22 @ 04:19) (17 - 19)  SpO2: 97% (05-18-22 @ 04:19) (92% - 97%)  Wt(kg): --  Height (cm): 180.3 (05-16-22 @ 15:21)  Weight (kg): 113 (05-17-22 @ 00:13)  BMI (kg/m2): 34.8 (05-17-22 @ 00:13)  BSA (m2): 2.31 (05-17-22 @ 00:13)    05-17-22 @ 07:01  -  05-18-22 @ 07:00  --------------------------------------------------------  IN: 390 mL / OUT: 200 mL / NET: 190 mL    Physical Exam:  	Gen: NAD on RA  	HEENT: MMM  	Pulm: CTA B/L,   	CV: S1S2  	Abd: Soft, +BS   	Ext: B/L LE edema +  	Neuro: Awake  	Skin: Warm and dry    LABS/STUDIES  --------------------------------------------------------------------------------              10.3   6.52  >-----------<  163      [05-18-22 @ 07:26]              32.1     141  |  104  |  76  ----------------------------<  100      [05-17-22 @ 06:37]  4.0   |  19  |  4.27        Ca     9.4     [05-17-22 @ 06:37]      Mg     2.2     [05-17-22 @ 06:37]      Phos  4.0     [05-17-22 @ 06:37]    TPro  7.6  /  Alb  4.0  /  TBili  0.2  /  DBili  x   /  AST  20  /  ALT  15  /  AlkPhos  125  [05-16-22 @ 19:19]    PT/INR: PT 12.4 , INR 1.08       [05-17-22 @ 00:24]  PTT: 28.7       [05-17-22 @ 00:24]          [05-16-22 @ 22:31]    Creatinine Trend:  SCr 4.27 [05-17 @ 06:37]  SCr 4.38 [05-16 @ 19:19]    Urinalysis - [05-16-22 @ 23:39]      Color Light Yellow / Appearance Clear / SG 1.012 / pH 5.5      Gluc Negative / Ketone Negative  / Bili Negative / Urobili Negative       Blood Negative / Protein 100 / Leuk Est Negative / Nitrite Negative      RBC 0 / WBC 0 / Hyaline 0 / Gran  / Sq Epi  / Non Sq Epi 0 / Bacteria Negative    Urine Creatinine 69      [05-16-22 @ 23:39]  Urine Protein 92      [05-16-22 @ 23:39]  Urine Sodium 61      [05-16-22 @ 23:39]  Urine Urea Nitrogen 467      [05-16-22 @ 23:39]  Urine Potassium 38      [05-16-22 @ 23:39]  Urine Chloride 50      [05-16-22 @ 23:39]  Urine Osmolality 367      [05-16-22 @ 23:39]

## 2022-05-18 NOTE — PROGRESS NOTE ADULT - PROBLEM SELECTOR PLAN 2
Pt. with anemia in the setting of advanced CKD. Check iron studies. Monitor Hb daily for now.       If you have any questions, please feel free to contact me  Luis Alfredo Maguire  Nephrology Fellow  [phone]/ [employer] Teams(Preferred)  (After 5pm or on weekends please page the on-call fellow)

## 2022-05-18 NOTE — PROGRESS NOTE ADULT - SUBJECTIVE AND OBJECTIVE BOX
PROGRESS NOTE:     CONTACT INFO:  Amadou Hill MD | PGY-1  Pager: [phone] [place], [address] [facility]  Also on TEAMS  After 7pm page night float  On weekends after 1pm check resident assignment tab to see who is covering      Patient is a 81y old  Male who presents with a chief complaint of ALDEN on CKD and possible CHF exacerbation (17 May 2022 07:00)      SUBJECTIVE / OVERNIGHT EVENTS:    - No acute events overnight.   - No fevers/chills.  - Patient seen and evaluated at bedside.  - Denies SOB at rest, chest pain, palpitations, abdominal pain, nausea/vomiting    ADDITIONAL REVIEW OF SYSTEMS:    MEDICATIONS  (STANDING):  amLODIPine   Tablet 5 milliGRAM(s) Oral daily  aspirin enteric coated 81 milliGRAM(s) Oral daily  atorvastatin 10 milliGRAM(s) Oral at bedtime  carvedilol 12.5 milliGRAM(s) Oral every 12 hours  dextrose 5%. 1000 milliLiter(s) (100 mL/Hr) IV Continuous <Continuous>  dextrose 5%. 1000 milliLiter(s) (50 mL/Hr) IV Continuous <Continuous>  dextrose 50% Injectable 25 Gram(s) IV Push once  dextrose 50% Injectable 12.5 Gram(s) IV Push once  dextrose 50% Injectable 25 Gram(s) IV Push once  furosemide   Injectable 40 milliGRAM(s) IV Push every 12 hours  glucagon  Injectable 1 milliGRAM(s) IntraMuscular once  heparin   Injectable 5000 Unit(s) SubCutaneous every 8 hours  hydrALAZINE 100 milliGRAM(s) Oral every 12 hours  insulin lispro (ADMELOG) corrective regimen sliding scale   SubCutaneous three times a day before meals  insulin lispro (ADMELOG) corrective regimen sliding scale   SubCutaneous at bedtime  isosorbide   mononitrate ER Tablet (IMDUR) 30 milliGRAM(s) Oral daily  levothyroxine 175 MICROGram(s) Oral daily  tamsulosin 0.4 milliGRAM(s) Oral at bedtime    MEDICATIONS  (PRN):  dextrose Oral Gel 15 Gram(s) Oral once PRN Blood Glucose LESS THAN 70 milliGRAM(s)/deciliter      CAPILLARY BLOOD GLUCOSE      POCT Blood Glucose.: 142 mg/dL (17 May 2022 21:54)  POCT Blood Glucose.: 110 mg/dL (17 May 2022 17:12)  POCT Blood Glucose.: 101 mg/dL (17 May 2022 13:25)  POCT Blood Glucose.: 103 mg/dL (17 May 2022 09:35)    I&O's Summary    16 May 2022 07:01  -  17 May 2022 07:00  --------------------------------------------------------  IN: 0 mL / OUT: 900 mL / NET: -900 mL    17 May 2022 07:01  -  18 May 2022 06:55  --------------------------------------------------------  IN: 390 mL / OUT: 200 mL / NET: 190 mL        PHYSICAL EXAM:  Vital Signs Last 24 Hrs  T(C): 36.5 (18 May 2022 04:19), Max: 36.8 (17 May 2022 21:26)  T(F): 97.7 (18 May 2022 04:19), Max: 98.3 (17 May 2022 21:26)  HR: 61 (18 May 2022 04:19) (61 - 68)  BP: 132/78 (18 May 2022 04:19) (106/83 - 167/84)  BP(mean): --  RR: 18 (18 May 2022 04:19) (17 - 19)  SpO2: 97% (18 May 2022 04:19) (92% - 97%)    CONSTITUTIONAL: NAD, well-developed  RESPIRATORY: Normal respiratory effort; lungs are clear to auscultation bilaterally  CARDIOVASCULAR: Regular rate and rhythm, normal S1 and S2, no murmur/rub/gallop; No lower extremity edema; Peripheral pulses are 2+ bilaterally  ABDOMEN: Nontender to palpation, normoactive bowel sounds, no rebound/guarding; No hepatosplenomegaly  MUSCULOSKELETAL: no clubbing or cyanosis of digits; no joint swelling or tenderness to palpation  PSYCH: A+O to person, place, and time; affect appropriate    LABS:                        10.1   7.98  )-----------( 156      ( 17 May 2022 06:37 )             32.0     05-17    141  |  104  |  76<H>  ----------------------------<  100<H>  4.0   |  19<L>  |  4.27<H>    Ca    9.4      17 May 2022 06:37  Phos  4.0       Mg     2.2         TPro  7.6  /  Alb  4.0  /  TBili  0.2  /  DBili  x   /  AST  20  /  ALT  15  /  AlkPhos  125<H>  05-    PT/INR - ( 17 May 2022 00:24 )   PT: 12.4 sec;   INR: 1.08 ratio         PTT - ( 17 May 2022 00:24 )  PTT:28.7 sec  CARDIAC MARKERS ( 16 May 2022 22:31 )  x     / x     / 118 U/L / x     / 3.8 ng/mL      Urinalysis Basic - ( 16 May 2022 23:39 )    Color: Light Yellow / Appearance: Clear / S.012 / pH: x  Gluc: x / Ketone: Negative  / Bili: Negative / Urobili: Negative   Blood: x / Protein: 100 / Nitrite: Negative   Leuk Esterase: Negative / RBC: 0 /hpf / WBC 0 /HPF   Sq Epi: x / Non Sq Epi: 0 /hpf / Bacteria: Negative          RADIOLOGY & ADDITIONAL TESTS:  Results Reviewed:   Imaging Personally Reviewed:  Electrocardiogram Personally Reviewed:    COORDINATION OF CARE:  Care Discussed with Consultants/Other Providers [Y/N]: Y  Prior or Outpatient Records Reviewed [Y/N]: Y   PROGRESS NOTE:     CONTACT INFO:  Amadou Hill MD | PGY-1  Pager: [phone] [place], [address] [facility]  Also on TEAMS  After 7pm page night float  On weekends after 1pm check resident assignment tab to see who is covering      Patient is a 81y old  Male who presents with a chief complaint of ALDEN on CKD and possible CHF exacerbation (17 May 2022 07:00)      SUBJECTIVE / OVERNIGHT EVENTS:    - No acute events overnight.   - No fevers/chills.  - Patient seen and evaluated at bedside.  - Denies SOB at rest, chest pain, palpitations, abdominal pain, nausea/vomiting    ADDITIONAL REVIEW OF SYSTEMS:    MEDICATIONS  (STANDING):  amLODIPine   Tablet 5 milliGRAM(s) Oral daily  aspirin enteric coated 81 milliGRAM(s) Oral daily  atorvastatin 10 milliGRAM(s) Oral at bedtime  carvedilol 12.5 milliGRAM(s) Oral every 12 hours  dextrose 5%. 1000 milliLiter(s) (100 mL/Hr) IV Continuous <Continuous>  dextrose 5%. 1000 milliLiter(s) (50 mL/Hr) IV Continuous <Continuous>  dextrose 50% Injectable 25 Gram(s) IV Push once  dextrose 50% Injectable 12.5 Gram(s) IV Push once  dextrose 50% Injectable 25 Gram(s) IV Push once  furosemide   Injectable 40 milliGRAM(s) IV Push every 12 hours  glucagon  Injectable 1 milliGRAM(s) IntraMuscular once  heparin   Injectable 5000 Unit(s) SubCutaneous every 8 hours  hydrALAZINE 100 milliGRAM(s) Oral every 12 hours  insulin lispro (ADMELOG) corrective regimen sliding scale   SubCutaneous three times a day before meals  insulin lispro (ADMELOG) corrective regimen sliding scale   SubCutaneous at bedtime  isosorbide   mononitrate ER Tablet (IMDUR) 30 milliGRAM(s) Oral daily  levothyroxine 175 MICROGram(s) Oral daily  tamsulosin 0.4 milliGRAM(s) Oral at bedtime    MEDICATIONS  (PRN):  dextrose Oral Gel 15 Gram(s) Oral once PRN Blood Glucose LESS THAN 70 milliGRAM(s)/deciliter      CAPILLARY BLOOD GLUCOSE      POCT Blood Glucose.: 142 mg/dL (17 May 2022 21:54)  POCT Blood Glucose.: 110 mg/dL (17 May 2022 17:12)  POCT Blood Glucose.: 101 mg/dL (17 May 2022 13:25)  POCT Blood Glucose.: 103 mg/dL (17 May 2022 09:35)    I&O's Summary    16 May 2022 07:01  -  17 May 2022 07:00  --------------------------------------------------------  IN: 0 mL / OUT: 900 mL / NET: -900 mL    17 May 2022 07:01  -  18 May 2022 06:55  --------------------------------------------------------  IN: 390 mL / OUT: 200 mL / NET: 190 mL        PHYSICAL EXAM:  Vital Signs Last 24 Hrs  T(C): 36.5 (18 May 2022 04:19), Max: 36.8 (17 May 2022 21:26)  T(F): 97.7 (18 May 2022 04:19), Max: 98.3 (17 May 2022 21:26)  HR: 61 (18 May 2022 04:19) (61 - 68)  BP: 132/78 (18 May 2022 04:19) (106/83 - 167/84)  BP(mean): --  RR: 18 (18 May 2022 04:19) (17 - 19)  SpO2: 97% (18 May 2022 04:19) (92% - 97%)    CONSTITUTIONAL: Fatigue, well-developed, no weight loss  RESPIRATORY: Normal respiratory effort; lungs are clear to auscultation bilaterally, dry cough  CARDIOVASCULAR: Regular rate and rhythm, normal S1 and S2, no murmur/rub/gallop; lower extremity edema improving; Peripheral pulses are 2+ bilaterally  ABDOMEN: Nontender to palpation, normoactive bowel sounds, no rebound/guarding; No hepatosplenomegaly  MUSCULOSKELETAL: no clubbing or cyanosis of digits; no joint swelling or tenderness to palpation, back pain, LE edema, R >L   PSYCH: A+O to person, place, and time; affect appropriate  SKIN: venous stasis dermatitis bilaterally      LABS:                        10.1   7.98  )-----------( 156      ( 17 May 2022 06:37 )             32.0         141  |  104  |  76<H>  ----------------------------<  100<H>  4.0   |  19<L>  |  4.27<H>    Ca    9.4      17 May 2022 06:37  Phos  4.0       Mg     2.2         TPro  7.6  /  Alb  4.0  /  TBili  0.2  /  DBili  x   /  AST  20  /  ALT  15  /  AlkPhos  125<H>  -    PT/INR - ( 17 May 2022 00:24 )   PT: 12.4 sec;   INR: 1.08 ratio         PTT - ( 17 May 2022 00:24 )  PTT:28.7 sec  CARDIAC MARKERS ( 16 May 2022 22:31 )  x     / x     / 118 U/L / x     / 3.8 ng/mL      Urinalysis Basic - ( 16 May 2022 23:39 )    Color: Light Yellow / Appearance: Clear / S.012 / pH: x  Gluc: x / Ketone: Negative  / Bili: Negative / Urobili: Negative   Blood: x / Protein: 100 / Nitrite: Negative   Leuk Esterase: Negative / RBC: 0 /hpf / WBC 0 /HPF   Sq Epi: x / Non Sq Epi: 0 /hpf / Bacteria: Negative          RADIOLOGY & ADDITIONAL TESTS:  Results Reviewed:   Imaging Personally Reviewed:  Electrocardiogram Personally Reviewed:    COORDINATION OF CARE:  Care Discussed with Consultants/Other Providers [Y/N]: Y  Prior or Outpatient Records Reviewed [Y/N]: Y

## 2022-05-18 NOTE — PROGRESS NOTE ADULT - PROBLEM SELECTOR PLAN 1
EKG NSR no ST elevations or TWI   trops 62-->65  Could be stable angina     - Ordered TTE  - admit tele  - trend trops  - Can get nuclear stress test at some point either during this hospitalization or outpatient EKG NSR no ST elevations or TWI   trops 62-->65  Could be stable angina     - f/u TTE  - admit tele  - trops peaked   - Can get nuclear stress test if TTE abnormal either during this hospitalization or outpatient

## 2022-05-18 NOTE — PROGRESS NOTE ADULT - PROBLEM SELECTOR PLAN 1
Pt. with CKD stage 4 in the setting of likely DM. On review of [facility], it was noted that Scr was 4.44 on 11/5/21. Scr remained elevated at 3.97 on 2/18/22. Scr was elevated at 4.38 on admission. Scr stable at 4.27 today. UA was WNL with spot urine TP/CR elevated to 1.3 on admission. Pt. has been told that he will be needing hemodialysis for worsening kidney function and he refused in the past (as per outpatient chart). Received lasix 40 mg IV once in ER.   Pt. still clinically in vol overload. Agree with continuing lasix 40 mg IV BID. Pt. has history of BPH, will need frequent bladder scan to rule retention. Monitor labs and urine output. Avoid any potential nephrotoxins. Dose medications as per eGFR.

## 2022-05-18 NOTE — PROGRESS NOTE ADULT - PROBLEM SELECTOR PLAN 2
Worsening CKD or ALDEN/CKD   CKD 4   BUN 80, SCr 4.38 (baseline ~4)  Follows with  outpatient   CKD stage 4 likely 2/2 diabetic nephropathy   On 40 PO lasix bid at home   Endorses some SOB, and chest heaviness  No signs of uremia, no electrolyte derangements   s/p 40IV lasix with urine output   FeNa 2.8  UA pretty bland with urine protein/Cr 1.3  CXR clear     - Nephrology consulted, appreciate recs   - Feurea 37.1 suggesting intrinsic renal disease  - f/u renal U/S to r/o hydronephrosis   - Strict I/O   - c/w Lasix 40mg IV bid   - avoid nephrotoxic agents SCr 4.38 (baseline ~4)  Follows with  outpatient   CKD stage 4 likely 2/2 diabetic nephropathy   On 40 PO lasix bid at home   s/p 40IV lasix with urine output   FeNa 2.8  UA pretty bland with urine protein/Cr 1.3  CXR clear     - Nephrology consulted, appreciate recs   - Fe urea 37.1 suggesting intrinsic renal disease  - Renal U/S r/o hydronephrosis   - Strict I/O   - c/w Lasix 40mg IV bid   - avoid nephrotoxic agents SCr 4.38 (baseline ~4)  Follows with  outpatient   CKD stage 4 likely 2/2 diabetic nephropathy   On 40 PO lasix bid at home   s/p 40IV lasix with urine output   FeNa 2.8  UA pretty bland with urine protein/Cr 1.3  CXR clear     - Nephrology consulted, appreciate recs   - Fe urea 37.1 suggesting intrinsic renal disease  - Renal U/S r/o hydronephrosis   - Strict I/O   - iron studies wnl   - c/w Lasix 40mg IV bid   - avoid nephrotoxic agents

## 2022-05-18 NOTE — PROGRESS NOTE ADULT - PROBLEM SELECTOR PLAN 3
Home meds: Coreg 12.5mg bid, Statin, Lasix 40mg bid, Lisinopril 10  HFpEF 20 % in 2020   Endorses SOB and LE swelling   Could be CHF exacerbation causing sx    - c/w Coreg  - c/w Statin  - Lasix 40mg IV bid for diuresis   - hold Lisinopril   - TTE Home meds: Coreg 12.5mg bid, Statin, Lasix 40mg bid, Lisinopril 10  HFpEF 20 % in 2020   Endorses SOB and LE swelling   Could be CHF exacerbation causing sx    - c/w Coreg  - c/w Statin  - Lasix 40mg IV bid for diuresis   - hold Lisinopril   - f/u TTE

## 2022-05-18 NOTE — PROGRESS NOTE ADULT - PROBLEM SELECTOR PLAN 4
R>L   Likely in setting of HF and CKD  - f/u LE dopplers to rule out DVT  - diuresis as above R>L   Likely in setting of HF and CKD  - improving   - f/u LE dopplers to rule out DVT  - diuresis as above

## 2022-05-19 ENCOUNTER — TRANSCRIPTION ENCOUNTER (OUTPATIENT)
Age: 82
End: 2022-05-19

## 2022-05-19 LAB
ALBUMIN SERPL ELPH-MCNC: 3.3 G/DL — SIGNIFICANT CHANGE UP (ref 3.3–5)
ALP SERPL-CCNC: 104 U/L — SIGNIFICANT CHANGE UP (ref 40–120)
ALT FLD-CCNC: 10 U/L — SIGNIFICANT CHANGE UP (ref 10–45)
ANION GAP SERPL CALC-SCNC: 17 MMOL/L — SIGNIFICANT CHANGE UP (ref 5–17)
AST SERPL-CCNC: 18 U/L — SIGNIFICANT CHANGE UP (ref 10–40)
BILIRUB SERPL-MCNC: 0.2 MG/DL — SIGNIFICANT CHANGE UP (ref 0.2–1.2)
BUN SERPL-MCNC: 97 MG/DL — HIGH (ref 7–23)
CALCIUM SERPL-MCNC: 8.9 MG/DL — SIGNIFICANT CHANGE UP (ref 8.4–10.5)
CHLORIDE SERPL-SCNC: 103 MMOL/L — SIGNIFICANT CHANGE UP (ref 96–108)
CO2 SERPL-SCNC: 18 MMOL/L — LOW (ref 22–31)
CREAT SERPL-MCNC: 4.95 MG/DL — HIGH (ref 0.5–1.3)
EGFR: 11 ML/MIN/1.73M2 — LOW
GLUCOSE BLDC GLUCOMTR-MCNC: 110 MG/DL — HIGH (ref 70–99)
GLUCOSE BLDC GLUCOMTR-MCNC: 115 MG/DL — HIGH (ref 70–99)
GLUCOSE BLDC GLUCOMTR-MCNC: 121 MG/DL — HIGH (ref 70–99)
GLUCOSE SERPL-MCNC: 105 MG/DL — HIGH (ref 70–99)
HCT VFR BLD CALC: 30.6 % — LOW (ref 39–50)
HGB BLD-MCNC: 9.9 G/DL — LOW (ref 13–17)
MAGNESIUM SERPL-MCNC: 2.3 MG/DL — SIGNIFICANT CHANGE UP (ref 1.6–2.6)
MCHC RBC-ENTMCNC: 31.6 PG — SIGNIFICANT CHANGE UP (ref 27–34)
MCHC RBC-ENTMCNC: 32.4 GM/DL — SIGNIFICANT CHANGE UP (ref 32–36)
MCV RBC AUTO: 97.8 FL — SIGNIFICANT CHANGE UP (ref 80–100)
NRBC # BLD: 0 /100 WBCS — SIGNIFICANT CHANGE UP (ref 0–0)
PHOSPHATE SERPL-MCNC: 5 MG/DL — HIGH (ref 2.5–4.5)
PLATELET # BLD AUTO: 152 K/UL — SIGNIFICANT CHANGE UP (ref 150–400)
POTASSIUM SERPL-MCNC: 4.2 MMOL/L — SIGNIFICANT CHANGE UP (ref 3.5–5.3)
POTASSIUM SERPL-SCNC: 4.2 MMOL/L — SIGNIFICANT CHANGE UP (ref 3.5–5.3)
PROT SERPL-MCNC: 6.4 G/DL — SIGNIFICANT CHANGE UP (ref 6–8.3)
RBC # BLD: 3.13 M/UL — LOW (ref 4.2–5.8)
RBC # FLD: 13.2 % — SIGNIFICANT CHANGE UP (ref 10.3–14.5)
SODIUM SERPL-SCNC: 138 MMOL/L — SIGNIFICANT CHANGE UP (ref 135–145)
WBC # BLD: 6.81 K/UL — SIGNIFICANT CHANGE UP (ref 3.8–10.5)
WBC # FLD AUTO: 6.81 K/UL — SIGNIFICANT CHANGE UP (ref 3.8–10.5)

## 2022-05-19 PROCEDURE — 99232 SBSQ HOSP IP/OBS MODERATE 35: CPT | Mod: GC

## 2022-05-19 PROCEDURE — 99232 SBSQ HOSP IP/OBS MODERATE 35: CPT

## 2022-05-19 RX ORDER — FUROSEMIDE 40 MG
80 TABLET ORAL EVERY 12 HOURS
Refills: 0 | Status: DISCONTINUED | OUTPATIENT
Start: 2022-05-19 | End: 2022-05-20

## 2022-05-19 RX ADMIN — Medication 81 MILLIGRAM(S): at 11:53

## 2022-05-19 RX ADMIN — TAMSULOSIN HYDROCHLORIDE 0.4 MILLIGRAM(S): 0.4 CAPSULE ORAL at 21:14

## 2022-05-19 RX ADMIN — AMLODIPINE BESYLATE 5 MILLIGRAM(S): 2.5 TABLET ORAL at 06:42

## 2022-05-19 RX ADMIN — Medication 40 MILLIGRAM(S): at 06:54

## 2022-05-19 RX ADMIN — ATORVASTATIN CALCIUM 10 MILLIGRAM(S): 80 TABLET, FILM COATED ORAL at 21:14

## 2022-05-19 RX ADMIN — CARVEDILOL PHOSPHATE 12.5 MILLIGRAM(S): 80 CAPSULE, EXTENDED RELEASE ORAL at 06:42

## 2022-05-19 RX ADMIN — HEPARIN SODIUM 5000 UNIT(S): 5000 INJECTION INTRAVENOUS; SUBCUTANEOUS at 06:42

## 2022-05-19 RX ADMIN — Medication 175 MICROGRAM(S): at 06:42

## 2022-05-19 RX ADMIN — HEPARIN SODIUM 5000 UNIT(S): 5000 INJECTION INTRAVENOUS; SUBCUTANEOUS at 13:19

## 2022-05-19 RX ADMIN — ISOSORBIDE MONONITRATE 30 MILLIGRAM(S): 60 TABLET, EXTENDED RELEASE ORAL at 11:54

## 2022-05-19 RX ADMIN — CARVEDILOL PHOSPHATE 12.5 MILLIGRAM(S): 80 CAPSULE, EXTENDED RELEASE ORAL at 17:02

## 2022-05-19 RX ADMIN — HEPARIN SODIUM 5000 UNIT(S): 5000 INJECTION INTRAVENOUS; SUBCUTANEOUS at 21:14

## 2022-05-19 RX ADMIN — Medication 100 MILLIGRAM(S): at 17:02

## 2022-05-19 RX ADMIN — Medication 80 MILLIGRAM(S): at 17:03

## 2022-05-19 RX ADMIN — Medication 100 MILLIGRAM(S): at 06:42

## 2022-05-19 NOTE — DISCHARGE NOTE PROVIDER - NSDCCPCAREPLAN_GEN_ALL_CORE_FT
PRINCIPAL DISCHARGE DIAGNOSIS  Diagnosis: Shortness of breath  Assessment and Plan of Treatment:       SECONDARY DISCHARGE DIAGNOSES  Diagnosis: Acute kidney injury superimposed on CKD  Assessment and Plan of Treatment:      PRINCIPAL DISCHARGE DIAGNOSIS  Diagnosis: Shortness of breath  Assessment and Plan of Treatment: You came to the hospital with shortness of breath and some chest pain for several weeks. You were being followed by  as your kidney doctor. According to our kidney doctors, you were taking lasix 40mg twice a day. While in the hospital a scan was taken of your heart in order to elucidate heart functionality. Results were normal. An ultrasound of your kidneys and lower extremities was also acquired. Results were normal as well. Your lasix usage was increased to 80mg twice a day and this is the amount that you will be discharged on.   YOU WILL BE CONTACTED BY [facility] TO SCHEDULE AN APPOINTMENT. PLEASE SEE YOUR NEPHROLOGIST WITHIN ONE WEEK AFTER LEAVING THE HOSPITAL.      SECONDARY DISCHARGE DIAGNOSES  Diagnosis: Acute kidney injury superimposed on CKD  Assessment and Plan of Treatment: You have a history of chronic kidney disease. You were treated with an increased dose of lasix while inpatient and recommended to maintain this dosage when you leave the hsopital. The possibility of dialysis was brought up with you and your family by our medical team but you declined dialysis at this time.   PLEASE FOLLOW UP WITH YOUR NEPHROLOGIST FOR FURTHER MANAGEMENT OF YOUR CHRONIC KIDNEY DISEASE.     PRINCIPAL DISCHARGE DIAGNOSIS  Diagnosis: Shortness of breath  Assessment and Plan of Treatment: You came to the hospital with shortness of breath and some chest pain for several weeks as well as leg swelling. We increased your lasix which has helped with these symptoms. Please continue taking lasix 80 mg twice daily as prescribed.   YOU WILL BE CONTACTED BY [facility] TO SCHEDULE AN APPOINTMENT. PLEASE SEE YOUR NEPHROLOGIST WITHIN ONE WEEK AFTER LEAVING THE HOSPITAL.      SECONDARY DISCHARGE DIAGNOSES  Diagnosis: Acute kidney injury superimposed on CKD  Assessment and Plan of Treatment: You have a history of chronic kidney disease. You were treated with an increased dose of lasix while inpatient and recommended to maintain this dosage when you leave the opital. The possibility of dialysis was brought up with you and your family by our medical team but you declined dialysis at this time.   PLEASE FOLLOW UP WITH YOUR NEPHROLOGIST FOR FURTHER MANAGEMENT OF YOUR CHRONIC KIDNEY DISEASE.

## 2022-05-19 NOTE — PROGRESS NOTE ADULT - PROBLEM SELECTOR PLAN 3
Home meds: Coreg 12.5mg bid, Statin, Lasix 40mg bid, Lisinopril 10  HFpEF 20 % in 2020   Endorses SOB and LE swelling   Could be CHF exacerbation causing sx    - c/w Coreg  - c/w Statin  - Lasix 40mg IV bid for diuresis   - hold Lisinopril   - f/u TTE Home meds: Coreg 12.5mg bid, Statin, Lasix 40mg bid, Lisinopril 10  HFpEF 20 % in 2020   Endorses SOB and LE swelling   Could be CHF exacerbation causing sx    - c/w Coreg  - c/w Statin  - Lasix 40mg IV bid for diuresis   - hold Lisinopril   - s/p TTE Home meds: Coreg 12.5mg bid, Statin, Lasix 40mg bid, Lisinopril 10  HFpEF 20 % in 2020   Endorses SOB and LE swelling   Could be CHF exacerbation causing sx    - c/w Coreg  - c/w Statin  - Lasix 80 mg IV bid for diuresis   - hold Lisinopril   - s/p TTE

## 2022-05-19 NOTE — PROGRESS NOTE ADULT - PROBLEM SELECTOR PLAN 2
Pt. with anemia in the setting of advanced CKD. Check iron studies. Monitor Hb daily for now.     If you have any questions, please feel free to contact me  Sammy Jang  Nephrology Fellow  [phone]; Prefer [employer] TEAMS  (After 5pm or on weekends please page the on-call fellow)

## 2022-05-19 NOTE — PROGRESS NOTE ADULT - PROBLEM SELECTOR PLAN 1
Pt. with CKD stage 4 in the setting of likely DM. On review of [facility], it was noted that Scr was 4.44 on 11/5/21. Scr remained elevated at 3.97 on 2/18/22. Scr was elevated at 4.38 on admission. Scr stable at 4.9 today. UA was WNL with spot urine TP/CR elevated to 1.3 on admission. Pt. has been told that he will be needing hemodialysis for worsening kidney function and he refused in the past (as per outpatient chart). Received Lasix 40 mg IV once in ER.     Pt. still clinically in vol overload. Agree with continuing Lasix 40 mg BID. Pt. has history of BPH, will need frequent bladder scan to rule retention. Monitor labs and urine output. Avoid any potential nephrotoxins. Dose medications as per eGFR.

## 2022-05-19 NOTE — DISCHARGE NOTE PROVIDER - CARE PROVIDER_API CALL
Natalia Sharma)  Internal Medicine; Nephrology  [id number] [place] [address]  Phone: [phone]  Fax: [phone]  Established Patient  Follow Up Time: 1 week

## 2022-05-19 NOTE — PROGRESS NOTE ADULT - PROBLEM SELECTOR PLAN 2
SCr 4.38 (baseline ~4)  Follows with  outpatient   CKD stage 4 likely 2/2 diabetic nephropathy   On 40 PO lasix bid at home   s/p 40IV lasix with urine output   FeNa 2.8  UA pretty bland with urine protein/Cr 1.3  CXR clear     - Nephrology consulted, appreciate recs   - Fe urea 37.1 suggesting intrinsic renal disease  - Renal U/S r/o hydronephrosis   - Strict I/O   - iron studies wnl   - c/w Lasix 40mg IV bid   - avoid nephrotoxic agents SCr 4.38 (baseline ~4)  Follows with  outpatient   CKD stage 4 likely 2/2 diabetic nephropathy   On 40 PO lasix bid at home   FeNa 2.8  UA pretty bland with urine protein/Cr 1.3  CXR clear     - Nephrology consulted, appreciate recs   - Fe urea 37.1 suggesting intrinsic renal disease  - Renal U/S negative for hydronephrosis   - Strict I/O   - iron studies wnl   - c/w Lasix 40mg IV bid   - avoid nephrotoxic agents SCr 4.38 (baseline ~4)  Follows with  outpatient   CKD stage 4 likely 2/2 diabetic nephropathy   On 40 PO lasix bid at home   FeNa 2.8  UA pretty bland with urine protein/Cr 1.3  CXR clear     - Nephrology consulted, appreciate recs   - Fe urea 37.1 suggesting intrinsic renal disease  - Renal U/S negative for hydronephrosis   - Strict I/O   - iron studies wnl   - increase Lasix 80mg IV bid   - avoid nephrotoxic agents

## 2022-05-19 NOTE — DISCHARGE NOTE PROVIDER - NSDCFUSCHEDAPPT_GEN_ALL_CORE_FT
[facility]  [address]  Scheduled Appointment: 06/22/2022

## 2022-05-19 NOTE — DISCHARGE NOTE PROVIDER - NSDCMRMEDTOKEN_GEN_ALL_CORE_FT
amLODIPine 5 mg oral tablet: 1 tab(s) orally once a day  aspirin 81 mg oral delayed release tablet: 1 tab(s) orally once a day  atorvastatin 10 mg oral tablet: 1 tab(s) orally once a day  carvedilol 12.5 mg oral tablet: 1 tab(s) orally every 12 hours  Flomax 0.4 mg oral capsule: 1 cap(s) orally once a day  hydrALAZINE 100 mg oral tablet: 1 tab(s) orally 2 times a day  isosorbide mononitrate 30 mg oral tablet, extended release: 1 tab(s) orally once a day  Lasix 40 mg oral tablet: 1 tab(s) orally once a day  levothyroxine 175 mcg (0.175 mg) oral tablet: 1 tab(s) orally once a day  lisinopril 10 mg oral tablet: 1 tab(s) orally 2 times a day  omeprazole 10 mg oral delayed release capsule: 1 cap(s) orally once a day  Tradjenta 5 mg oral tablet: 1 tab(s) orally once a day    amLODIPine 5 mg oral tablet: 1 tab(s) orally once a day  aspirin 81 mg oral delayed release tablet: 1 tab(s) orally once a day  atorvastatin 10 mg oral tablet: 1 tab(s) orally once a day  carvedilol 12.5 mg oral tablet: 1 tab(s) orally every 12 hours  Flomax 0.4 mg oral capsule: 1 cap(s) orally once a day  hydrALAZINE 100 mg oral tablet: 1 tab(s) orally 2 times a day  isosorbide mononitrate 30 mg oral tablet, extended release: 1 tab(s) orally once a day  Lasix 40 mg oral tablet: 2 tab(s) orally 2 times a day   levothyroxine 175 mcg (0.175 mg) oral tablet: 1 tab(s) orally once a day  lisinopril 10 mg oral tablet: 1 tab(s) orally 2 times a day  Tradjenta 5 mg oral tablet: 1 tab(s) orally once a day    amLODIPine 5 mg oral tablet: 1 tab(s) orally once a day  aspirin 81 mg oral delayed release tablet: 1 tab(s) orally once a day  atorvastatin 10 mg oral tablet: 1 tab(s) orally once a day  carvedilol 12.5 mg oral tablet: 1 tab(s) orally every 12 hours  Flomax 0.4 mg oral capsule: 1 cap(s) orally once a day  hydrALAZINE 100 mg oral tablet: 1 tab(s) orally 2 times a day  isosorbide mononitrate 30 mg oral tablet, extended release: 1 tab(s) orally once a day  Lasix 40 mg oral tablet: 2 tab(s) orally 2 times a day   levothyroxine 175 mcg (0.175 mg) oral tablet: 1 tab(s) orally once a day  lisinopril 10 mg oral tablet: 1 tab(s) orally 2 times a day  omeprazole 10 mg oral delayed release capsule: 1 cap(s) orally once a day  Tradjenta 5 mg oral tablet: 1 tab(s) orally once a day

## 2022-05-19 NOTE — PROGRESS NOTE ADULT - PROBLEM SELECTOR PLAN 4
R>L   Likely in setting of HF and CKD  - improving   - f/u LE dopplers to rule out DVT  - diuresis as above Likely in setting of HF and CKD  - improving   - LE dopplers: No evidence of deep venous thrombosis in either lower extremity.  - diuresis as above

## 2022-05-19 NOTE — DISCHARGE NOTE PROVIDER - HOSPITAL COURSE
81M PMH CAD (s/p MACI x 2 last in 2019), HFpEF (50% in 2020), DM2, HTN, HLD, CKD4 based on old labs, hypothyroidism presents with 2 weeks of chest heaviness. Reports sensation is different from his past MIs and is more associated with breathing than exertion. He also notes worsening QUIROS, orthopnea, and LE swelling. He reports  no missed doses of home medications. Follows with  Nephfrancine and Dr Rosado Cardiology fellow. Despite taking his home lasix dose he reports decreased urine output but is still making urine. Denies fever, chills, chest pain with exertion, SOB at rest, abdominal pain, N/V/D/C, dysuria, hematuria. Pt denies any relieving or exacerbating factors.   No recent decreased PO intake, no recent NSAID use, n/v, or diarrhea     	Meds: hydralazine 100 bid, lisinopril 10, coreg 12.5 bid, levothy 175mcg, omeprazole 20, norvasc 5, flomax .4, asa 81, lipitor 10, tradjenta 5, lasix 40 bid, isosorbide mono 30 daily.    Per outpatient nephrology notes, patient last saw nephrologist 2021, per documentation was on Lasix 80mg daily with plan to increase to Lasix 80mg bid. Nephrologist discussed dialysis with patient and family multiple times, but patient was not interested.      81M PMH CAD (s/p MACI x 2 last in 2019), HFpEF (50% in 2020), DM2, HTN, HLD, CKD4 based on old labs, hypothyroidism presents with 2 weeks of chest heaviness. Reports sensation was different from his past MIs and is more associated with breathing than exertion. He also notes worsening QUIROS, orthopnea, and LE swelling. He reports  no missed doses of home medications. The patient follows with  as his Nephrologist and Dr Rosado in cardiology. Despite taking his home lasix dose he reports decreased urine output but is still making urine. Denied fever, chills, chest pain with exertion, SOB at rest, abdominal pain, N/V/D/C, dysuria, hematuria. Patient denied any relieving or exacerbating factors. Patient denied recent decreased PO intake, no recent NSAID use, n/v, or diarrhea     Per outpatient nephrology notes, patient last saw nephrologist 2021, per documentation was on Lasix 80mg daily with plan to increase to Lasix 80mg bid. Nephrologists had discussed dialysis with patient and family multiple times, but patient was not interested.     Hospital course: The patient received an echo and a ultrasound of his abdomen and extremities. All were normal. The renal department was consulted and the patient was restarted on his diuretics with strict intake and output. The patient's diuretic dosage was increased to lasix 80mg twice a day usage. The patient will be discharged with the current dosage and instructed to follow up with  outpatient in one week.     The patient is hemodynamically stable for discharge.    81M PMH CAD (s/p MACI x 2 last in 2019), HFpEF (50% in 2020), DM2, HTN, HLD, CKD4 based on old labs, hypothyroidism presents with 2 weeks of chest heaviness. Reports sensation was different from his past MIs and is more associated with breathing than exertion. He also notes worsening QUIROS, orthopnea, and LE swelling. He reports  no missed doses of home medications. The patient follows with  as his Nephrologist and Dr Rosado in cardiology. Despite taking his home lasix dose he reports decreased urine output but is still making urine. Denied fever, chills, chest pain with exertion, SOB at rest, abdominal pain, N/V/D/C, dysuria, hematuria. Patient denied any relieving or exacerbating factors. Patient denied recent decreased PO intake, no recent NSAID use, n/v, or diarrhea     Per outpatient nephrology notes, patient last saw nephrologist 2021, per documentation was on Lasix 40mg daily with plan to increase to Lasix 80mg bid. Nephrologists had discussed dialysis with patient and family multiple times, but patient was not interested.     Hospital course: The patient received an echo and a ultrasound of his abdomen and extremities. All were normal. The renal department was consulted and the patient was restarted on his diuretics with strict intake and output. The patient's diuretic dosage was increased to lasix 80mg twice a day usage. The patient will be discharged with the current dosage and instructed to follow up with  outpatient in one week.     The patient is hemodynamically stable for discharge.

## 2022-05-19 NOTE — DISCHARGE NOTE PROVIDER - NSDCCPTREATMENT_GEN_ALL_CORE_FT
PRINCIPAL PROCEDURE  Procedure: Transthoracic echocardiography (TTE)  Findings and Treatment: Observations:  Mitral Valve: Normal mitral valve.  Aortic Valve/Aorta: Normal trileaflet aortic valve. Peak  transaortic valve gradient equals 12 mm Hg, mean  transaortic valve gradient equals 6 mm Hg, aortic valve  velocity time integral equals 35 cm. Peak left ventricular  outflow tract gradient equals 7 mm Hg, mean gradient is  equal to 4 mm Hg, LVOT velocity time integral equals 28 cm.  Aortic Root: 3.6 cm.  Left Atrium: Normal left atrium.  LA volume index = 31  cc/m2.  Left Ventricle: Mild segmental left ventricular systolic  dysfunction.  Mild inferior wall hypokinesis.  Overall LVEF  is preserved. No left ventricular thrombus. Mild septal  left ventricular hypertrophy. Mild diastolic dysfunction  (Stage I).  Right Heart: Normal right atrium. Normal right ventricular  size and function (TAPSE 2.4 cm) Normal tricuspid valve.  Minimal tricuspid regurgitation. Normal pulmonic valve.  Pericardium/Pleura: Normal pericardium with no pericardial  effusion.  Hemodynamic: Estimated right atrial pressure is 8 mm Hg.  Estimated right ventricular systolic pressure equals 33 mm  Hg, assuming right atrial pressure equals 8 mm Hg,  consistent with normal pulmonary pressures.  ------------------------------------------------------------------------  Conclusions:  1. Mild septal left ventricular hypertrophy.  2. Mild segmental left ventricular systolic dysfunction.  Mild inferior wall hypokinesis.  Overall LVEF is preserved.  No left ventricular thrombus.  3. Mild diastolic dysfunction (Stage I).  4. Normal right ventricular size and function (TAPSE 2.4  cm)        SECONDARY PROCEDURE  Procedure: KUB  Findings and Treatment: IMPRESSION:  No hydronephrosis or urolithiasis. Small cyst in each kidney.      Procedure: VS vein duplex scan BILAT LE  Findings and Treatment: RIGHT:  Normal compressibility of the RIGHT common femoral, femoral and popliteal   veins.  Doppler examination shows normal spontaneous and phasic flow.  No RIGHT calf vein thrombosis is detected.  LEFT:  Normal compressibility of the LEFT common femoral, femoral and popliteal   veins.  Doppler examination shows normal spontaneous and phasic flow.  No LEFT calf vein thrombosis is detected.  IMPRESSION:  No evidence of deep venous thrombosis in either lower extremity.

## 2022-05-19 NOTE — PROGRESS NOTE ADULT - PROBLEM SELECTOR PLAN 1
EKG NSR no ST elevations or TWI   trops 62-->65  Could be stable angina     - f/u TTE  - admit tele  - trops peaked   - Can get nuclear stress test if TTE abnormal either during this hospitalization or outpatient EKG NSR no ST elevations or TWI   trops 62-->65  Could be stable angina     - TTE with preserved LVEF   - trops peaked   - Can get nuclear stress test if TTE abnormal either during this hospitalization or outpatient

## 2022-05-19 NOTE — PROGRESS NOTE ADULT - SUBJECTIVE AND OBJECTIVE BOX
[facility] DIVISION OF KIDNEY DISEASES AND HYPERTENSION -- FOLLOW UP NOTE  --------------------------------------------------------------------------------  HPI: 81 year old Male with significant PMH CKD stage 4, CAD (s/p MACI x 2 last in 2019), HFpEF (50% in 2020), DM2, HTN, HLD, and hypothyroidism presents to [facility] on 5/16 with chest heaviness, SOB, and fatigue and worsening LE swelling x 2 weeks.     Kidney History : Pt. is CKD stage 4 likely 2/2 DM, serological work up done outpatient was negative. Follows with Dr. Natalia Sharma. Last seen in November 2021. Pt. has been told that he will be needing hemodialysis for worsening kidney function and he refused in the past (as per outpatient chart). On review of [facility], it was noted that Scr was 4.44 on 11/5/21. Scr remained elevated at 3.97 on 2/18/22. Scr was elevated at 4.38 on admission. UA was WNL with spot urine TP/CR elevated to 1.3 on admission.     24 hour event:  Pt. seen this AM, denies any acute complaints. No acute events overnight. Scr. stable at 4.9. Pt. jason any shortness of breath or worsening of lower extremity edema. Lasix was held yesterday.        PAST HISTORY  --------------------------------------------------------------------------------  No significant changes to PMH, PSH, FHx, SHx, unless otherwise noted    ALLERGIES & MEDICATIONS  --------------------------------------------------------------------------------  Allergies    No Known Drug Allergies  shellfish (Vomiting)    Intolerances    Standing Inpatient Medications  amLODIPine   Tablet 5 milliGRAM(s) Oral daily  aspirin enteric coated 81 milliGRAM(s) Oral daily  atorvastatin 10 milliGRAM(s) Oral at bedtime  carvedilol 12.5 milliGRAM(s) Oral every 12 hours  dextrose 5%. 1000 milliLiter(s) IV Continuous <Continuous>  dextrose 5%. 1000 milliLiter(s) IV Continuous <Continuous>  dextrose 50% Injectable 25 Gram(s) IV Push once  dextrose 50% Injectable 12.5 Gram(s) IV Push once  dextrose 50% Injectable 25 Gram(s) IV Push once  furosemide    Tablet 40 milliGRAM(s) Oral two times a day  glucagon  Injectable 1 milliGRAM(s) IntraMuscular once  heparin   Injectable 5000 Unit(s) SubCutaneous every 8 hours  hydrALAZINE 100 milliGRAM(s) Oral every 12 hours  insulin lispro (ADMELOG) corrective regimen sliding scale   SubCutaneous three times a day before meals  insulin lispro (ADMELOG) corrective regimen sliding scale   SubCutaneous at bedtime  isosorbide   mononitrate ER Tablet (IMDUR) 30 milliGRAM(s) Oral daily  levothyroxine 175 MICROGram(s) Oral daily  tamsulosin 0.4 milliGRAM(s) Oral at bedtime    PRN Inpatient Medications  dextrose Oral Gel 15 Gram(s) Oral once PRN      REVIEW OF SYSTEMS  --------------------------------------------------------------------------------  Gen: No fevers/chills  Head/Eyes/Ears: Normal hearing,   Respiratory: No dyspnea, cough  CV: No chest pain  GI: No abdominal pain, diarrhea  : No urinary changes  MSK: No edema    All other systems were reviewed and are negative, except as noted.    VITALS/PHYSICAL EXAM  --------------------------------------------------------------------------------  T(C): 36.6 (05-19-22 @ 04:44), Max: 36.8 (05-18-22 @ 11:12)  HR: 69 (05-19-22 @ 04:44) (67 - 73)  BP: 138/76 (05-19-22 @ 04:44) (104/64 - 138/76)  RR: 18 (05-19-22 @ 04:44) (18 - 18)  SpO2: 99% (05-19-22 @ 04:44) (95% - 99%)  Wt(kg): --        05-18-22 @ 07:01  -  05-19-22 @ 07:00  --------------------------------------------------------  IN: 1380 mL / OUT: 950 mL / NET: 430 mL    05-19-22 @ 07:01  -  05-19-22 @ 09:26  --------------------------------------------------------  IN: 0 mL / OUT: 350 mL / NET: -350 mL      Physical Exam:  	Gen: NAD on RA lying flat   	HEENT: MMM  	Pulm: CTA B/L,   	CV: S1S2  	Abd: Soft, +BS   	Ext: B/L LE edema +, No asterixis   	Neuro: Awake  	Skin: Warm and dry      LABS/STUDIES  --------------------------------------------------------------------------------              9.9    6.81  >-----------<  152      [05-19-22 @ 07:10]              30.6     138  |  103  |  97  ----------------------------<  105      [05-19-22 @ 07:17]  4.2   |  18  |  4.95        Ca     8.9     [05-19-22 @ 07:17]      Mg     2.3     [05-19-22 @ 07:17]      Phos  5.0     [05-19-22 @ 07:17]    TPro  6.4  /  Alb  3.3  /  TBili  0.2  /  DBili  x   /  AST  18  /  ALT  10  /  AlkPhos  104  [05-19-22 @ 07:17]    Creatinine Trend:  SCr 4.95 [05-19 @ 07:17]  SCr 4.84 [05-18 @ 07:18]  SCr 4.27 [05-17 @ 06:37]  SCr 4.38 [05-16 @ 19:19]    Urinalysis - [05-16-22 @ 23:39]      Color Light Yellow / Appearance Clear / SG 1.012 / pH 5.5      Gluc Negative / Ketone Negative  / Bili Negative / Urobili Negative       Blood Negative / Protein 100 / Leuk Est Negative / Nitrite Negative      RBC 0 / WBC 0 / Hyaline 0 / Gran  / Sq Epi  / Non Sq Epi 0 / Bacteria Negative    Urine Creatinine 69      [05-16-22 @ 23:39]  Urine Protein 92      [05-16-22 @ 23:39]  Urine Sodium 61      [05-16-22 @ 23:39]  Urine Urea Nitrogen 467      [05-16-22 @ 23:39]  Urine Potassium 38      [05-16-22 @ 23:39]  Urine Chloride 50      [05-16-22 @ 23:39]  Urine Osmolality 367      [05-16-22 @ 23:39]    Iron 55, TIBC 220, %sat 25      [05-18-22 @ 07:18]  Ferritin 151      [05-18-22 @ 07:26]  HbA1c 5.7      [02-12-20 @ 08:41]  TSH 0.16      [05-17-22 @ 06:37]

## 2022-05-19 NOTE — PROGRESS NOTE ADULT - SUBJECTIVE AND OBJECTIVE BOX
PROGRESS NOTE:     CONTACT INFO:  Amadou Hill MD | PGY-1  Pager: [phone] [place], [address] [facility]  Also on TEAMS  After 7pm page night float  On weekends after 1pm check resident assignment tab to see who is covering      Patient is a 81y old  Male who presents with a chief complaint of ALDEN on CKD and possible CHF exacerbation (18 May 2022 08:19)      SUBJECTIVE / OVERNIGHT EVENTS:    - No acute events overnight.   - No fevers/chills.  - Patient seen and evaluated at bedside.  - Denies SOB at rest, chest pain, palpitations, abdominal pain, nausea/vomiting    ADDITIONAL REVIEW OF SYSTEMS:    MEDICATIONS  (STANDING):  amLODIPine   Tablet 5 milliGRAM(s) Oral daily  aspirin enteric coated 81 milliGRAM(s) Oral daily  atorvastatin 10 milliGRAM(s) Oral at bedtime  carvedilol 12.5 milliGRAM(s) Oral every 12 hours  dextrose 5%. 1000 milliLiter(s) (100 mL/Hr) IV Continuous <Continuous>  dextrose 5%. 1000 milliLiter(s) (50 mL/Hr) IV Continuous <Continuous>  dextrose 50% Injectable 25 Gram(s) IV Push once  dextrose 50% Injectable 12.5 Gram(s) IV Push once  dextrose 50% Injectable 25 Gram(s) IV Push once  furosemide    Tablet 40 milliGRAM(s) Oral two times a day  glucagon  Injectable 1 milliGRAM(s) IntraMuscular once  heparin   Injectable 5000 Unit(s) SubCutaneous every 8 hours  hydrALAZINE 100 milliGRAM(s) Oral every 12 hours  insulin lispro (ADMELOG) corrective regimen sliding scale   SubCutaneous three times a day before meals  insulin lispro (ADMELOG) corrective regimen sliding scale   SubCutaneous at bedtime  isosorbide   mononitrate ER Tablet (IMDUR) 30 milliGRAM(s) Oral daily  levothyroxine 175 MICROGram(s) Oral daily  tamsulosin 0.4 milliGRAM(s) Oral at bedtime    MEDICATIONS  (PRN):  dextrose Oral Gel 15 Gram(s) Oral once PRN Blood Glucose LESS THAN 70 milliGRAM(s)/deciliter      CAPILLARY BLOOD GLUCOSE      POCT Blood Glucose.: 124 mg/dL (18 May 2022 21:23)  POCT Blood Glucose.: 106 mg/dL (18 May 2022 17:22)  POCT Blood Glucose.: 135 mg/dL (18 May 2022 12:16)  POCT Blood Glucose.: 112 mg/dL (18 May 2022 08:53)    I&O's Summary    18 May 2022 07:01  -  19 May 2022 07:00  --------------------------------------------------------  IN: 1380 mL / OUT: 950 mL / NET: 430 mL        PHYSICAL EXAM:  Vital Signs Last 24 Hrs  T(C): 36.6 (19 May 2022 04:44), Max: 36.8 (18 May 2022 11:12)  T(F): 97.9 (19 May 2022 04:44), Max: 98.2 (18 May 2022 11:12)  HR: 69 (19 May 2022 04:44) (67 - 73)  BP: 138/76 (19 May 2022 04:44) (104/64 - 138/76)  BP(mean): --  RR: 18 (19 May 2022 04:44) (18 - 18)  SpO2: 99% (19 May 2022 04:44) (95% - 99%)    CONSTITUTIONAL: NAD, well-developed  RESPIRATORY: Normal respiratory effort; lungs are clear to auscultation bilaterally  CARDIOVASCULAR: Regular rate and rhythm, normal S1 and S2, no murmur/rub/gallop; No lower extremity edema; Peripheral pulses are 2+ bilaterally  ABDOMEN: Nontender to palpation, normoactive bowel sounds, no rebound/guarding; No hepatosplenomegaly  MUSCULOSKELETAL: no clubbing or cyanosis of digits; no joint swelling or tenderness to palpation  PSYCH: A+O to person, place, and time; affect appropriate    LABS:                        10.3   6.52  )-----------( 163      ( 18 May 2022 07:26 )             32.1     05-18    142  |  104  |  90<H>  ----------------------------<  105<H>  4.2   |  16<L>  |  4.84<H>    Ca    9.5      18 May 2022 07:18  Phos  5.0     05-18  Mg     2.2     05-18    TPro  6.8  /  Alb  3.8  /  TBili  0.3  /  DBili  x   /  AST  20  /  ALT  12  /  AlkPhos  107  05-18                RADIOLOGY & ADDITIONAL TESTS:  Results Reviewed:   Imaging Personally Reviewed:  Electrocardiogram Personally Reviewed:    COORDINATION OF CARE:  Care Discussed with Consultants/Other Providers [Y/N]: Y  Prior or Outpatient Records Reviewed [Y/N]: Y   PROGRESS NOTE:     CONTACT INFO:  Amadou Hill MD | PGY-1  Pager: [phone] [place], [address] [facility]  Also on TEAMS  After 7pm page night float  On weekends after 1pm check resident assignment tab to see who is covering      Patient is a 81y old  Male who presents with a chief complaint of ALDEN on CKD and possible CHF exacerbation (18 May 2022 08:19)      SUBJECTIVE / OVERNIGHT EVENTS:    - No acute events overnight.   - No fevers/chills.  - Patient seen and evaluated at bedside.  - Denies SOB at rest, chest pain, palpitations, abdominal pain, nausea/vomiting    ADDITIONAL REVIEW OF SYSTEMS:    MEDICATIONS  (STANDING):  amLODIPine   Tablet 5 milliGRAM(s) Oral daily  aspirin enteric coated 81 milliGRAM(s) Oral daily  atorvastatin 10 milliGRAM(s) Oral at bedtime  carvedilol 12.5 milliGRAM(s) Oral every 12 hours  dextrose 5%. 1000 milliLiter(s) (100 mL/Hr) IV Continuous <Continuous>  dextrose 5%. 1000 milliLiter(s) (50 mL/Hr) IV Continuous <Continuous>  dextrose 50% Injectable 25 Gram(s) IV Push once  dextrose 50% Injectable 12.5 Gram(s) IV Push once  dextrose 50% Injectable 25 Gram(s) IV Push once  furosemide    Tablet 40 milliGRAM(s) Oral two times a day  glucagon  Injectable 1 milliGRAM(s) IntraMuscular once  heparin   Injectable 5000 Unit(s) SubCutaneous every 8 hours  hydrALAZINE 100 milliGRAM(s) Oral every 12 hours  insulin lispro (ADMELOG) corrective regimen sliding scale   SubCutaneous three times a day before meals  insulin lispro (ADMELOG) corrective regimen sliding scale   SubCutaneous at bedtime  isosorbide   mononitrate ER Tablet (IMDUR) 30 milliGRAM(s) Oral daily  levothyroxine 175 MICROGram(s) Oral daily  tamsulosin 0.4 milliGRAM(s) Oral at bedtime    MEDICATIONS  (PRN):  dextrose Oral Gel 15 Gram(s) Oral once PRN Blood Glucose LESS THAN 70 milliGRAM(s)/deciliter      CAPILLARY BLOOD GLUCOSE      POCT Blood Glucose.: 124 mg/dL (18 May 2022 21:23)  POCT Blood Glucose.: 106 mg/dL (18 May 2022 17:22)  POCT Blood Glucose.: 135 mg/dL (18 May 2022 12:16)  POCT Blood Glucose.: 112 mg/dL (18 May 2022 08:53)    I&O's Summary    18 May 2022 07:01  -  19 May 2022 07:00  --------------------------------------------------------  IN: 1380 mL / OUT: 950 mL / NET: 430 mL        PHYSICAL EXAM:  Vital Signs Last 24 Hrs  T(C): 36.6 (19 May 2022 04:44), Max: 36.8 (18 May 2022 11:12)  T(F): 97.9 (19 May 2022 04:44), Max: 98.2 (18 May 2022 11:12)  HR: 69 (19 May 2022 04:44) (67 - 73)  BP: 138/76 (19 May 2022 04:44) (104/64 - 138/76)  BP(mean): --  RR: 18 (19 May 2022 04:44) (18 - 18)  SpO2: 99% (19 May 2022 04:44) (95% - 99%)    CONSTITUTIONAL: well-developed, no weight loss  RESPIRATORY: Normal respiratory effort; lungs are clear to auscultation bilaterally  CARDIOVASCULAR: Regular rate and rhythm, normal S1 and S2, no murmur/rub/gallop; lower extremity edema improving; Peripheral pulses are 2+ bilaterally  ABDOMEN: Nontender to palpation, normoactive bowel sounds, no rebound/guarding; No hepatosplenomegaly  MUSCULOSKELETAL: no clubbing or cyanosis of digits; no joint swelling or tenderness to palpation, back pain, LE edema, R >L   PSYCH: A+O to person, place, and time; affect appropriate  SKIN: venous stasis dermatitis bilaterally    LABS:                        10.3   6.52  )-----------( 163      ( 18 May 2022 07:26 )             32.1     05-18    142  |  104  |  90<H>  ----------------------------<  105<H>  4.2   |  16<L>  |  4.84<H>    Ca    9.5      18 May 2022 07:18  Phos  5.0     05-18  Mg     2.2     05-18    TPro  6.8  /  Alb  3.8  /  TBili  0.3  /  DBili  x   /  AST  20  /  ALT  12  /  AlkPhos  107  05-18                RADIOLOGY & ADDITIONAL TESTS:  Results Reviewed:   Imaging Personally Reviewed:  Electrocardiogram Personally Reviewed:    COORDINATION OF CARE:  Care Discussed with Consultants/Other Providers [Y/N]: Y  Prior or Outpatient Records Reviewed [Y/N]: Y

## 2022-05-19 NOTE — PROGRESS NOTE ADULT - PROBLEM SELECTOR PLAN 5
On hydral 100 bid, Lisinopril 10, Coreg 12.5mg bid, amlodipine 5, Lasix 40mg bid, and Isosorbide mononitrate 30mg daily     - Continue Coreg, amlodipine, Isosorbide mononitrate 30mg daily   - Lasix IV 40 bid On hydral 100 bid, Lisinopril 10, Coreg 12.5mg bid, amlodipine 5, Lasix 40mg bid, and Isosorbide mononitrate 30mg daily     - Continue Coreg, amlodipine, Isosorbide mononitrate 30mg daily   - Lasix 40mg PO bid On hydral 100 bid, Lisinopril 10, Coreg 12.5mg bid, amlodipine 5, Lasix 40mg bid, and Isosorbide mononitrate 30mg daily     - Continue Coreg, amlodipine, Isosorbide mononitrate 30mg daily   - Lasix 80mg PO bid

## 2022-05-20 ENCOUNTER — TRANSCRIPTION ENCOUNTER (OUTPATIENT)
Age: 82
End: 2022-05-20

## 2022-05-20 VITALS
TEMPERATURE: 97 F | HEART RATE: 58 BPM | SYSTOLIC BLOOD PRESSURE: 123 MMHG | RESPIRATION RATE: 18 BRPM | OXYGEN SATURATION: 96 % | DIASTOLIC BLOOD PRESSURE: 74 MMHG

## 2022-05-20 DIAGNOSIS — E87.2 ACIDOSIS: ICD-10-CM

## 2022-05-20 LAB
ALBUMIN SERPL ELPH-MCNC: 3.6 G/DL — SIGNIFICANT CHANGE UP (ref 3.3–5)
ALP SERPL-CCNC: 107 U/L — SIGNIFICANT CHANGE UP (ref 40–120)
ALT FLD-CCNC: 16 U/L — SIGNIFICANT CHANGE UP (ref 10–45)
ANION GAP SERPL CALC-SCNC: 18 MMOL/L — HIGH (ref 5–17)
AST SERPL-CCNC: 26 U/L — SIGNIFICANT CHANGE UP (ref 10–40)
BILIRUB SERPL-MCNC: 0.2 MG/DL — SIGNIFICANT CHANGE UP (ref 0.2–1.2)
BUN SERPL-MCNC: 103 MG/DL — HIGH (ref 7–23)
CALCIUM SERPL-MCNC: 8.9 MG/DL — SIGNIFICANT CHANGE UP (ref 8.4–10.5)
CHLORIDE SERPL-SCNC: 105 MMOL/L — SIGNIFICANT CHANGE UP (ref 96–108)
CO2 SERPL-SCNC: 16 MMOL/L — LOW (ref 22–31)
CREAT SERPL-MCNC: 4.94 MG/DL — HIGH (ref 0.5–1.3)
EGFR: 11 ML/MIN/1.73M2 — LOW
GLUCOSE BLDC GLUCOMTR-MCNC: 119 MG/DL — HIGH (ref 70–99)
GLUCOSE BLDC GLUCOMTR-MCNC: 91 MG/DL — SIGNIFICANT CHANGE UP (ref 70–99)
GLUCOSE SERPL-MCNC: 99 MG/DL — SIGNIFICANT CHANGE UP (ref 70–99)
HCT VFR BLD CALC: 31.7 % — LOW (ref 39–50)
HGB BLD-MCNC: 10 G/DL — LOW (ref 13–17)
MAGNESIUM SERPL-MCNC: 2.2 MG/DL — SIGNIFICANT CHANGE UP (ref 1.6–2.6)
MCHC RBC-ENTMCNC: 31.3 PG — SIGNIFICANT CHANGE UP (ref 27–34)
MCHC RBC-ENTMCNC: 31.5 GM/DL — LOW (ref 32–36)
MCV RBC AUTO: 99.1 FL — SIGNIFICANT CHANGE UP (ref 80–100)
NRBC # BLD: 0 /100 WBCS — SIGNIFICANT CHANGE UP (ref 0–0)
PHOSPHATE SERPL-MCNC: 4.7 MG/DL — HIGH (ref 2.5–4.5)
PLATELET # BLD AUTO: 143 K/UL — LOW (ref 150–400)
POTASSIUM SERPL-MCNC: 4.2 MMOL/L — SIGNIFICANT CHANGE UP (ref 3.5–5.3)
POTASSIUM SERPL-SCNC: 4.2 MMOL/L — SIGNIFICANT CHANGE UP (ref 3.5–5.3)
PROT SERPL-MCNC: 6.5 G/DL — SIGNIFICANT CHANGE UP (ref 6–8.3)
RBC # BLD: 3.2 M/UL — LOW (ref 4.2–5.8)
RBC # FLD: 13.2 % — SIGNIFICANT CHANGE UP (ref 10.3–14.5)
SODIUM SERPL-SCNC: 139 MMOL/L — SIGNIFICANT CHANGE UP (ref 135–145)
WBC # BLD: 7.55 K/UL — SIGNIFICANT CHANGE UP (ref 3.8–10.5)
WBC # FLD AUTO: 7.55 K/UL — SIGNIFICANT CHANGE UP (ref 3.8–10.5)

## 2022-05-20 PROCEDURE — 83036 HEMOGLOBIN GLYCOSYLATED A1C: CPT

## 2022-05-20 PROCEDURE — 82565 ASSAY OF CREATININE: CPT

## 2022-05-20 PROCEDURE — 82550 ASSAY OF CK (CPK): CPT

## 2022-05-20 PROCEDURE — 87637 SARSCOV2&INF A&B&RSV AMP PRB: CPT

## 2022-05-20 PROCEDURE — 83735 ASSAY OF MAGNESIUM: CPT

## 2022-05-20 PROCEDURE — 71045 X-RAY EXAM CHEST 1 VIEW: CPT

## 2022-05-20 PROCEDURE — 80048 BASIC METABOLIC PNL TOTAL CA: CPT

## 2022-05-20 PROCEDURE — 99285 EMERGENCY DEPT VISIT HI MDM: CPT | Mod: 25

## 2022-05-20 PROCEDURE — 84133 ASSAY OF URINE POTASSIUM: CPT

## 2022-05-20 PROCEDURE — 93970 EXTREMITY STUDY: CPT

## 2022-05-20 PROCEDURE — 84100 ASSAY OF PHOSPHORUS: CPT

## 2022-05-20 PROCEDURE — 84156 ASSAY OF PROTEIN URINE: CPT

## 2022-05-20 PROCEDURE — 76770 US EXAM ABDO BACK WALL COMP: CPT

## 2022-05-20 PROCEDURE — 83550 IRON BINDING TEST: CPT

## 2022-05-20 PROCEDURE — 80053 COMPREHEN METABOLIC PANEL: CPT

## 2022-05-20 PROCEDURE — 99239 HOSP IP/OBS DSCHRG MGMT >30: CPT

## 2022-05-20 PROCEDURE — 83935 ASSAY OF URINE OSMOLALITY: CPT

## 2022-05-20 PROCEDURE — 99232 SBSQ HOSP IP/OBS MODERATE 35: CPT | Mod: GC

## 2022-05-20 PROCEDURE — 85014 HEMATOCRIT: CPT

## 2022-05-20 PROCEDURE — 82947 ASSAY GLUCOSE BLOOD QUANT: CPT

## 2022-05-20 PROCEDURE — 82728 ASSAY OF FERRITIN: CPT

## 2022-05-20 PROCEDURE — 97161 PT EVAL LOW COMPLEX 20 MIN: CPT

## 2022-05-20 PROCEDURE — 36415 COLL VENOUS BLD VENIPUNCTURE: CPT

## 2022-05-20 PROCEDURE — 83880 ASSAY OF NATRIURETIC PEPTIDE: CPT

## 2022-05-20 PROCEDURE — 82803 BLOOD GASES ANY COMBINATION: CPT

## 2022-05-20 PROCEDURE — 82962 GLUCOSE BLOOD TEST: CPT

## 2022-05-20 PROCEDURE — 84295 ASSAY OF SERUM SODIUM: CPT

## 2022-05-20 PROCEDURE — 82570 ASSAY OF URINE CREATININE: CPT

## 2022-05-20 PROCEDURE — 84132 ASSAY OF SERUM POTASSIUM: CPT

## 2022-05-20 PROCEDURE — 84443 ASSAY THYROID STIM HORMONE: CPT

## 2022-05-20 PROCEDURE — 82330 ASSAY OF CALCIUM: CPT

## 2022-05-20 PROCEDURE — 83605 ASSAY OF LACTIC ACID: CPT

## 2022-05-20 PROCEDURE — 84300 ASSAY OF URINE SODIUM: CPT

## 2022-05-20 PROCEDURE — 82436 ASSAY OF URINE CHLORIDE: CPT

## 2022-05-20 PROCEDURE — 85027 COMPLETE CBC AUTOMATED: CPT

## 2022-05-20 PROCEDURE — 84466 ASSAY OF TRANSFERRIN: CPT

## 2022-05-20 PROCEDURE — 85025 COMPLETE CBC W/AUTO DIFF WBC: CPT

## 2022-05-20 PROCEDURE — 85610 PROTHROMBIN TIME: CPT

## 2022-05-20 PROCEDURE — 81001 URINALYSIS AUTO W/SCOPE: CPT

## 2022-05-20 PROCEDURE — 82435 ASSAY OF BLOOD CHLORIDE: CPT

## 2022-05-20 PROCEDURE — 93306 TTE W/DOPPLER COMPLETE: CPT

## 2022-05-20 PROCEDURE — 84484 ASSAY OF TROPONIN QUANT: CPT

## 2022-05-20 PROCEDURE — 85730 THROMBOPLASTIN TIME PARTIAL: CPT

## 2022-05-20 PROCEDURE — 93005 ELECTROCARDIOGRAM TRACING: CPT

## 2022-05-20 PROCEDURE — 84540 ASSAY OF URINE/UREA-N: CPT

## 2022-05-20 PROCEDURE — 0225U NFCT DS DNA&RNA 21 SARSCOV2: CPT

## 2022-05-20 PROCEDURE — 85018 HEMOGLOBIN: CPT

## 2022-05-20 PROCEDURE — 93308 TTE F-UP OR LMTD: CPT

## 2022-05-20 PROCEDURE — 83540 ASSAY OF IRON: CPT

## 2022-05-20 PROCEDURE — 82553 CREATINE MB FRACTION: CPT

## 2022-05-20 RX ORDER — OMEPRAZOLE 10 MG/1
1 CAPSULE, DELAYED RELEASE ORAL
Qty: 0 | Refills: 0 | DISCHARGE
Start: 2022-05-20

## 2022-05-20 RX ORDER — FUROSEMIDE 40 MG
2 TABLET ORAL
Qty: 120 | Refills: 0
Start: 2022-05-20 | End: 2022-06-18

## 2022-05-20 RX ORDER — FUROSEMIDE 40 MG
1 TABLET ORAL
Qty: 0 | Refills: 0 | DISCHARGE

## 2022-05-20 RX ORDER — OMEPRAZOLE 10 MG/1
1 CAPSULE, DELAYED RELEASE ORAL
Qty: 0 | Refills: 0 | DISCHARGE

## 2022-05-20 RX ADMIN — Medication 175 MICROGRAM(S): at 05:11

## 2022-05-20 RX ADMIN — Medication 80 MILLIGRAM(S): at 05:11

## 2022-05-20 RX ADMIN — Medication 81 MILLIGRAM(S): at 12:40

## 2022-05-20 RX ADMIN — Medication 100 MILLIGRAM(S): at 05:11

## 2022-05-20 RX ADMIN — ISOSORBIDE MONONITRATE 30 MILLIGRAM(S): 60 TABLET, EXTENDED RELEASE ORAL at 12:40

## 2022-05-20 RX ADMIN — AMLODIPINE BESYLATE 5 MILLIGRAM(S): 2.5 TABLET ORAL at 05:11

## 2022-05-20 RX ADMIN — CARVEDILOL PHOSPHATE 12.5 MILLIGRAM(S): 80 CAPSULE, EXTENDED RELEASE ORAL at 05:11

## 2022-05-20 RX ADMIN — HEPARIN SODIUM 5000 UNIT(S): 5000 INJECTION INTRAVENOUS; SUBCUTANEOUS at 05:11

## 2022-05-20 RX ADMIN — HEPARIN SODIUM 5000 UNIT(S): 5000 INJECTION INTRAVENOUS; SUBCUTANEOUS at 12:39

## 2022-05-20 NOTE — PROGRESS NOTE ADULT - PROBLEM SELECTOR PLAN 3
Home meds: Coreg 12.5mg bid, Statin, Lasix 40mg bid, Lisinopril 10  HFpEF 20 % in 2020   Endorses SOB and LE swelling   Could be CHF exacerbation causing sx    - c/w Coreg  - c/w Statin  - Lasix 80 mg IV bid for diuresis   - hold Lisinopril   - s/p TTE

## 2022-05-20 NOTE — PROGRESS NOTE ADULT - ATTENDING COMMENTS
#ALDEN - Creatinine has gone up from 4.4-4.8mg/dL. Continue po furosemide     # Stage 4 CKD. Secondary to diabetic nephropathy. Baseline serum creatinine 3.9-4.4mg/dL.     # Volume overload - He is positive 430ml/hour on furosemide 40mg BID. We will increase furosemide 80mg PO BID.     # Medication toxicity monitoring: Since patient is on loop diuretic, we will continue to monitor for hypokalemia.     The rest of the recommendations as per fellow's note.    Kathryn Fernandes MD  Attending Nephrologist  [phone] .
#ALDEN - Creatinine has gone up from 4.4-4.8mg/dL. We can discontinue IV lasix now. Please switch to PO furosemide 40mg BID today.     # Stage 4 CKD. Secondary to diabetic nephropathy. Baseline serum creatinine 3.9-4.4mg/dL.     # Volume overload - agree with IV furosemide 40mg BID. Monitor I/O and weights daily.     # Medication toxicity monitoring: Since patient is on loop diuretic, we will continue to monitor for hypokalemia.     The rest of the recommendations as per fellow's note.    Kathryn Fernandes MD  Attending Nephrologist  [phone]
#ALDEN - Creatinine has gone up from stable at 4.8-4.9. Continue po furosemide     # Stage 4 CKD. Secondary to diabetic nephropathy. Baseline serum creatinine 3.9-4.4mg/dL.     # Volume overload - good urine response with po furosemide 80mg BID.   NO objection to discharge. Follow up in 1-2 weeks at nephrology clinic. ([phone])    # Medication toxicity monitoring: Since patient is on loop diuretic, we will continue to monitor for hypokalemia.     The rest of the recommendations as per fellow's note.    Kathryn Fernandes MD  Attending Nephrologist  [phone] .
Agree with above with the following additions/corrections:    81M PMH CAD (s/p MACI x 2 last in 2019), HFpEF (50% in 2020), DM2, HTN, HLD, ?CKD3 based on old labs, hypothyroidism presents with 2 weeks of chest heaviness, SOB, and fatigue concern for volume overload 2/2 to ALDEN on CKD vs. CHF exacerbation.    1. Stage 4 CKD - FENa 2.8 Renal recs appreciated. Lasix increased to 80 PO BID. HD for now on hold despite BUN/SCr elevation patient will f/u with Nephrology in 1 week to further discuss  2. CHF Exacerbation - TTE from 2020 EF of 50%. Has elevated proBNP to ~1440 (prior was ~1300) No JVD, but has b/l LE edema. On PO diuresis now . Strict I/Os. TTE shows some WMA, curbsided cardiology recommending outpatient follow up  3. LE Edema - VA duplex neg for DVT   4. Anemia - no LUIS on iron studies      home w/ home PT plan     d/c planning 32 minutes     Case d/w Dr. Hill and Dr. Ecniso .
Agree with above with the following additions/corrections:    81M PMH CAD (s/p MACI x 2 last in 2019), HFpEF (50% in 2020), DM2, HTN, HLD, ?CKD3 based on old labs, hypothyroidism presents with 2 weeks of chest heaviness, SOB, and fatigue concern for volume overload 2/2 to ALDEN on CKD vs. CHF exacerbation.    1. Stage 4 CKD - FENa 2.8 started on IV diuresis switched to PO 40 Lasix BID Strict I/Os. Renal following. Renal US neg for hydro. Will f/u with renal regarding HD as an outpatient   2. CHF Exacerbation - TTE from 2020 EF of 50%. Has elevated proBNP to ~1440 (prior was ~1300) No JVD, but has b/l LE edema. On PO diuresis now . Strict I/Os. TTE shows some WMA, curbsided cardiology recommending outpatient follow up  3. LE Edema - treating with diuresis as above R > L pending VA duplex to r/o DVT  4. Anemia - no LUIS on iron studies      home w/ home PT plan for d/c tomorrow     Case d/w Dr. Hill and Dr. Enciso .
Agree with above with the following additions/corrections:    81M PMH CAD (s/p MACI x 2 last in 2019), HFpEF (50% in 2020), DM2, HTN, HLD, ?CKD3 based on old labs, hypothyroidism presents with 2 weeks of chest heaviness, SOB, and fatigue concern for volume overload 2/2 to ALDEN on CKD vs. CHF exacerbation.    1. Stage 4 CKD - FENa 2.8 started on Lasix 40 IV BID. Strict I/Os. Renal following. Renal US pending. Unclear if patient has good insight into illness. HD has been discussed before but patient has refused. Avoid nephrotoxic agents   2. CHF Exacerbation - TTE from 2020 EF of 50%. Has elevated proBNP to ~1440 (prior was ~1300) No JVD, but has b/l LE edema. On IV diuresis with Lasix. Strict I/Os and daily weights TTE pending. Trending trops in the setting of mild CP with EKG showing no ischemic changes   3. LE Edema - treating with diuresis as above R > L pending VA duplex to r/o DVT  4. Anemia - checking iron studies      PT eval      Case d/w Dr. Hill and Dr. Enciso
Agree with above with the following additions/corrections:    81M PMH CAD (s/p MACI x 2 last in 2019), HFpEF (50% in 2020), DM2, HTN, HLD, ?CKD3 based on old labs, hypothyroidism presents with 2 weeks of chest heaviness, SOB, and fatigue concern for volume overload 2/2 to ALDEN on CKD vs. CHF exacerbation.    1. Stage 4 CKD - FENa 2.8 Renal recs appreciated. Lasix increased to 80 PO BID. HD for now on hold   2. CHF Exacerbation - TTE from 2020 EF of 50%. Has elevated proBNP to ~1440 (prior was ~1300) No JVD, but has b/l LE edema. On PO diuresis now . Strict I/Os. TTE shows some WMA, curbsided cardiology recommending outpatient follow up  3. LE Edema - VA duplex neg for DVT   4. Anemia - no LUIS on iron studies      home w/ home PT plan for d/c tomorrow     Case d/w Dr. Hill and Dr. Enciso .

## 2022-05-20 NOTE — PROGRESS NOTE ADULT - SUBJECTIVE AND OBJECTIVE BOX
PROGRESS NOTE:     CONTACT INFO:  Amadou Hill MD | PGY-1  Pager: [phone] [place], [id number] [facility]  Also on TEAMS  After 7pm page night float  On weekends after 1pm check resident assignment tab to see who is covering      Patient is a 81y old  Male who presents with a chief complaint of ALDEN on CKD and possible CHF exacerbation (19 May 2022 14:12)      SUBJECTIVE / OVERNIGHT EVENTS:    - No acute events overnight.   - No fevers/chills.  - Patient seen and evaluated at bedside.  - Denies SOB at rest, chest pain, palpitations, abdominal pain, nausea/vomiting    ADDITIONAL REVIEW OF SYSTEMS:    MEDICATIONS  (STANDING):  amLODIPine   Tablet 5 milliGRAM(s) Oral daily  aspirin enteric coated 81 milliGRAM(s) Oral daily  atorvastatin 10 milliGRAM(s) Oral at bedtime  carvedilol 12.5 milliGRAM(s) Oral every 12 hours  dextrose 5%. 1000 milliLiter(s) (100 mL/Hr) IV Continuous <Continuous>  dextrose 5%. 1000 milliLiter(s) (50 mL/Hr) IV Continuous <Continuous>  dextrose 50% Injectable 25 Gram(s) IV Push once  dextrose 50% Injectable 12.5 Gram(s) IV Push once  dextrose 50% Injectable 25 Gram(s) IV Push once  furosemide    Tablet 80 milliGRAM(s) Oral every 12 hours  glucagon  Injectable 1 milliGRAM(s) IntraMuscular once  heparin   Injectable 5000 Unit(s) SubCutaneous every 8 hours  hydrALAZINE 100 milliGRAM(s) Oral every 12 hours  insulin lispro (ADMELOG) corrective regimen sliding scale   SubCutaneous three times a day before meals  insulin lispro (ADMELOG) corrective regimen sliding scale   SubCutaneous at bedtime  isosorbide   mononitrate ER Tablet (IMDUR) 30 milliGRAM(s) Oral daily  levothyroxine 175 MICROGram(s) Oral daily  tamsulosin 0.4 milliGRAM(s) Oral at bedtime    MEDICATIONS  (PRN):  dextrose Oral Gel 15 Gram(s) Oral once PRN Blood Glucose LESS THAN 70 milliGRAM(s)/deciliter      CAPILLARY BLOOD GLUCOSE      POCT Blood Glucose.: 121 mg/dL (19 May 2022 17:03)  POCT Blood Glucose.: 115 mg/dL (19 May 2022 12:11)  POCT Blood Glucose.: 110 mg/dL (19 May 2022 08:40)    I&O's Summary    18 May 2022 07:01  -  19 May 2022 07:00  --------------------------------------------------------  IN: 1380 mL / OUT: 950 mL / NET: 430 mL    19 May 2022 07:01  -  20 May 2022 06:57  --------------------------------------------------------  IN: 970 mL / OUT: 3400 mL / NET: -2430 mL        PHYSICAL EXAM:  Vital Signs Last 24 Hrs  T(C): 36.4 (20 May 2022 04:02), Max: 36.8 (19 May 2022 20:55)  T(F): 97.6 (20 May 2022 04:02), Max: 98.2 (19 May 2022 20:55)  HR: 66 (20 May 2022 04:02) (66 - 69)  BP: 104/64 (20 May 2022 04:02) (104/64 - 145/78)  BP(mean): --  RR: 18 (20 May 2022 04:02) (18 - 18)  SpO2: 97% (20 May 2022 04:02) (96% - 97%)    CONSTITUTIONAL: NAD, well-developed  RESPIRATORY: Normal respiratory effort; lungs are clear to auscultation bilaterally  CARDIOVASCULAR: Regular rate and rhythm, normal S1 and S2, no murmur/rub/gallop; No lower extremity edema; Peripheral pulses are 2+ bilaterally  ABDOMEN: Nontender to palpation, normoactive bowel sounds, no rebound/guarding; No hepatosplenomegaly  MUSCULOSKELETAL: no clubbing or cyanosis of digits; no joint swelling or tenderness to palpation  PSYCH: A+O to person, place, and time; affect appropriate    LABS:                        9.9    6.81  )-----------( 152      ( 19 May 2022 07:10 )             30.6     05-19    138  |  103  |  97<H>  ----------------------------<  105<H>  4.2   |  18<L>  |  4.95<H>    Ca    8.9      19 May 2022 07:17  Phos  5.0     05-19  Mg     2.3     05-19    TPro  6.4  /  Alb  3.3  /  TBili  0.2  /  DBili  x   /  AST  18  /  ALT  10  /  AlkPhos  104  05-19                RADIOLOGY & ADDITIONAL TESTS:  Results Reviewed:   Imaging Personally Reviewed:  Electrocardiogram Personally Reviewed:    COORDINATION OF CARE:  Care Discussed with Consultants/Other Providers [Y/N]: Y  Prior or Outpatient Records Reviewed [Y/N]: Y   PROGRESS NOTE:     CONTACT INFO:  Amadou Hill MD | PGY-1  Pager: [phone] [place], [id number] [facility]  Also on TEAMS  After 7pm page night float  On weekends after 1pm check resident assignment tab to see who is covering      Patient is a 81y old  Male who presents with a chief complaint of ALDEN on CKD and possible CHF exacerbation (19 May 2022 14:12)      SUBJECTIVE / OVERNIGHT EVENTS:    - No acute events overnight.   - No fevers/chills.  - Patient seen and evaluated at chair   - Denies SOB at rest, chest pain, palpitations, abdominal pain, nausea/vomiting    ADDITIONAL REVIEW OF SYSTEMS:    MEDICATIONS  (STANDING):  amLODIPine   Tablet 5 milliGRAM(s) Oral daily  aspirin enteric coated 81 milliGRAM(s) Oral daily  atorvastatin 10 milliGRAM(s) Oral at bedtime  carvedilol 12.5 milliGRAM(s) Oral every 12 hours  dextrose 5%. 1000 milliLiter(s) (100 mL/Hr) IV Continuous <Continuous>  dextrose 5%. 1000 milliLiter(s) (50 mL/Hr) IV Continuous <Continuous>  dextrose 50% Injectable 25 Gram(s) IV Push once  dextrose 50% Injectable 12.5 Gram(s) IV Push once  dextrose 50% Injectable 25 Gram(s) IV Push once  furosemide    Tablet 80 milliGRAM(s) Oral every 12 hours  glucagon  Injectable 1 milliGRAM(s) IntraMuscular once  heparin   Injectable 5000 Unit(s) SubCutaneous every 8 hours  hydrALAZINE 100 milliGRAM(s) Oral every 12 hours  insulin lispro (ADMELOG) corrective regimen sliding scale   SubCutaneous three times a day before meals  insulin lispro (ADMELOG) corrective regimen sliding scale   SubCutaneous at bedtime  isosorbide   mononitrate ER Tablet (IMDUR) 30 milliGRAM(s) Oral daily  levothyroxine 175 MICROGram(s) Oral daily  tamsulosin 0.4 milliGRAM(s) Oral at bedtime    MEDICATIONS  (PRN):  dextrose Oral Gel 15 Gram(s) Oral once PRN Blood Glucose LESS THAN 70 milliGRAM(s)/deciliter      CAPILLARY BLOOD GLUCOSE      POCT Blood Glucose.: 121 mg/dL (19 May 2022 17:03)  POCT Blood Glucose.: 115 mg/dL (19 May 2022 12:11)  POCT Blood Glucose.: 110 mg/dL (19 May 2022 08:40)    I&O's Summary    18 May 2022 07:01  -  19 May 2022 07:00  --------------------------------------------------------  IN: 1380 mL / OUT: 950 mL / NET: 430 mL    19 May 2022 07:01  -  20 May 2022 06:57  --------------------------------------------------------  IN: 970 mL / OUT: 3400 mL / NET: -2430 mL        PHYSICAL EXAM:  Vital Signs Last 24 Hrs  T(C): 36.4 (20 May 2022 04:02), Max: 36.8 (19 May 2022 20:55)  T(F): 97.6 (20 May 2022 04:02), Max: 98.2 (19 May 2022 20:55)  HR: 66 (20 May 2022 04:02) (66 - 69)  BP: 104/64 (20 May 2022 04:02) (104/64 - 145/78)  BP(mean): --  RR: 18 (20 May 2022 04:02) (18 - 18)  SpO2: 97% (20 May 2022 04:02) (96% - 97%)      CONSTITUTIONAL: well-developed, no weight loss  RESPIRATORY: Normal respiratory effort; lungs are clear to auscultation bilaterally  CARDIOVASCULAR: Regular rate and rhythm, normal S1 and S2, no murmur/rub/gallop; lower extremity edema improving; Peripheral pulses are 2+ bilaterally  ABDOMEN: Nontender to palpation, normoactive bowel sounds, no rebound/guarding; No hepatosplenomegaly  MUSCULOSKELETAL: no clubbing or cyanosis of digits; no joint swelling or tenderness to palpation, back pain, LE edema, R >L   PSYCH: A+O to person, place, and time; affect appropriate  SKIN: venous stasis dermatitis bilaterally      LABS:                        9.9    6.81  )-----------( 152      ( 19 May 2022 07:10 )             30.6     05-19    138  |  103  |  97<H>  ----------------------------<  105<H>  4.2   |  18<L>  |  4.95<H>    Ca    8.9      19 May 2022 07:17  Phos  5.0     05-19  Mg     2.3     05-19    TPro  6.4  /  Alb  3.3  /  TBili  0.2  /  DBili  x   /  AST  18  /  ALT  10  /  AlkPhos  104  05-19                RADIOLOGY & ADDITIONAL TESTS:  Results Reviewed:   Imaging Personally Reviewed:  Electrocardiogram Personally Reviewed:    COORDINATION OF CARE:  Care Discussed with Consultants/Other Providers [Y/N]: Y  Prior or Outpatient Records Reviewed [Y/N]: Y

## 2022-05-20 NOTE — PROGRESS NOTE ADULT - SUBJECTIVE AND OBJECTIVE BOX
[facility] DIVISION OF KIDNEY DISEASES AND HYPERTENSION -- FOLLOW UP NOTE  --------------------------------------------------------------------------------  HPI: 81 year old Male with significant PMH CKD stage 4, CAD (s/p MACI x 2 last in 2019), HFpEF (50% in 2020), DM2, HTN, HLD, and hypothyroidism presents to [facility] on 5/16 with chest heaviness, SOB, and fatigue and worsening LE swelling x 2 weeks.     Kidney History : Pt. is CKD stage 4 likely 2/2 DM, serological work up done outpatient was negative. Follows with Dr. Natalia Sharma. Last seen in November 2021. Pt. has been told that he will be needing hemodialysis for worsening kidney function and he refused in the past (as per outpatient chart). On review of [facility], it was noted that Scr was 4.44 on 11/5/21. Scr remained elevated at 3.97 on 2/18/22. Scr was elevated at 4.38 on admission. UA was WNL with spot urine TP/CR elevated to 1.3 on admission.     24 hour event:  Pt. seen and examined  this AM, denies any acute complaints. No acute events overnight. Scr. stable at 4.9. Pt. jason any shortness of breath or worsening of lower extremity edema.     PAST HISTORY  --------------------------------------------------------------------------------  No significant changes to PMH, PSH, FHx, SHx, unless otherwise noted    ALLERGIES & MEDICATIONS  --------------------------------------------------------------------------------  Allergies    No Known Drug Allergies  shellfish (Vomiting)    Intolerances    Standing Inpatient Medications  amLODIPine   Tablet 5 milliGRAM(s) Oral daily  aspirin enteric coated 81 milliGRAM(s) Oral daily  atorvastatin 10 milliGRAM(s) Oral at bedtime  carvedilol 12.5 milliGRAM(s) Oral every 12 hours  dextrose 5%. 1000 milliLiter(s) IV Continuous <Continuous>  dextrose 5%. 1000 milliLiter(s) IV Continuous <Continuous>  dextrose 50% Injectable 25 Gram(s) IV Push once  dextrose 50% Injectable 12.5 Gram(s) IV Push once  dextrose 50% Injectable 25 Gram(s) IV Push once  furosemide    Tablet 80 milliGRAM(s) Oral every 12 hours  glucagon  Injectable 1 milliGRAM(s) IntraMuscular once  heparin   Injectable 5000 Unit(s) SubCutaneous every 8 hours  hydrALAZINE 100 milliGRAM(s) Oral every 12 hours  insulin lispro (ADMELOG) corrective regimen sliding scale   SubCutaneous three times a day before meals  insulin lispro (ADMELOG) corrective regimen sliding scale   SubCutaneous at bedtime  isosorbide   mononitrate ER Tablet (IMDUR) 30 milliGRAM(s) Oral daily  levothyroxine 175 MICROGram(s) Oral daily  tamsulosin 0.4 milliGRAM(s) Oral at bedtime    PRN Inpatient Medications  dextrose Oral Gel 15 Gram(s) Oral once PRN    REVIEW OF SYSTEMS  --------------------------------------------------------------------------------  Gen: No fevers   Respiratory: No dyspnea  CV: No chest pain  GI: No abdominal pain  : No dysuria  MSK: No  edema  Neuro: no dizziness     VITALS/PHYSICAL EXAM  --------------------------------------------------------------------------------  T(C): 36.4 (05-20-22 @ 04:02), Max: 36.8 (05-19-22 @ 20:55)  HR: 66 (05-20-22 @ 04:02) (66 - 69)  BP: 104/64 (05-20-22 @ 04:02) (104/64 - 145/78)  RR: 18 (05-20-22 @ 04:02) (18 - 18)  SpO2: 97% (05-20-22 @ 04:02) (96% - 97%)  Wt(kg): --    05-19-22 @ 07:01  -  05-20-22 @ 07:00  --------------------------------------------------------  IN: 970 mL / OUT: 3400 mL / NET: -2430 mL    05-20-22 @ 07:01  -  05-20-22 @ 10:54  --------------------------------------------------------  IN: 420 mL / OUT: 650 mL / NET: -230 mL    Physical Exam:  	Gen: NAD on RA    	HEENT: MMM  	Pulm: CTA B/L,   	CV: S1S2  	Abd: Soft, +BS   	Ext: B/L LE edema +, No asterixis   	Neuro: Awake  	Skin: Warm and dry    LABS/STUDIES  --------------------------------------------------------------------------------              10.0   7.55  >-----------<  143      [05-20-22 @ 07:13]              31.7     139  |  105  |  103  ----------------------------<  99      [05-20-22 @ 07:13]  4.2   |  16  |  4.94        Ca     8.9     [05-20-22 @ 07:13]      Mg     2.2     [05-20-22 @ 07:13]      Phos  4.7     [05-20-22 @ 07:13]    TPro  6.5  /  Alb  3.6  /  TBili  0.2  /  DBili  x   /  AST  26  /  ALT  16  /  AlkPhos  107  [05-20-22 @ 07:13]    Creatinine Trend:  SCr 4.94 [05-20 @ 07:13]  SCr 4.95 [05-19 @ 07:17]  SCr 4.84 [05-18 @ 07:18]  SCr 4.27 [05-17 @ 06:37]  SCr 4.38 [05-16 @ 19:19]    Urinalysis - [05-16-22 @ 23:39]      Color Light Yellow / Appearance Clear / SG 1.012 / pH 5.5      Gluc Negative / Ketone Negative  / Bili Negative / Urobili Negative       Blood Negative / Protein 100 / Leuk Est Negative / Nitrite Negative      RBC 0 / WBC 0 / Hyaline 0 / Gran  / Sq Epi  / Non Sq Epi 0 / Bacteria Negative    Urine Creatinine 69      [05-16-22 @ 23:39]  Urine Protein 92      [05-16-22 @ 23:39]  Urine Sodium 61      [05-16-22 @ 23:39]  Urine Urea Nitrogen 467      [05-16-22 @ 23:39]  Urine Potassium 38      [05-16-22 @ 23:39]  Urine Chloride 50      [05-16-22 @ 23:39]  Urine Osmolality 367      [05-16-22 @ 23:39]    Iron 55, TIBC 220, %sat 25      [05-18-22 @ 07:18]  Ferritin 151      [05-18-22 @ 07:26]  HbA1c 5.7      [02-12-20 @ 08:41]  TSH 0.16      [05-17-22 @ 06:37]

## 2022-05-20 NOTE — PROGRESS NOTE ADULT - PROBLEM SELECTOR PLAN 1
EKG NSR no ST elevations or TWI   trops 62-->65  Could be stable angina     - TTE with preserved LVEF   - trops peaked   - Can get nuclear stress test if TTE abnormal either during this hospitalization or outpatient

## 2022-05-20 NOTE — DISCHARGE NOTE NURSING/CASE MANAGEMENT/SOCIAL WORK - PATIENT PORTAL LINK FT
You can access the FollowMyHealth Patient Portal offered by [facility] by registering at the following website: [email or web address]. By joining [employer]’s FollowMyHealth portal, you will also be able to view your health information using other applications (apps) compatible with our system.

## 2022-05-20 NOTE — PROGRESS NOTE ADULT - PROBLEM SELECTOR PLAN 3
Pt. with metabolic acidosis in the setting of CKD. Start sodium bicarb 650 mg po BID. Monitor Serum CO2 daily.     If patient gets discharged today, he will need to follow up with  within 1-2 weeks.    If you have any questions, please feel free to contact me  Luis Alfredo Maguire  Nephrology Fellow  [phone]/ [employer] Teams(Preferred)  (After 5pm or on weekends please page the on-call fellow).

## 2022-05-20 NOTE — PROGRESS NOTE ADULT - PROBLEM SELECTOR PROBLEM 1
Chest pain
Stage 4 chronic kidney disease
Chest pain

## 2022-05-20 NOTE — PROGRESS NOTE ADULT - ASSESSMENT
Pt. with CKD stage 4 admitted with chest heaviness  
81M PMH CAD (s/p MACI x 2 last in 2019), HFpEF (50% in 2020), DM2, HTN, HLD, ?CKD3 based on old labs, hypothyroidism presents with 2 weeks of chest heaviness, SOB, and fatigue likely 2/2 worsening CKD/ALDEN vs CHF exacerbation 

## 2022-05-20 NOTE — PROGRESS NOTE ADULT - PROVIDER SPECIALTY LIST ADULT
Nephrology
Nephrology
Internal Medicine
Nephrology
Internal Medicine

## 2022-05-20 NOTE — PROGRESS NOTE ADULT - PROBLEM SELECTOR PLAN 4
Likely in setting of HF and CKD  - improving   - LE dopplers: No evidence of deep venous thrombosis in either lower extremity.  - diuresis as above

## 2022-05-20 NOTE — PROGRESS NOTE ADULT - PROBLEM SELECTOR PLAN 2
SCr 4.38 (baseline ~4)  Follows with  outpatient   CKD stage 4 likely 2/2 diabetic nephropathy   On 40 PO lasix bid at home   FeNa 2.8  UA pretty bland with urine protein/Cr 1.3  CXR clear     - Nephrology consulted, appreciate recs   - Fe urea 37.1 suggesting intrinsic renal disease  - Renal U/S negative for hydronephrosis   - Strict I/O   - iron studies wnl   - increase Lasix 80mg IV bid   - avoid nephrotoxic agents SCr 4.38 (baseline ~4)  Follows with  outpatient   CKD stage 4 likely 2/2 diabetic nephropathy   On 40 PO lasix bid at home   FeNa 2.8  UA pretty bland with urine protein/Cr 1.3  CXR clear     - Nephrology consulted, appreciate recs   - Fe urea 37.1 suggesting intrinsic renal disease  - Renal U/S negative for hydronephrosis   - Strict I/O   - iron studies wnl   - c/w Lasix 80mg IV bid   - avoid nephrotoxic agents

## 2022-05-20 NOTE — PROGRESS NOTE ADULT - PROBLEM SELECTOR PLAN 1
Pt. with CKD stage 4 in the setting of likely DM. On review of [facility], it was noted that Scr was 4.44 on 11/5/21. Scr remained elevated at 3.97 on 2/18/22. Scr was elevated at 4.38 on admission. Scr stable at 4.9 today. UA was WNL with spot urine TP/CR elevated to 1.3 on admission. Pt. has been told that he will be needing hemodialysis for worsening kidney function and he refused in the past (as per outpatient chart). Received Lasix 40 mg IV once in ER.     Pt. clinically improved. Continue Lasix 80 mg BID. Pt. has history of BPH, will need frequent bladder scan to rule retention. Monitor labs and urine output. Avoid any potential nephrotoxins. Dose medications as per eGFR.

## 2022-05-20 NOTE — PROGRESS NOTE ADULT - REASON FOR ADMISSION
ALDEN on CKD and possible CHF exacerbation
ADLEN on CKD and possible CHF exacerbation

## 2022-05-20 NOTE — PROGRESS NOTE ADULT - PROBLEM SELECTOR PLAN 5
On hydral 100 bid, Lisinopril 10, Coreg 12.5mg bid, amlodipine 5, Lasix 40mg bid, and Isosorbide mononitrate 30mg daily     - Continue Coreg, amlodipine, Isosorbide mononitrate 30mg daily   - Lasix 80mg PO bid

## 2022-05-20 NOTE — PROGRESS NOTE ADULT - PROBLEM/PLAN-9
[facility]  Discharge Summary - Medicine & Pediatrics       Date of Admission:  2021  Date of Discharge:  2021  Discharging Provider: Dr. Mckinney  Discharge Service: General Pediatrics    Discharge Diagnoses   Meningitis  Diarrhea  Rash, likely viral exanthem    Follow-ups Needed After Discharge   Follow-up Appointments     Adult [facility] Follow-up and recommended labs and tests       - Repeat labs CBC with Diff and CMP with PICC dressing change     Follow up with primary care provider, [facility],   within 5 days for hospital follow-up.    Appointments on [place] and/or [facility] (with [facility] or [facility]   provider or service). Call [phone] if you haven't heard regarding   these appointments within 7 days of discharge.         Discharge Disposition   Discharged to home  Condition at discharge: Stable      Hospital Course   Laura Ho was admitted on 2021 with fevers, diarrhea, and concern for meningitis The following problems were addressed during her hospitalization:    Meningitis, bacterial vs. viral  Rash, likely viral exanthem  Presented from [facility] after a lumbar puncture showed 173 WBC, 38 neutrophils, 13 lymphocytes, 49 monocytes. She had been on prophylactic keflex prior to admission. She was started initially on acyclovir, ceftriaxone, and vancomycin for empiric coverage with the additional concern of having possibly been partially treated with her prophylactic keflex for her urinary tract abnormalities. Infectious Disease was consulted. Vancomycin was discontinued given low suspicion for highly resistant strep species or other organisms not covered by ceftriaxone.     HSV PCR from blood and CSF returned negative, and acyclovir was discontinued. A complete meningitis/encephalitis panel was negative including enterovirus. She continued to be afebrile and well-appearing on ceftriaxone 50 mg/kg 
every 12 hours for several days as CSF cultures were monitored, with no growth after 96 hours.     In discussion with ID, given no organism identification and possibility of partial treatment of a bacterial infection with previous cephalexin use, we felt it necessary to treat with a 2 week course of IV ceftriaxone to complete a course for possible bacterial meningitis. A PICC line was placed by interventional radiology on 10/12 for a total 14 day course of antibiotics through 10/20, and she was discharged home on 10/13 to complete ceftriaxone.     WBC at the time of discharge was 6.8; plan to repeat labs in one week with PICC dressing change.     She does not need ID follow up unless new issues or questions arrive. Otherwise follow up with primary care next week prior to discontinuation of antibiotics and again after antibiotics completed (this should line up with 2 month WCC.     Diarrhea  Presented with one day of blood and mucus in stool in the setting of one week of diarrhea. Bacterial enteritis and milk protein intolerance were considered. C. diff and enteric pathogen panel were negative.  Given her CSF results, enterovirus or E. coli causing meningitis with GI involvement were suspected. Her stools normalized without further bleeding.     Consultations This Hospital Stay   PEDS INFECTIOUS DISEASES IP CONSULT  PATIENT LEARNING CENTER IP CONSULT  VASCULAR ACCESS PEDS IP CONSULT  INTERVENTIONAL RADIOLOGY ADULT/PEDS IP CONSULT    Code Status   Full Code     Mao Mckinney MD   of Medicine  Med-Peds Hospitalist  Pager [phone]    ______________________________________________________________________    Physical Exam   Vital Signs: Temp: 97  F (36.1  C) Temp src: Axillary BP: (!) 89/46 Pulse: 122   Resp: (!) 36 SpO2: 100 % O2 Device: None (Room air)    Weight: 11 lbs 4 oz  Gen: Awake, alert, lying in dad's lap, NAD  Head: NCAT, AF soft and flat  ENT: MMM  CV: RRR, no MRG, no LE edema  Resp: CTAB, 
DISPLAY PLAN FREE TEXT
non-labored  GI: Soft, NT, ND, NABS        Primary Care Physician   [facility]    Discharge Orders      Home infusion referral      Reason for your hospital stay    Laura was admitted for a fever. She was found to have meningitis. We were unable to determine if this was     Activity    Your activity upon discharge: activity as tolerated     Adult [facility] Follow-up and recommended labs and tests    - Repeat labs CBC with Diff and CMP with PICC dressing change     Follow up with primary care provider, [facility], within 3-5 days for hospital follow- up.  No follow up labs or test are needed.        Appointments on [place] and/or [facility] (with [facility] or [facility] provider or service). Call [phone] if you haven't heard regarding these appointments within 7 days of discharge.     Diet    Follow this diet upon discharge: Orders Placed This Encounter      Infant Formula Feeding on Demand: Daily Other - Specify; Similac Advance; Oral; On Demand      NPO per Anesthesia Guidelines for Procedure/Surgery Except for: Meds       Significant Results and Procedures   Most Recent 3 CBC's:Recent Labs   Lab Test 10/12/21  1504 10/09/21  0647   WBC 6.8 7.7   HGB 10.2* 10.9   MCV 95 96   * 425     Most Recent 3 BMP's:Recent Labs   Lab Test 10/12/21  1504 10/08/21  0933 10/07/21  0030    143 139   POTASSIUM 4.2 5.0 4.4   CHLORIDE 108 114* 110   CO2 28 19 25   BUN 9 5 9   CR 0.26 0.34 0.33   ANIONGAP 3 10 4   TORSTEN 9.4 9.8 9.4   GLC 95 88 81       Results for orders placed or performed during the hospital encounter of 10/06/21   IR PICC Placement < 5 Yrs of Age    Narrative    Date: 2021.    History: Patient with a history of suspected meningitis, requiring  long term vascular access, here for placement of single lumen,  peripherally inserted central venous catheter (PICC). Patient's team  asked for largest diameter single lumen PICC possible, though states  he would 
prefer an upper extremity 1.9 Scottish catheter as opposed to a  femoral vein 3 Scottish single lumen catheter. Ultimately, team states  that they defer to IR for appropriate size catheter placement.    Procedure: Image guided placement of right basilic vein, 1.9 Fr., 15  cm, single lumen  PICC.    Preop diagnosis: Concern for meningitis.    Postop diagnosis: Same.    : Harshil Motta PA-C.    Assist: Kareem Patiño, RTR.    Medications: 1% lidocaine 1 ml local anesthesia, heparin 1.0 ml 10  units/ml.    Nursing: The patient's vital signs and oxygen saturation were  continuously monitored by [facility] anesthesia staff under the  supervision of the attending physician, and remained stable throughout  the procedure.    Fluoroscopy time: 0.4 minutes.    IV contrast: None.    Complications: None.    Consent: The procedure, as well as risks and benefits was described in  detail with the reasons guardian (mother). Informed written and verbal  consent were obtained prior to the procedure.    Procedure/Findings: Patient was placed in the supine position, and the  right upper extremity was abducted and externally rotated. The medial  aspect of the right upper extremity was prepped draped in the usual  sterile fashion. Ultrasound revealed a patent right basilic vein.  Sonographic evaluation of vein diameter supports 1.9 Scottish PICC  rather than 3 Scottish PICC, as the latter would likely increase the  risk of catheter associated thrombus. Under ultrasound guidance, a  22-gauge Jelco needle/catheter was advanced into the right basilic  vein. Under fluoroscopic guidance, a short 0.010 nitinol wire was  advanced into the right atrium. The skin and overlying tissues were  anesthetized with 1% lidocaine, and a 2 mm skin incision was made with  a # 11 blade.  The Jelco catheter was exchanged over wire for a 3  Scottish dilator. Under fluoroscopic guidance, the wire was used to  measure from the right atrium to the skin. The wire was 
removed and  the dilator was flushed with saline and capped. A 1.9 Malawian single  lumen PICC was selected and prepared. The measurement was used to cut  the PICC to a length of 15 cm. The dilator was uncapped and under  fluoroscopic guidance A.010 wire was advanced through the right atrium  into the inferior vena cava. Under fluoroscopic guidance the 3 Malawian  dilator was exchanged over wire for the 1.9 Malawian 15 cm single lumen  PICC, which was advanced centrally. The wire was removed and repeat  fluoroscopy revealed the catheter tip to be appropriately positioned  at the mid right atrium. The catheter was secured with 2, 3-0 Ethilon  sutures. The catheter lumen aspirated and flushed freely and was  locked with 1.0 ml heparin, 10 units per ml. A chlorhexidine Biopatch  was applied to the catheter exit site. The site was cleansed and  dressed. Images were saved throughout the procedure. The procedure was  well tolerated, with no immediate complications.    Estimate blood loss: 1 cc.    Specimens: None.       Impression    Impression: Image guided placement of right basilic vein, 1.9 Malawian,  15 cm, single lumen  PICC.     Plan: Patient transported to patient care unit in stable condition.   Wound care and dressing changes per routine. PICC is ready for use as  indicated for inpatient/outpatient IV antibiotics. This was  communicated to the team, who confirms that patient may be discharged  with 1.9 Malawian single lumen PICC, which is compatible with antibiotic  regiment.    Attestation: The physician assistant (PA) who performed this procedure  and signed the above report is licensed to practice in the [facility] pursuant to [place] Statute 147A.09.  This includes meeting the  Statute and [place] Board of Medical [employer] requirement of an  active Delegation Agreement, which documents delegation of services by  primary and alternate supervising physicians. All services rendered  are performed under a 
collaborative agreement with Dr. Amanda Mahan, Director of Interventional Radiology, [facility].    ÁNGEL PRAKASH PA-C         SYSTEM ID:  [id number]       Discharge Medications   Current Discharge Medication List      START taking these medications    Details   cefTRIAXone (ROCEPHIN) 1 GM vial Inject 0.24 g (240 mg) into the vein every 12 hours for 9 days  Qty: 43.2 mL, Refills: 0    Associated Diagnoses: Fever in patient 29 days to 3 months old           Allergies   Allergies   Allergen Reactions     Vancomycin Rash     Infusion flushing reaction     
DISPLAY PLAN FREE TEXT

## 2022-05-23 ENCOUNTER — NON-APPOINTMENT (OUTPATIENT)
Age: 82
End: 2022-05-23

## 2022-05-25 RX ORDER — SODIUM BICARBONATE 650 MG/1
650 TABLET ORAL TWICE DAILY
Qty: 60 | Refills: 6 | Status: ACTIVE | COMMUNITY
Start: 2021-09-01 | End: 1900-01-01

## 2022-06-15 ENCOUNTER — APPOINTMENT (OUTPATIENT)
Dept: NEPHROLOGY | Facility: CLINIC | Age: 82
End: 2022-06-15
Payer: MEDICARE

## 2022-06-15 DIAGNOSIS — E87.2 ACIDOSIS: ICD-10-CM

## 2022-06-15 PROCEDURE — 99215 OFFICE O/P EST HI 40 MIN: CPT | Mod: 95

## 2022-06-16 PROBLEM — E87.2 METABOLIC ACIDOSIS: Status: ACTIVE | Noted: 2021-09-01

## 2022-06-16 NOTE — REASON FOR VISIT
[Home] : at home, [unfilled] , at the time of the visit. [Other Location: e.g. Home (Enter Location, City,State)___] : at [unfilled] [Other:____] : [unfilled] [Patient] : the patient [Follow-Up] : a follow-up visit

## 2022-06-16 NOTE — HISTORY OF PRESENT ILLNESS
[FreeTextEntry1] : follow up ckd 4/5 \par \par 80 yo Cuban speaking male with PMH HTN, DMT2, CAD with stents x2 (2000), hypothyroid, depression, transferred from [facility] for NSTEMI and hypertensive urgency s/p CCU stay requiring on nitro gtt s/p PCI with MACI x 2 to [facility] 4/1. Baseline creat in sep 2018 was 2.0. admitted with a creat of 3.0 likely in the setting of malignant hypertension which got worse due to RAMIRO. Discharged with a creat of 3.4 on 4/8/2019 and has beeni in the 3s range. [facility] 5/2019\par \par baseline creat ~4.0 mg/dl \par \par had a recent hospitalization at [facility] with volume overload due to both renal failure and heart failure- he was diuresed and felt much better. currently on lasix 40 mg bid and feels much better. last creat was in may 2022 was 4.9 mg/dl \par \par per daughter, no nausea/vomiting, appetite remains good \par no shortness of breath \par \par they are moving to [place] next month

## 2022-06-16 NOTE — ASSESSMENT
[FreeTextEntry1] : 80 yo male with ckd 5 likely sec to diabetic nephropathy, HTN, CAD with stents x2 (2000), hypothyroid, depression, transferred from [facility] for NSTEMI and hypertensive urgency s/p CCU stay requiring on nitro gtt s/p PCI with MACI x 2 to pLAD 4/1 with ALDEN, now progressive CKD \par \par CKD 5- Likely sec to diabetic nephropathy. serological work up negative. ALDEN likely sec to malignant hypertension and worsened by RAMIRO during cardiac cath. his creatinine has been in the 4s with the last eGFR of 12 ml/min. he has no uremic s/s. I have discussed dialysis with him and his daughter in the past and they have opted against access planning, which is reasonable considering his age and co-morbidities.  \par \par he needs repeat labs that I will try to coordinate with cardiology considering he is seeing them soon. \par \par he is moving to [place] next month so will not be following up with me \par \par \par

## 2022-06-22 ENCOUNTER — OUTPATIENT (OUTPATIENT)
Dept: OUTPATIENT SERVICES | Facility: HOSPITAL | Age: 82
LOS: 1 days | End: 2022-06-22
Payer: MEDICARE

## 2022-06-22 ENCOUNTER — APPOINTMENT (OUTPATIENT)
Dept: CARDIOLOGY | Facility: HOSPITAL | Age: 82
End: 2022-06-22

## 2022-06-22 VITALS
WEIGHT: 240 LBS | BODY MASS INDEX: 36.37 KG/M2 | HEIGHT: 68 IN | HEART RATE: 69 BPM | DIASTOLIC BLOOD PRESSURE: 74 MMHG | SYSTOLIC BLOOD PRESSURE: 143 MMHG | OXYGEN SATURATION: 97 %

## 2022-06-22 DIAGNOSIS — I25.10 ATHEROSCLEROTIC HEART DISEASE OF NATIVE CORONARY ARTERY WITHOUT ANGINA PECTORIS: ICD-10-CM

## 2022-06-22 DIAGNOSIS — E11.22 TYPE 2 DIABETES MELLITUS WITH DIABETIC CHRONIC KIDNEY DISEASE: ICD-10-CM

## 2022-06-22 DIAGNOSIS — N18.4 TYPE 2 DIABETES MELLITUS WITH DIABETIC CHRONIC KIDNEY DISEASE: ICD-10-CM

## 2022-06-22 DIAGNOSIS — N18.5 TYPE 2 DIABETES MELLITUS WITH DIABETIC CHRONIC KIDNEY DISEASE: ICD-10-CM

## 2022-06-22 DIAGNOSIS — Z98.890 OTHER SPECIFIED POSTPROCEDURAL STATES: Chronic | ICD-10-CM

## 2022-06-22 DIAGNOSIS — I10 ESSENTIAL (PRIMARY) HYPERTENSION: ICD-10-CM

## 2022-06-22 DIAGNOSIS — I25.10 ATHEROSCLEROTIC HEART DISEASE OF NATIVE CORONARY ARTERY W/OUT ANGINA PECTORIS: ICD-10-CM

## 2022-06-22 PROCEDURE — 93005 ELECTROCARDIOGRAM TRACING: CPT

## 2022-06-22 PROCEDURE — G0463: CPT

## 2022-06-23 ENCOUNTER — NON-APPOINTMENT (OUTPATIENT)
Age: 82
End: 2022-06-23

## 2022-06-23 DIAGNOSIS — I10 ESSENTIAL (PRIMARY) HYPERTENSION: ICD-10-CM

## 2022-06-23 DIAGNOSIS — E11.22 TYPE 2 DIABETES MELLITUS WITH DIABETIC CHRONIC KIDNEY DISEASE: ICD-10-CM

## 2022-06-23 LAB
ANION GAP SERPL CALC-SCNC: 16 MMOL/L
BASOPHILS # BLD AUTO: 0.06 K/UL
BASOPHILS NFR BLD AUTO: 0.7 %
BUN SERPL-MCNC: 85 MG/DL
CALCIUM SERPL-MCNC: 9 MG/DL
CHLORIDE SERPL-SCNC: 104 MMOL/L
CHOLEST SERPL-MCNC: 137 MG/DL
CO2 SERPL-SCNC: 21 MMOL/L
CREAT SERPL-MCNC: 4.71 MG/DL
EGFR: 12 ML/MIN/1.73M2
EOSINOPHIL # BLD AUTO: 0.15 K/UL
EOSINOPHIL NFR BLD AUTO: 1.8 %
ESTIMATED AVERAGE GLUCOSE: 120 MG/DL
GLUCOSE SERPL-MCNC: 115 MG/DL
HBA1C MFR BLD HPLC: 5.8 %
HCT VFR BLD CALC: 32.9 %
HDLC SERPL-MCNC: 35 MG/DL
HGB BLD-MCNC: 10.6 G/DL
IMM GRANULOCYTES NFR BLD AUTO: 0.2 %
LDLC SERPL CALC-MCNC: 72 MG/DL
LYMPHOCYTES # BLD AUTO: 2.13 K/UL
LYMPHOCYTES NFR BLD AUTO: 25.8 %
MAN DIFF?: NORMAL
MCHC RBC-ENTMCNC: 31.8 PG
MCHC RBC-ENTMCNC: 32.2 GM/DL
MCV RBC AUTO: 98.8 FL
MONOCYTES # BLD AUTO: 0.69 K/UL
MONOCYTES NFR BLD AUTO: 8.4 %
NEUTROPHILS # BLD AUTO: 5.2 K/UL
NEUTROPHILS NFR BLD AUTO: 63.1 %
NONHDLC SERPL-MCNC: 102 MG/DL
PLATELET # BLD AUTO: 185 K/UL
POTASSIUM SERPL-SCNC: 4.4 MMOL/L
RBC # BLD: 3.33 M/UL
RBC # FLD: 13.5 %
SODIUM SERPL-SCNC: 141 MMOL/L
TRIGL SERPL-MCNC: 151 MG/DL
WBC # FLD AUTO: 8.25 K/UL

## 2022-06-23 NOTE — HISTORY OF PRESENT ILLNESS
[FreeTextEntry1] : 81M hx of CAD (s/p PCI 2000, NSTEMI 4/2019 s/p PCI x 2 to LAD and staged PCI to OM1 in 5/2019), ICM (EF 45-50%, normal RV), CKD stage IV (B/L Scr 3-4.2), DM2, HTN, who presents for follow up. Pt was recently hospitalized for hypervolemia in setting of ALDEN on CKD from 5/16-5/20.  TTE performed showed normal biventricular function.  Pt was diuresed on Lasix 80mg IV BID and discharged on Lasix PO 80mg BID.  Since then, he states that he feels with improvement in dyspnea, but daughter feels he is more weak.  LE edema has improved since last visit and hospitalization.  Planning on moving to [place] next week.   No other complaints. DEnies CP, palpitations, orthopnea, PND.

## 2022-06-23 NOTE — CARDIOLOGY SUMMARY
[de-identified] : 6/22/2022 NSR, low precordial voltage [de-identified] : 5/18/22, TTE 65-70%, mild inferior wall hypokinesis, normal RV function, normal mitral valve

## 2022-06-23 NOTE — ASSESSMENT
[FreeTextEntry1] : 81M hx of CAD (s/p PCI 2000, NSTEMI 4/2019 s/p PCI x 2 to LAD and staged PCI to OM1 in 5/2019), ICM w/ HFrecEF (EF 65-70% 5/2022), CKD stage V, DM2, HTN, who presents for follow up.\par \par #ischemic cardiomyopathy\par -recovered EF as above\par -no new cardiac symptoms\par -LE edema predominantly 2/2 CKD V, improving on increased diuretic regimen\par -c/w Coreg 12.5mg PO BID, imdur 30mg PO daily, atorvastatin 10mg PO daily, ASA 81mg daily, ACEi per renal\par -c/w Lasix 80mg BID\par -renal recs appreciated; likely a poor candidate dialysis\par \par #HTN\par -well controlled\par -c/w amlodipine 5mg, hydralazine 100mg TID, and meds above \par \par Vaibhav Rosado MD \par . \par

## 2022-06-23 NOTE — PHYSICAL EXAM

## 2023-06-19 ENCOUNTER — TRANSCRIPTION ENCOUNTER (OUTPATIENT)
Age: 83
End: 2023-06-19

## 2023-07-27 NOTE — PROGRESS NOTE ADULT - ASSESSMENT
Pt. with ALDEN on CKD    # ALDEN on CKD  Pt regularly follows Nephrologist, Dr. Natalia Sharma. Last seen in office 1/28 where Scr was 3.33. Cr baseline 3.0-3.5. Likely 2/2 diabetic nephropathyCr 3.5 on admission, 3.8 yesterday, improved today to 3.73; baseline 3.0-3.5. Pt is currently euvolemic on exam. TTE is unchanged from 2019. EKG showing NSR. Likely due to hemodynamic instability 2/2 Lasix given on 2/12.   - UA with protein, spot urine TP/CR with non-nephrotic range proteinuria of 1.1 g.  - may continue with lisinopril  - avoid volume depletion  - avoid potential nephrotoxins    Thompson Rivas  Nephrology Fellow  Cell: [phone] (from 8 am to 5 pm)  (After 5 pm or on weekends please page on-call fellow) 3

## 2023-09-21 NOTE — PATIENT PROFILE ADULT - ANY SIGNIFICANT CHANGE IN ABILITY TO PERFORM THE FOLLOWING ADL SINCE THE ONSET OF PRESENT ILLNESS?
Closed  9/14/2023  4:10 PM  Sending user: Nathaly Aguilar   Payer:  [employer] Generic Payer     Notes     Time User Attachment    Filled on 09/02/2023, next fill on or after 09/24/2023.               no

## 2024-01-19 NOTE — ED ADULT NURSE NOTE - NS TRANSFER PATIENT BELONGINGS
Writer called and left a VM for Dr Perez's nurse in the GI department to let them know the referral they put in for patient ended up in [place] wound care and should have been placed for IV infusion in [place].     Writer did cancel the referral in the wound care workqueue.     GI department may or may not call back.        Clothing

## 2024-03-01 NOTE — ED ADULT NURSE NOTE - FACIAL SYMMETRY
[facility]    Acute pain service Inpatient Progress Note    Patient Name: Renata Lynn  :  1951  MRN:  [id number]        Acute Pain  Service Inpatient Progress Note:    Analgesia:Good  Pain Score:2/10  LOC: alert and awake  Side Effects:None  Catheter Site:clean, dry and dressing intact  Cath type: peripheral nerve cath(InfuSystem)  Infusion rate: Fem/ Add: Basal: 1ml/hr, PIB: 8ml q 8h, PCA: 8ml q 30 min  Catheter Plan:Catheter to remain Insitu and Continue catheter infusion rate unchanged                                             symmetrical

## 2024-05-28 NOTE — H&P ADULT - ATTENDING COMMENTS
Unsuccessful return call to patient.  LVM instructing patient to call to schedule bone scan.  Number provided.  Informed will forward the other 2 questions to her team.   Pt was seen and examined during key portion of E/M service. Case discussed with resident. H&P reviewed and edited where appropriate. Other than the following, I agree with the above history, exam, assessment, and plan.  81M PMH CAD (s/p MACI x 2 last in 2019), HFpEF (50% in 2020), DM2, HTN, HLD, ?CKD3 based on old labs, hypothyroidism presents with 2 weeks of chest heaviness, SOB, and fatigue likely 2/2 worsening CKD/ALDEN vs CHF exacerbation.  Tele, trend Ce, TTE. cont asa. diuresis lasix 40 IV BID. I&O, weights. trend Cr, avoid nephrotoxins. f/u renal recs. Pt should prepare for renal replacement therapy, and will need to continue further discussion. ISS.

## 2024-09-05 NOTE — DISCHARGE NOTE NURSING/CASE MANAGEMENT/SOCIAL WORK - NSDCPEPTCAREGIVEDUMATLIST _GEN_ALL_CORE
Heart Failure/Diabetes
Mildly to Moderately Impaired: difficulty hearing in some environments or speaker may need to increase volume or speak distinctly

## 2025-04-03 NOTE — DISCHARGE NOTE NURSING/CASE MANAGEMENT/SOCIAL WORK - NSDCPEFALRISK_GEN_ALL_CORE
For information on Fall & Injury Prevention, visit: [email or web address] OR  [email or web address] OR  https://www.cdc.gov/steadi/patient.html
3 (mild pain)

## 2025-06-10 NOTE — ED ADULT NURSE NOTE - NEURO MENTATION
Discontinue Regimen: triamcinolone acetonide 0.1 % topical cream BID x 2 weeks then EOD X 2 weeks /month as needed (ineffective per pt)\\n\\nPimecrolimus BID (ineffective per pt) Render In Strict Bullet Format?: No Detail Level: Zone normal